# Patient Record
Sex: FEMALE | Race: WHITE | Employment: PART TIME | URBAN - NONMETROPOLITAN AREA
[De-identification: names, ages, dates, MRNs, and addresses within clinical notes are randomized per-mention and may not be internally consistent; named-entity substitution may affect disease eponyms.]

---

## 2017-01-12 ENCOUNTER — OFFICE VISIT (OUTPATIENT)
Dept: PRIMARY CARE CLINIC | Age: 57
End: 2017-01-12

## 2017-01-12 VITALS
SYSTOLIC BLOOD PRESSURE: 108 MMHG | HEART RATE: 66 BPM | HEIGHT: 62 IN | WEIGHT: 141.6 LBS | TEMPERATURE: 96.9 F | BODY MASS INDEX: 26.06 KG/M2 | OXYGEN SATURATION: 95 % | DIASTOLIC BLOOD PRESSURE: 64 MMHG

## 2017-01-12 DIAGNOSIS — H61.22 IMPACTED CERUMEN OF LEFT EAR: Primary | ICD-10-CM

## 2017-01-12 DIAGNOSIS — J06.9 UPPER RESPIRATORY TRACT INFECTION, UNSPECIFIED TYPE: ICD-10-CM

## 2017-01-12 PROCEDURE — G8599 NO ASA/ANTIPLAT THER USE RNG: HCPCS | Performed by: NURSE PRACTITIONER

## 2017-01-12 PROCEDURE — 99213 OFFICE O/P EST LOW 20 MIN: CPT | Performed by: NURSE PRACTITIONER

## 2017-01-12 PROCEDURE — G8419 CALC BMI OUT NRM PARAM NOF/U: HCPCS | Performed by: NURSE PRACTITIONER

## 2017-01-12 PROCEDURE — 3014F SCREEN MAMMO DOC REV: CPT | Performed by: NURSE PRACTITIONER

## 2017-01-12 PROCEDURE — G8427 DOCREV CUR MEDS BY ELIG CLIN: HCPCS | Performed by: NURSE PRACTITIONER

## 2017-01-12 PROCEDURE — 3017F COLORECTAL CA SCREEN DOC REV: CPT | Performed by: NURSE PRACTITIONER

## 2017-01-12 PROCEDURE — 4004F PT TOBACCO SCREEN RCVD TLK: CPT | Performed by: NURSE PRACTITIONER

## 2017-01-12 PROCEDURE — G8484 FLU IMMUNIZE NO ADMIN: HCPCS | Performed by: NURSE PRACTITIONER

## 2017-01-12 RX ORDER — DEXTROMETHORPHAN HYDROBROMIDE AND PROMETHAZINE HYDROCHLORIDE 15; 6.25 MG/5ML; MG/5ML
5 SYRUP ORAL 4 TIMES DAILY PRN
Qty: 200 ML | Refills: 0 | Status: SHIPPED | OUTPATIENT
Start: 2017-01-12 | End: 2017-11-08 | Stop reason: ALTCHOICE

## 2017-01-12 RX ORDER — BENZONATATE 200 MG/1
200 CAPSULE ORAL 3 TIMES DAILY PRN
Qty: 30 CAPSULE | Refills: 0 | Status: SHIPPED | OUTPATIENT
Start: 2017-01-12 | End: 2017-11-08 | Stop reason: ALTCHOICE

## 2017-01-12 ASSESSMENT — ENCOUNTER SYMPTOMS
WHEEZING: 1
HEARTBURN: 0
COUGH: 1
HEMOPTYSIS: 0
RHINORRHEA: 1
SORE THROAT: 0
SHORTNESS OF BREATH: 1

## 2017-01-18 ENCOUNTER — OFFICE VISIT (OUTPATIENT)
Dept: FAMILY MEDICINE CLINIC | Age: 57
End: 2017-01-18

## 2017-01-18 VITALS
TEMPERATURE: 98.5 F | DIASTOLIC BLOOD PRESSURE: 78 MMHG | HEIGHT: 65 IN | WEIGHT: 139 LBS | BODY MASS INDEX: 23.16 KG/M2 | HEART RATE: 68 BPM | OXYGEN SATURATION: 99 % | SYSTOLIC BLOOD PRESSURE: 122 MMHG

## 2017-01-18 DIAGNOSIS — I10 ESSENTIAL HYPERTENSION: Primary | ICD-10-CM

## 2017-01-18 DIAGNOSIS — J40 BRONCHITIS: ICD-10-CM

## 2017-01-18 DIAGNOSIS — Z23 NEED FOR PNEUMOCOCCAL VACCINATION: ICD-10-CM

## 2017-01-18 DIAGNOSIS — G47.00 INSOMNIA, UNSPECIFIED TYPE: ICD-10-CM

## 2017-01-18 DIAGNOSIS — M19.90 ARTHRITIS: ICD-10-CM

## 2017-01-18 DIAGNOSIS — E78.00 HYPERCHOLESTEROLEMIA: ICD-10-CM

## 2017-01-18 PROCEDURE — G8599 NO ASA/ANTIPLAT THER USE RNG: HCPCS | Performed by: PHYSICIAN ASSISTANT

## 2017-01-18 PROCEDURE — 4004F PT TOBACCO SCREEN RCVD TLK: CPT | Performed by: PHYSICIAN ASSISTANT

## 2017-01-18 PROCEDURE — 3017F COLORECTAL CA SCREEN DOC REV: CPT | Performed by: PHYSICIAN ASSISTANT

## 2017-01-18 PROCEDURE — G8484 FLU IMMUNIZE NO ADMIN: HCPCS | Performed by: PHYSICIAN ASSISTANT

## 2017-01-18 PROCEDURE — 99214 OFFICE O/P EST MOD 30 MIN: CPT | Performed by: PHYSICIAN ASSISTANT

## 2017-01-18 PROCEDURE — 3014F SCREEN MAMMO DOC REV: CPT | Performed by: PHYSICIAN ASSISTANT

## 2017-01-18 PROCEDURE — G8420 CALC BMI NORM PARAMETERS: HCPCS | Performed by: PHYSICIAN ASSISTANT

## 2017-01-18 PROCEDURE — G8427 DOCREV CUR MEDS BY ELIG CLIN: HCPCS | Performed by: PHYSICIAN ASSISTANT

## 2017-01-18 RX ORDER — ALBUTEROL SULFATE 90 UG/1
2 AEROSOL, METERED RESPIRATORY (INHALATION) EVERY 6 HOURS PRN
COMMUNITY
End: 2018-10-16 | Stop reason: SDUPTHER

## 2017-01-18 RX ORDER — MELOXICAM 15 MG/1
15 TABLET ORAL DAILY
Qty: 30 TABLET | Refills: 6 | Status: SHIPPED | OUTPATIENT
Start: 2017-01-18 | End: 2017-08-08 | Stop reason: SDUPTHER

## 2017-01-18 RX ORDER — PREDNISONE 20 MG/1
20 TABLET ORAL 2 TIMES DAILY
Qty: 10 TABLET | Refills: 0 | Status: SHIPPED | OUTPATIENT
Start: 2017-01-18 | End: 2017-01-23

## 2017-01-18 RX ORDER — TRAZODONE HYDROCHLORIDE 50 MG/1
50 TABLET ORAL NIGHTLY
Qty: 30 TABLET | Refills: 6 | Status: SHIPPED | OUTPATIENT
Start: 2017-01-18 | End: 2017-08-08 | Stop reason: SDUPTHER

## 2017-01-18 RX ORDER — AZITHROMYCIN 250 MG/1
TABLET, FILM COATED ORAL
Qty: 1 PACKET | Refills: 0 | Status: SHIPPED | OUTPATIENT
Start: 2017-01-18 | End: 2017-01-28

## 2017-01-18 RX ORDER — LISINOPRIL 10 MG/1
10 TABLET ORAL DAILY
Qty: 30 TABLET | Refills: 6 | Status: SHIPPED | OUTPATIENT
Start: 2017-01-18 | End: 2017-08-08 | Stop reason: SDUPTHER

## 2017-01-18 RX ORDER — ATORVASTATIN CALCIUM 20 MG/1
20 TABLET, FILM COATED ORAL DAILY
Qty: 30 TABLET | Refills: 6 | Status: SHIPPED | OUTPATIENT
Start: 2017-01-18 | End: 2017-08-04 | Stop reason: SDUPTHER

## 2017-01-18 RX ORDER — GUAIFENESIN AND CODEINE PHOSPHATE 100; 10 MG/5ML; MG/5ML
5 SOLUTION ORAL 4 TIMES DAILY PRN
Qty: 150 ML | Refills: 1 | Status: SHIPPED | OUTPATIENT
Start: 2017-01-18 | End: 2017-01-25

## 2017-01-18 ASSESSMENT — ENCOUNTER SYMPTOMS
BLURRED VISION: 0
COUGH: 1
SHORTNESS OF BREATH: 1
WHEEZING: 1

## 2017-01-18 ASSESSMENT — PATIENT HEALTH QUESTIONNAIRE - PHQ9
SUM OF ALL RESPONSES TO PHQ9 QUESTIONS 1 & 2: 0
2. FEELING DOWN, DEPRESSED OR HOPELESS: 0
SUM OF ALL RESPONSES TO PHQ QUESTIONS 1-9: 0
1. LITTLE INTEREST OR PLEASURE IN DOING THINGS: 0

## 2017-07-21 ENCOUNTER — TELEPHONE (OUTPATIENT)
Dept: FAMILY MEDICINE CLINIC | Age: 57
End: 2017-07-21

## 2017-07-21 DIAGNOSIS — Z72.0 TOBACCO ABUSE: ICD-10-CM

## 2017-07-21 DIAGNOSIS — I25.119 CORONARY ARTERY DISEASE WITH ANGINA PECTORIS, UNSPECIFIED VESSEL OR LESION TYPE, UNSPECIFIED WHETHER NATIVE OR TRANSPLANTED HEART (HCC): Primary | ICD-10-CM

## 2017-07-21 DIAGNOSIS — Z86.39 HISTORY OF THYROIDITIS: ICD-10-CM

## 2017-07-21 DIAGNOSIS — E78.00 HYPERCHOLESTEROLEMIA: ICD-10-CM

## 2017-08-04 ENCOUNTER — TELEPHONE (OUTPATIENT)
Dept: FAMILY MEDICINE CLINIC | Age: 57
End: 2017-08-04

## 2017-08-04 DIAGNOSIS — E78.00 HYPERCHOLESTEROLEMIA: ICD-10-CM

## 2017-08-04 RX ORDER — ATORVASTATIN CALCIUM 20 MG/1
20 TABLET, FILM COATED ORAL DAILY
Qty: 30 TABLET | Refills: 1 | Status: SHIPPED | OUTPATIENT
Start: 2017-08-04 | End: 2018-02-07 | Stop reason: SDUPTHER

## 2017-08-08 ENCOUNTER — OFFICE VISIT (OUTPATIENT)
Dept: FAMILY MEDICINE CLINIC | Age: 57
End: 2017-08-08
Payer: COMMERCIAL

## 2017-08-08 VITALS
DIASTOLIC BLOOD PRESSURE: 80 MMHG | HEIGHT: 65 IN | TEMPERATURE: 98 F | BODY MASS INDEX: 23.22 KG/M2 | SYSTOLIC BLOOD PRESSURE: 120 MMHG | HEART RATE: 80 BPM | WEIGHT: 139.4 LBS

## 2017-08-08 DIAGNOSIS — I10 ESSENTIAL HYPERTENSION: ICD-10-CM

## 2017-08-08 DIAGNOSIS — M54.5 LOW BACK PAIN, UNSPECIFIED BACK PAIN LATERALITY, UNSPECIFIED CHRONICITY, WITH SCIATICA PRESENCE UNSPECIFIED: ICD-10-CM

## 2017-08-08 DIAGNOSIS — H61.22 IMPACTED CERUMEN, LEFT EAR: ICD-10-CM

## 2017-08-08 DIAGNOSIS — N30.00 ACUTE CYSTITIS WITHOUT HEMATURIA: ICD-10-CM

## 2017-08-08 DIAGNOSIS — E78.00 HYPERCHOLESTEROLEMIA: ICD-10-CM

## 2017-08-08 DIAGNOSIS — Z72.0 TOBACCO ABUSE: ICD-10-CM

## 2017-08-08 DIAGNOSIS — G47.00 INSOMNIA, UNSPECIFIED TYPE: ICD-10-CM

## 2017-08-08 DIAGNOSIS — S99.921A TOE INJURY, RIGHT, INITIAL ENCOUNTER: ICD-10-CM

## 2017-08-08 DIAGNOSIS — R10.30 LOWER ABDOMINAL PAIN: ICD-10-CM

## 2017-08-08 DIAGNOSIS — M19.90 ARTHRITIS: Primary | ICD-10-CM

## 2017-08-08 LAB
-: ABNORMAL
AMORPHOUS: ABNORMAL
BACTERIA: ABNORMAL
BILIRUBIN URINE: NEGATIVE
CASTS UA: ABNORMAL /LPF (ref 0–2)
COLOR: ABNORMAL
COMMENT UA: ABNORMAL
CRYSTALS, UA: ABNORMAL /HPF
EPITHELIAL CELLS UA: ABNORMAL /HPF (ref 0–5)
GLUCOSE URINE: NEGATIVE
KETONES, URINE: NEGATIVE
LEUKOCYTE ESTERASE, URINE: NEGATIVE
MUCUS: ABNORMAL
NITRITE, URINE: POSITIVE
OTHER OBSERVATIONS UA: ABNORMAL
PH UA: 5.5 (ref 5–6)
PROTEIN UA: NEGATIVE
RBC UA: ABNORMAL /HPF (ref 0–4)
RENAL EPITHELIAL, UA: ABNORMAL /HPF
SPECIFIC GRAVITY UA: 1.03 (ref 1.01–1.02)
TRICHOMONAS: ABNORMAL
TURBIDITY: ABNORMAL
URINE HGB: NEGATIVE
UROBILINOGEN, URINE: NORMAL
WBC UA: ABNORMAL /HPF (ref 0–4)
YEAST: ABNORMAL

## 2017-08-08 PROCEDURE — 87086 URINE CULTURE/COLONY COUNT: CPT | Performed by: PHYSICIAN ASSISTANT

## 2017-08-08 PROCEDURE — 69209 REMOVE IMPACTED EAR WAX UNI: CPT | Performed by: PHYSICIAN ASSISTANT

## 2017-08-08 PROCEDURE — 99214 OFFICE O/P EST MOD 30 MIN: CPT | Performed by: PHYSICIAN ASSISTANT

## 2017-08-08 PROCEDURE — 87088 URINE BACTERIA CULTURE: CPT | Performed by: PHYSICIAN ASSISTANT

## 2017-08-08 PROCEDURE — 81001 URINALYSIS AUTO W/SCOPE: CPT | Performed by: PHYSICIAN ASSISTANT

## 2017-08-08 PROCEDURE — 81003 URINALYSIS AUTO W/O SCOPE: CPT | Performed by: PHYSICIAN ASSISTANT

## 2017-08-08 PROCEDURE — 87186 SC STD MICRODIL/AGAR DIL: CPT | Performed by: PHYSICIAN ASSISTANT

## 2017-08-08 RX ORDER — NITROFURANTOIN 25; 75 MG/1; MG/1
100 CAPSULE ORAL 2 TIMES DAILY
Qty: 14 CAPSULE | Refills: 0 | Status: SHIPPED | OUTPATIENT
Start: 2017-08-08 | End: 2017-08-15

## 2017-08-08 RX ORDER — LISINOPRIL 10 MG/1
10 TABLET ORAL DAILY
Qty: 30 TABLET | Refills: 6 | Status: SHIPPED | OUTPATIENT
Start: 2017-08-08 | End: 2018-02-12 | Stop reason: SDUPTHER

## 2017-08-08 RX ORDER — ACETAMINOPHEN AND CODEINE PHOSPHATE 300; 30 MG/1; MG/1
1 TABLET ORAL EVERY 6 HOURS PRN
Qty: 15 TABLET | Refills: 0 | Status: SHIPPED | OUTPATIENT
Start: 2017-08-08 | End: 2018-02-12 | Stop reason: SDUPTHER

## 2017-08-08 RX ORDER — MELOXICAM 15 MG/1
15 TABLET ORAL DAILY
Qty: 30 TABLET | Refills: 6 | Status: SHIPPED | OUTPATIENT
Start: 2017-08-08 | End: 2018-02-12 | Stop reason: SDUPTHER

## 2017-08-08 RX ORDER — CYCLOBENZAPRINE HCL 10 MG
10 TABLET ORAL 3 TIMES DAILY PRN
Qty: 45 TABLET | Refills: 0 | Status: SHIPPED | OUTPATIENT
Start: 2017-08-08 | End: 2018-02-12 | Stop reason: SDUPTHER

## 2017-08-08 RX ORDER — TRAZODONE HYDROCHLORIDE 50 MG/1
50 TABLET ORAL NIGHTLY
Qty: 30 TABLET | Refills: 6 | Status: SHIPPED | OUTPATIENT
Start: 2017-08-08 | End: 2018-02-12 | Stop reason: SDUPTHER

## 2017-08-11 ENCOUNTER — TELEPHONE (OUTPATIENT)
Dept: FAMILY MEDICINE CLINIC | Age: 57
End: 2017-08-11

## 2017-08-11 LAB
CULTURE: ABNORMAL
CULTURE: ABNORMAL
Lab: ABNORMAL
ORGANISM: ABNORMAL
SPECIMEN DESCRIPTION: ABNORMAL
SPECIMEN DESCRIPTION: ABNORMAL
STATUS: ABNORMAL

## 2017-11-08 ENCOUNTER — OFFICE VISIT (OUTPATIENT)
Dept: FAMILY MEDICINE CLINIC | Age: 57
End: 2017-11-08
Payer: COMMERCIAL

## 2017-11-08 ENCOUNTER — HOSPITAL ENCOUNTER (OUTPATIENT)
Age: 57
Setting detail: SPECIMEN
Discharge: HOME | End: 2017-11-08
Payer: COMMERCIAL

## 2017-11-08 VITALS
TEMPERATURE: 98.3 F | SYSTOLIC BLOOD PRESSURE: 138 MMHG | DIASTOLIC BLOOD PRESSURE: 90 MMHG | HEART RATE: 65 BPM | OXYGEN SATURATION: 99 % | BODY MASS INDEX: 24.03 KG/M2 | RESPIRATION RATE: 12 BRPM | WEIGHT: 144.2 LBS | HEIGHT: 65 IN

## 2017-11-08 DIAGNOSIS — R30.0 DYSURIA: ICD-10-CM

## 2017-11-08 DIAGNOSIS — M25.562 LEFT MEDIAL KNEE PAIN: Primary | ICD-10-CM

## 2017-11-08 DIAGNOSIS — N30.00 ACUTE CYSTITIS WITHOUT HEMATURIA: ICD-10-CM

## 2017-11-08 LAB
-: ABNORMAL
AMORPHOUS: ABNORMAL
BACTERIA: ABNORMAL
BILIRUBIN URINE: NEGATIVE
CASTS UA: ABNORMAL /LPF (ref 0–2)
COLOR: NORMAL
COMMENT UA: NORMAL
CRYSTALS, UA: ABNORMAL /HPF
EPITHELIAL CELLS UA: ABNORMAL /HPF (ref 0–5)
GLUCOSE URINE: NEGATIVE
KETONES, URINE: NEGATIVE
LEUKOCYTE ESTERASE, URINE: NEGATIVE
MUCUS: ABNORMAL
NITRITE, URINE: NEGATIVE
OTHER OBSERVATIONS UA: ABNORMAL
PH UA: 5.5 (ref 5–6)
PROTEIN UA: NEGATIVE
RBC UA: ABNORMAL /HPF (ref 0–4)
RENAL EPITHELIAL, UA: ABNORMAL /HPF
SPECIFIC GRAVITY UA: 1.01 (ref 1.01–1.02)
TRICHOMONAS: ABNORMAL
TURBIDITY: NORMAL
URINE HGB: NEGATIVE
UROBILINOGEN, URINE: NORMAL
WBC UA: ABNORMAL /HPF (ref 0–4)
YEAST: ABNORMAL

## 2017-11-08 PROCEDURE — 3017F COLORECTAL CA SCREEN DOC REV: CPT | Performed by: NURSE PRACTITIONER

## 2017-11-08 PROCEDURE — 87086 URINE CULTURE/COLONY COUNT: CPT

## 2017-11-08 PROCEDURE — 99214 OFFICE O/P EST MOD 30 MIN: CPT | Performed by: NURSE PRACTITIONER

## 2017-11-08 PROCEDURE — 87186 SC STD MICRODIL/AGAR DIL: CPT

## 2017-11-08 PROCEDURE — G8427 DOCREV CUR MEDS BY ELIG CLIN: HCPCS | Performed by: NURSE PRACTITIONER

## 2017-11-08 PROCEDURE — G8599 NO ASA/ANTIPLAT THER USE RNG: HCPCS | Performed by: NURSE PRACTITIONER

## 2017-11-08 PROCEDURE — G8484 FLU IMMUNIZE NO ADMIN: HCPCS | Performed by: NURSE PRACTITIONER

## 2017-11-08 PROCEDURE — G8420 CALC BMI NORM PARAMETERS: HCPCS | Performed by: NURSE PRACTITIONER

## 2017-11-08 PROCEDURE — 4004F PT TOBACCO SCREEN RCVD TLK: CPT | Performed by: NURSE PRACTITIONER

## 2017-11-08 PROCEDURE — 81001 URINALYSIS AUTO W/SCOPE: CPT

## 2017-11-08 PROCEDURE — 87077 CULTURE AEROBIC IDENTIFY: CPT

## 2017-11-08 PROCEDURE — 3014F SCREEN MAMMO DOC REV: CPT | Performed by: NURSE PRACTITIONER

## 2017-11-08 RX ORDER — PHENAZOPYRIDINE HYDROCHLORIDE 100 MG/1
100 TABLET, FILM COATED ORAL 3 TIMES DAILY PRN
Qty: 21 TABLET | Refills: 0 | Status: SHIPPED | OUTPATIENT
Start: 2017-11-08 | End: 2017-11-15

## 2017-11-08 RX ORDER — PREDNISONE 50 MG/1
50 TABLET ORAL DAILY
Qty: 5 TABLET | Refills: 0 | Status: SHIPPED | OUTPATIENT
Start: 2017-11-08 | End: 2017-11-13

## 2017-11-08 RX ORDER — CIPROFLOXACIN 500 MG/1
500 TABLET, FILM COATED ORAL 2 TIMES DAILY
Qty: 20 TABLET | Refills: 0 | Status: SHIPPED | OUTPATIENT
Start: 2017-11-08 | End: 2017-11-10 | Stop reason: ALTCHOICE

## 2017-11-08 ASSESSMENT — ENCOUNTER SYMPTOMS
NAUSEA: 0
VOMITING: 0
SINUS PRESSURE: 0
ABDOMINAL PAIN: 0
CHEST TIGHTNESS: 0
EYES NEGATIVE: 1
DIARRHEA: 0
SHORTNESS OF BREATH: 0
TROUBLE SWALLOWING: 0
ALLERGIC/IMMUNOLOGIC NEGATIVE: 1
CONSTIPATION: 0
COUGH: 0

## 2017-11-08 NOTE — PROGRESS NOTES
[facility]    Subjective:      Patient ID: Edilia Dee is a 55 y.o. y.o. female. HPI Patient in for office for multiple concerns. States that she has had increased frequency and flank pain for about a week. Will obtain a urine at this visit. States that she has been having some left knee pain about a week ago. Denies injury. States that it started off with a slight pain and it is getting worse and making it difficult to ambulate. She has been taking IBU and tylenol without relief. States that she has been trying to wear the knee brace and she feels like that makes that pain worse. She at times that it will give out. She states that she still takes the Mobic daily. She is wondering if Tylenol #3 would help her pain. I told her we would assess her knee and decide then. She states that she recently lost her insurance and just got it back. Past Medical History:   Diagnosis Date    COPD (chronic obstructive pulmonary disease) ([facility] [address]     Hemorrhoids     History of herpes zoster 2002    (right lower rib area laterally and anteriorly).  History of thyroiditis 1992    History of ulcerative colitis     (of 20 years disease - though quiescent).  Hypercholesterolemia     Hypertension     Neuritis     Shortened [place] interval     (on EKG from 2004).  Tobacco abuse     Venous insufficiency, peripheral     (bilateral). Past Surgical History:   Procedure Laterality Date    APPENDECTOMY  1982    (Dr. Ryan Pitts) - Acute appendicitis.  CARDIAC CATHETERIZATION  2000    Preserved left ventricular systolic function. Ejection fraction 50%. Noncritical coronary disease. (Dr. Suzan Zelaya).  CHOLECYSTECTOMY  2001    COLONOSCOPY  1988    (Dr. Lakia Whalen) - Normal colon.  COLONOSCOPY  5/12/16    Polyp distal sigmoid, rectum - Dr Breonna Cordova  2010    Incision and drainage of thrombosed hemorrhoid.  HYSTERECTOMY  2001    (no BSO).     OTHER SURGICAL HISTORY      Ear surgery x8.    OTHER SURGICAL HISTORY  1987    Pelvic exam under anesthesia. (Dr. Flora Hermosillo) - Chronic pelvic pain, chronic inflammatory process changes.  TUBAL LIGATION  1982       Family History   Problem Relation Age of Onset    Diabetes Mother     Other Mother      (history of polyps).  Arthritis Mother     Cancer Mother      breast    COPD Father     Other Father      ('s disease)    Mental Illness Daughter      bipolar; schizophrenia       No Known Allergies    Current Outpatient Prescriptions   Medication Sig Dispense Refill    predniSONE (DELTASONE) 50 MG tablet Take 1 tablet by mouth daily for 5 days 5 tablet 0    ciprofloxacin (CIPRO) 500 MG tablet Take 1 tablet by mouth 2 times daily 20 tablet 0    phenazopyridine (PYRIDIUM) 100 MG tablet Take 1 tablet by mouth 3 times daily as needed for Pain 21 tablet 0    meloxicam (MOBIC) 15 MG tablet Take 1 tablet by mouth daily 30 tablet 6    lisinopril (PRINIVIL;ZESTRIL) 10 MG tablet Take 1 tablet by mouth daily 30 tablet 6    atorvastatin (LIPITOR) 20 MG tablet Take 1 tablet by mouth daily 30 tablet 1    albuterol sulfate  (90 BASE) MCG/ACT inhaler Inhale 2 puffs into the lungs every 6 hours as needed for Wheezing      Glucosamine-Chondroit-Vit C-Mn (GLUCOSAMINE 1500 COMPLEX PO) Take by mouth daily      CRANBERRY PO Take by mouth daily      Multiple Vitamins-Minerals (HAIR/SKIN/NAILS PO) Take by mouth daily      ascorbic acid (VITAMIN C) 500 MG tablet Take 500 mg by mouth daily.       traZODone (DESYREL) 50 MG tablet Take 1 tablet by mouth nightly 30 tablet 6    cyclobenzaprine (FLEXERIL) 10 MG tablet Take 1 tablet by mouth 3 times daily as needed for Muscle spasms 45 tablet 0    acetaminophen-codeine (TYLENOL #3) 300-30 MG per tablet Take 1 tablet by mouth every 6 hours as needed for Pain 15 tablet 0     Current Facility-Administered Medications   Medication Dose Route Frequency Provider Last Rate Last Dose    albuterol Musculoskeletal: Normal range of motion. Left knee: She exhibits swelling (mild at point of tenderness). She exhibits normal range of motion, no effusion, no deformity, no laceration and no erythema. Tenderness found. Medial joint line tenderness noted. Legs:  Neurological: She is alert and oriented to person, place, and time. Skin: Skin is warm and dry. Psychiatric: She has a normal mood and affect. Her behavior is normal. Judgment and thought content normal.     Hospital Outpatient Visit on 11/08/2017   Component Date Value Ref Range Status    Color, UA 11/08/2017 NOT REPORTED  YEL Final    Turbidity UA 11/08/2017 NOT REPORTED  CLEAR Final    Glucose, Ur 11/08/2017 NEGATIVE  NEG Final    Bilirubin Urine 11/08/2017 NEGATIVE  NEG Final    Ketones, Urine 11/08/2017 NEGATIVE  NEG Final    Specific Gravity, UA 11/08/2017 1.010  1.010 - 1.025 Final    Urine Hgb 11/08/2017 NEGATIVE  NEG Final    pH, UA 11/08/2017 5.5  5.0 - 6.0 Final    Protein, UA 11/08/2017 NEGATIVE  NEG Final    Urobilinogen, Urine 11/08/2017 Normal  NORM Final    Nitrite, Urine 11/08/2017 NEGATIVE  NEG Final    Leukocyte Esterase, Urine 11/08/2017 NEGATIVE  NEG Final    Comment: Performed at [facility] [address] [id number] [phone]      Urinalysis Comments 11/08/2017 NOT REPORTED   Final    - 11/08/2017        Final    WBC, UA 11/08/2017 0 TO 4  0 - 4 /HPF Final    RBC, UA 11/08/2017 0 TO 4  0 - 4 /HPF Final    Casts UA 11/08/2017 NOT REPORTED  0 - 2 /LPF Final    Crystals UA 11/08/2017 NOT REPORTED  NONE /HPF Final    Epithelial Cells UA 11/08/2017 0 TO 4  0 - 5 /HPF Final    Renal Epithelial, Urine 11/08/2017 NOT REPORTED  0 /HPF Final    Bacteria, UA 11/08/2017 3+* NONE Final    Comment: Performed at [facility] [address] [address] [phone]      Mucus, UA 11/08/2017 NOT REPORTED

## 2017-11-08 NOTE — PATIENT INSTRUCTIONS
Patient Education        Learning About Benefits From Quitting Smoking  How does quitting smoking make you healthier? If you're thinking about quitting smoking, you may have a few reasons to be smoke-free. Your health may be one of them. · When you quit smoking, you lower your risks for cancer, lung disease, heart attack, stroke, blood vessel disease, and blindness from macular degeneration. · When you're smoke-free, you get sick less often, and you heal faster. You are less likely to get colds, flu, bronchitis, and pneumonia. · As a nonsmoker, you may find that your mood is better and you are less stressed. When and how will you feel healthier? Quitting has real health benefits that start from day 1 of being smoke-free. And the longer you stay smoke-free, the healthier you get and the better you feel. The first hours  · After just 20 minutes, your blood pressure and heart rate go down. That means there's less stress on your heart and blood vessels. · Within 12 hours, the level of carbon monoxide in your blood drops back to normal. That makes room for more oxygen. With more oxygen in your body, you may notice that you have more energy than when you smoked. After 2 weeks  · Your lungs start to work better. · Your risk of heart attack starts to drop. After 1 month  · When your lungs are clear, you cough less and breathe deeper, so it's easier to be active. · Your sense of taste and smell return. That means you can enjoy food more than you have since you started smoking. Over the years  · After 1 year, your risk of heart disease is half what it would be if you kept smoking. · After 5 years, your risk of stroke starts to shrink. Within a few years after that, it's about the same as if you'd never smoked. · After 10 years, your risk of dying from lung cancer is cut by about half. And your risk for many other types of cancer is lower too. How would quitting help others in your life?   When you quit smoking, you improve the health of everyone who now breathes in your smoke. · Their heart, lung, and cancer risks drop, much like yours. · They are sick less. For babies and small children, living smoke-free means they're less likely to have ear infections, pneumonia, and bronchitis. · If you're a woman who is or will be pregnant someday, quitting smoking means a healthier . · Children who are close to you are less likely to become adult smokers. Where can you learn more? Go to https://[employer]. org and sign in to your [employer] account. Enter [phone] 72 11 in the Epyon box to learn more about \"Learning About Benefits From Quitting Smoking. \"     If you do not have an account, please click on the \"Sign Up Now\" link. Current as of: 2017  Content Version: 11.3  © 6671-1901 [employer]. Care instructions adapted under license by [facility] ([facility]). If you have questions about a medical condition or this instruction, always ask your healthcare professional. [place] [address] any warranty or liability for your use of this information. Patient Education        Knee: Exercises  Your Care Instructions  Here are some examples of exercises for your knee. Start each exercise slowly. Ease off the exercise if you start to have pain. Your doctor or physical therapist will tell you when you can start these exercises and which ones will work best for you. How to do the exercises  Quad sets    1. Sit with your leg straight and supported on the floor or a firm bed. (If you feel discomfort in the front or back of your knee, place a small towel roll under your knee.)  2. Tighten the muscles on top of your thigh by pressing the back of your knee flat down to the floor. (If you feel discomfort under your kneecap, place a small towel roll under your knee.)  3. Hold for about 6 seconds, then rest for up to 10 seconds.   4. Do 8 to 12 repetitions several times a day.  Straight-leg raises to the front    1. Lie on your back with your good knee bent so that your foot rests flat on the floor. Your injured leg should be straight. Make sure that your low back has a normal curve. You should be able to slip your flat hand in between the floor and the small of your back, with your palm touching the floor and your back touching the back of your hand. 2. Tighten the thigh muscles in the injured leg by pressing the back of your knee flat down to the floor. Hold your knee straight. 3. Keeping the thigh muscles tight, lift your injured leg up so that your heel is about 12 inches off the floor. Hold for about 6 seconds and then lower slowly. 4. Do 8 to 12 repetitions, 3 times a day. Straight-leg raises to the outside    1. Lie on your side, with your injured leg on top. 2. Tighten the front thigh muscles of your injured leg to keep your knee straight. 3. Keep your hip and your leg straight in line with the rest of your body, and keep your knee pointing forward. Do not drop your hip back. 4. Lift your injured leg straight up toward the ceiling, about 12 inches off the floor. Hold for about 6 seconds, then slowly lower your leg. 5. Do 8 to 12 repetitions. Straight-leg raises to the back    1. Lie on your stomach, and lift your leg straight up behind you (toward the ceiling). 2. Lift your toes about 6 inches off the floor, hold for about 6 seconds, then lower slowly. 3. Do 8 to 12 repetitions. Straight-leg raises to the inside    1. Lie on the side of your body with the injured leg. 2. You can either prop your other (good) leg up on a chair, or you can bend your good knee and put that foot in front of your injured knee. Do not drop your hip back. 3. Tighten the muscles on the front of your thigh to straighten your injured knee. 4. Keep your kneecap pointing forward, and lift your whole leg up toward the ceiling about 6 inches.  Hold for about 6 seconds, then lower slowly. 5. Do 8 to 12 repetitions. Heel dig bridging    1. Lie on your back with both knees bent and your ankles bent so that only your heels are digging into the floor. Your knees should be bent about 90 degrees. 2. Then push your heels into the floor, squeeze your buttocks, and lift your hips off the floor until your shoulders, hips, and knees are all in a straight line. 3. Hold for about 6 seconds as you continue to breathe normally, and then slowly lower your hips back down to the floor and rest for up to 10 seconds. 4. Do 8 to 12 repetitions. Hamstring curls    1. Lie on your stomach with your knees straight. If your kneecap is uncomfortable, roll up a washcloth and put it under your leg just above your kneecap. 2. Lift the foot of your injured leg by bending the knee so that you bring the foot up toward your buttock. If this motion hurts, try it without bending your knee quite as far. This may help you avoid any painful motion. 3. Slowly lower your leg back to the floor. 4. Do 8 to 12 repetitions. 5. With permission from your doctor or physical therapist, you may also want to add a cuff weight to your ankle (not more than 5 pounds). With weight, you do not have to lift your leg more than 12 inches to get a hamstring workout. Shallow standing knee bends    Note: Do this exercise only if you have very little pain; if you have no clicking, locking, or giving way if you have an injured knee; and if it does not hurt while you are doing 8 to 12 repetitions. 1. Stand with your hands lightly resting on a counter or chair in front of you. Put your feet shoulder-width apart. 2. Slowly bend your knees so that you squat down like you are going to sit in a chair. Make sure your knees do not go in front of your toes. 3. Lower yourself about 6 inches. Your heels should remain on the floor at all times. 4. Rise slowly to a standing position. Heel raises    1.  Stand with your feet a few inches apart, with your

## 2017-11-10 DIAGNOSIS — N30.01 ACUTE CYSTITIS WITH HEMATURIA: Primary | ICD-10-CM

## 2017-11-10 RX ORDER — SULFAMETHOXAZOLE AND TRIMETHOPRIM 800; 160 MG/1; MG/1
1 TABLET ORAL 2 TIMES DAILY
Qty: 20 TABLET | Refills: 0 | Status: SHIPPED | OUTPATIENT
Start: 2017-11-10 | End: 2017-11-20

## 2018-02-06 ENCOUNTER — HOSPITAL ENCOUNTER (OUTPATIENT)
Dept: LAB | Age: 58
Setting detail: SPECIMEN
Discharge: HOME | End: 2018-02-06
Payer: COMMERCIAL

## 2018-02-06 DIAGNOSIS — I10 ESSENTIAL HYPERTENSION: ICD-10-CM

## 2018-02-06 DIAGNOSIS — Z72.0 TOBACCO ABUSE: ICD-10-CM

## 2018-02-06 DIAGNOSIS — E78.00 HYPERCHOLESTEROLEMIA: ICD-10-CM

## 2018-02-06 LAB
ALBUMIN SERPL-MCNC: 4.1 G/DL (ref 3.5–5.2)
ALBUMIN/GLOBULIN RATIO: 1.6 (ref 1–2.5)
ALP BLD-CCNC: 65 U/L (ref 35–104)
ALT SERPL-CCNC: 15 U/L (ref 5–33)
ANION GAP SERPL CALCULATED.3IONS-SCNC: 13 MMOL/L (ref 9–17)
AST SERPL-CCNC: 17 U/L
BILIRUB SERPL-MCNC: 0.31 MG/DL (ref 0.3–1.2)
BUN BLDV-MCNC: 22 MG/DL (ref 6–20)
BUN/CREAT BLD: 27 (ref 9–20)
CALCIUM SERPL-MCNC: 9.5 MG/DL (ref 8.6–10.4)
CHLORIDE BLD-SCNC: 103 MMOL/L (ref 98–107)
CHOLESTEROL/HDL RATIO: 4.6
CHOLESTEROL: 237 MG/DL
CO2: 25 MMOL/L (ref 20–31)
CREAT SERPL-MCNC: 0.81 MG/DL (ref 0.5–0.9)
GFR AFRICAN AMERICAN: >60 ML/MIN
GFR NON-AFRICAN AMERICAN: >60 ML/MIN
GFR SERPL CREATININE-BSD FRML MDRD: ABNORMAL ML/MIN/{1.73_M2}
GFR SERPL CREATININE-BSD FRML MDRD: ABNORMAL ML/MIN/{1.73_M2}
GLUCOSE BLD-MCNC: 102 MG/DL (ref 70–99)
HDLC SERPL-MCNC: 52 MG/DL
LDL CHOLESTEROL: 163 MG/DL (ref 0–130)
POTASSIUM SERPL-SCNC: 4.4 MMOL/L (ref 3.7–5.3)
SODIUM BLD-SCNC: 141 MMOL/L (ref 135–144)
TOTAL PROTEIN: 6.6 G/DL (ref 6.4–8.3)
TRIGL SERPL-MCNC: 109 MG/DL
VLDLC SERPL CALC-MCNC: ABNORMAL MG/DL (ref 1–30)

## 2018-02-06 PROCEDURE — 36415 COLL VENOUS BLD VENIPUNCTURE: CPT

## 2018-02-06 PROCEDURE — 80053 COMPREHEN METABOLIC PANEL: CPT

## 2018-02-06 PROCEDURE — 80061 LIPID PANEL: CPT

## 2018-02-07 ENCOUNTER — TELEPHONE (OUTPATIENT)
Dept: FAMILY MEDICINE CLINIC | Age: 58
End: 2018-02-07

## 2018-02-07 DIAGNOSIS — E78.00 HYPERCHOLESTEROLEMIA: ICD-10-CM

## 2018-02-07 RX ORDER — ATORVASTATIN CALCIUM 20 MG/1
20 TABLET, FILM COATED ORAL DAILY
Qty: 30 TABLET | Refills: 0 | Status: SHIPPED | OUTPATIENT
Start: 2018-02-07 | End: 2018-02-12 | Stop reason: SDUPTHER

## 2018-02-12 ENCOUNTER — HOSPITAL ENCOUNTER (OUTPATIENT)
Dept: LAB | Age: 58
Setting detail: SPECIMEN
Discharge: HOME | End: 2018-02-12

## 2018-02-12 ENCOUNTER — OFFICE VISIT (OUTPATIENT)
Dept: FAMILY MEDICINE CLINIC | Age: 58
End: 2018-02-12

## 2018-02-12 VITALS
BODY MASS INDEX: 24.19 KG/M2 | SYSTOLIC BLOOD PRESSURE: 120 MMHG | RESPIRATION RATE: 18 BRPM | WEIGHT: 145.2 LBS | TEMPERATURE: 97.4 F | OXYGEN SATURATION: 97 % | HEIGHT: 65 IN | DIASTOLIC BLOOD PRESSURE: 70 MMHG | HEART RATE: 82 BPM

## 2018-02-12 DIAGNOSIS — Z87.898: ICD-10-CM

## 2018-02-12 DIAGNOSIS — H60.501 ACUTE OTITIS EXTERNA OF RIGHT EAR, UNSPECIFIED TYPE: ICD-10-CM

## 2018-02-12 DIAGNOSIS — I10 ESSENTIAL HYPERTENSION: ICD-10-CM

## 2018-02-12 DIAGNOSIS — E78.00 HYPERCHOLESTEROLEMIA: Primary | ICD-10-CM

## 2018-02-12 DIAGNOSIS — M25.561 ACUTE PAIN OF RIGHT KNEE: ICD-10-CM

## 2018-02-12 DIAGNOSIS — M19.90 ARTHRITIS: ICD-10-CM

## 2018-02-12 DIAGNOSIS — G47.00 INSOMNIA, UNSPECIFIED TYPE: ICD-10-CM

## 2018-02-12 LAB
-: ABNORMAL
AMORPHOUS: ABNORMAL
BACTERIA: ABNORMAL
BILIRUBIN URINE: NEGATIVE
CASTS UA: ABNORMAL /LPF (ref 0–2)
COLOR: ABNORMAL
COMMENT UA: ABNORMAL
CRYSTALS, UA: ABNORMAL /HPF
EPITHELIAL CELLS UA: ABNORMAL /HPF (ref 0–5)
GLUCOSE URINE: NEGATIVE
KETONES, URINE: NEGATIVE
LEUKOCYTE ESTERASE, URINE: NEGATIVE
MUCUS: ABNORMAL
NITRITE, URINE: NEGATIVE
OTHER OBSERVATIONS UA: ABNORMAL
PH UA: 5.5 (ref 5–6)
PROTEIN UA: NEGATIVE
RBC UA: ABNORMAL /HPF (ref 0–4)
RENAL EPITHELIAL, UA: ABNORMAL /HPF
SPECIFIC GRAVITY UA: 1.02 (ref 1.01–1.02)
TRICHOMONAS: ABNORMAL
TURBIDITY: ABNORMAL
URINE HGB: NEGATIVE
UROBILINOGEN, URINE: NORMAL
WBC UA: ABNORMAL /HPF (ref 0–4)
YEAST: ABNORMAL

## 2018-02-12 PROCEDURE — 81001 URINALYSIS AUTO W/SCOPE: CPT

## 2018-02-12 PROCEDURE — 99214 OFFICE O/P EST MOD 30 MIN: CPT | Performed by: PHYSICIAN ASSISTANT

## 2018-02-12 RX ORDER — LISINOPRIL 10 MG/1
10 TABLET ORAL DAILY
Qty: 30 TABLET | Refills: 6 | Status: SHIPPED | OUTPATIENT
Start: 2018-02-12 | End: 2018-10-05 | Stop reason: SDUPTHER

## 2018-02-12 RX ORDER — TRAZODONE HYDROCHLORIDE 50 MG/1
50 TABLET ORAL NIGHTLY
Qty: 30 TABLET | Refills: 6 | Status: SHIPPED | OUTPATIENT
Start: 2018-02-12 | End: 2018-10-16 | Stop reason: SDUPTHER

## 2018-02-12 RX ORDER — CYCLOBENZAPRINE HCL 10 MG
10 TABLET ORAL 3 TIMES DAILY PRN
Qty: 45 TABLET | Refills: 0 | Status: SHIPPED | OUTPATIENT
Start: 2018-02-12 | End: 2018-10-16 | Stop reason: SDUPTHER

## 2018-02-12 RX ORDER — ACETAMINOPHEN AND CODEINE PHOSPHATE 300; 30 MG/1; MG/1
1 TABLET ORAL EVERY 6 HOURS PRN
Qty: 15 TABLET | Refills: 0 | Status: SHIPPED | OUTPATIENT
Start: 2018-02-12 | End: 2018-02-19

## 2018-02-12 RX ORDER — ATORVASTATIN CALCIUM 20 MG/1
20 TABLET, FILM COATED ORAL DAILY
Qty: 30 TABLET | Refills: 6 | Status: SHIPPED | OUTPATIENT
Start: 2018-02-12 | End: 2018-10-16 | Stop reason: SDUPTHER

## 2018-02-12 RX ORDER — MELOXICAM 15 MG/1
15 TABLET ORAL DAILY
Qty: 30 TABLET | Refills: 6 | Status: SHIPPED | OUTPATIENT
Start: 2018-02-12 | End: 2018-10-05 | Stop reason: SDUPTHER

## 2018-02-12 ASSESSMENT — PATIENT HEALTH QUESTIONNAIRE - PHQ9
SUM OF ALL RESPONSES TO PHQ QUESTIONS 1-9: 0
1. LITTLE INTEREST OR PLEASURE IN DOING THINGS: 0
2. FEELING DOWN, DEPRESSED OR HOPELESS: 0
SUM OF ALL RESPONSES TO PHQ9 QUESTIONS 1 & 2: 0

## 2018-02-12 NOTE — PROGRESS NOTES
and anteriorly).  History of thyroiditis 1992    History of ulcerative colitis     (of 20 years disease - though quiescent).  Hypercholesterolemia     Hypertension     Neuritis     Shortened [place] interval     (on EKG from 2004).  Tobacco abuse     Venous insufficiency, peripheral     (bilateral). Past Surgical History:   Procedure Laterality Date    APPENDECTOMY  1982    (Dr. Lexy Koroma) - Acute appendicitis.  CARDIAC CATHETERIZATION  2000    Preserved left ventricular systolic function. Ejection fraction 50%. Noncritical coronary disease. (Dr. Yaw Bernabe).  CHOLECYSTECTOMY  2001    COLONOSCOPY  1988    (Dr. Ernesto Weber) - Normal colon.  COLONOSCOPY  5/12/16    Polyp distal sigmoid, rectum - Dr Rayna Leavitt  2010    Incision and drainage of thrombosed hemorrhoid.  HYSTERECTOMY  2001    (no BSO).  OTHER SURGICAL HISTORY      Ear surgery x8.    OTHER SURGICAL HISTORY  1987    Pelvic exam under anesthesia. (Dr. Mario Mcgarry) - Chronic pelvic pain, chronic inflammatory process changes.  TUBAL LIGATION  1982       Social History   Substance Use Topics    Smoking status: Current Every Day Smoker     Packs/day: 0.50     Years: 15.00    Smokeless tobacco: Never Used    Alcohol use 0.0 oz/week      Comment: occasional       Objective:   Physical Exam   Constitutional: She is oriented to person, place, and time. She appears well-developed. HENT:   Head: Normocephalic. Right Ear: There is swelling. Left Ear: External ear normal.   Eyes: Pupils are equal, round, and reactive to light. Neck: Neck supple. Cardiovascular: Normal rate and regular rhythm. Pulmonary/Chest: Effort normal and breath sounds normal.   Abdominal: Soft. Bowel sounds are normal.   Musculoskeletal:        Right knee: She exhibits decreased range of motion. She exhibits no swelling. Tenderness found. Neurological: She is alert and oriented to person, place, and time. Skin: Skin is warm and dry. calculated using average adult body mass. Additional eGFR calculator   available at:        [email or web address]        Performed at [facility] [address] [facility] [address] [place] [phone] [facility] GFR Staging 02/06/2018 NOT REPORTED   Final       Assessment:      1. Hypercholesterolemia    2. Arthritis    3. Insomnia, unspecified type    4. Essential hypertension    5. Acute pain of right knee    6. Acute otitis externa of right ear, unspecified type    7. History of painful urination          Plan:      Laboratory studies reviewed with patient. Medication compliance discussed in detail. Medications refilled for patient today. Tobacco cessation advised. Tylenol #3 PRN severe pain for right knee. Cortisporin otic drops for right ear. Check follow-up urinalysis. Repeat laboratory studies in six months. Follow-up in six months/sooner PRN.

## 2018-08-20 ENCOUNTER — TELEPHONE (OUTPATIENT)
Dept: PRIMARY CARE CLINIC | Age: 58
End: 2018-08-20

## 2018-09-28 ENCOUNTER — TELEPHONE (OUTPATIENT)
Dept: PRIMARY CARE CLINIC | Age: 58
End: 2018-09-28

## 2018-10-05 DIAGNOSIS — I10 ESSENTIAL HYPERTENSION: ICD-10-CM

## 2018-10-05 DIAGNOSIS — M19.90 ARTHRITIS: ICD-10-CM

## 2018-10-05 RX ORDER — MELOXICAM 15 MG/1
15 TABLET ORAL DAILY
Qty: 30 TABLET | Refills: 0 | Status: SHIPPED | OUTPATIENT
Start: 2018-10-05 | End: 2018-10-16 | Stop reason: SDUPTHER

## 2018-10-05 RX ORDER — LISINOPRIL 10 MG/1
10 TABLET ORAL DAILY
Qty: 30 TABLET | Refills: 0 | Status: SHIPPED | OUTPATIENT
Start: 2018-10-05 | End: 2018-10-16 | Stop reason: SDUPTHER

## 2018-10-05 NOTE — TELEPHONE ENCOUNTER
Last Appt:  2/12/18  Next Appt:   Visit date not found  Med verified in [facility] left to schedule appt

## 2018-10-16 ENCOUNTER — OFFICE VISIT (OUTPATIENT)
Dept: FAMILY MEDICINE CLINIC | Age: 58
End: 2018-10-16
Payer: MEDICAID

## 2018-10-16 VITALS
HEIGHT: 65 IN | DIASTOLIC BLOOD PRESSURE: 78 MMHG | HEART RATE: 76 BPM | BODY MASS INDEX: 23.66 KG/M2 | SYSTOLIC BLOOD PRESSURE: 132 MMHG | WEIGHT: 142 LBS | OXYGEN SATURATION: 94 %

## 2018-10-16 DIAGNOSIS — M19.90 ARTHRITIS: ICD-10-CM

## 2018-10-16 DIAGNOSIS — G47.00 INSOMNIA, UNSPECIFIED TYPE: ICD-10-CM

## 2018-10-16 DIAGNOSIS — Z72.0 TOBACCO ABUSE: ICD-10-CM

## 2018-10-16 DIAGNOSIS — E78.00 HYPERCHOLESTEROLEMIA: ICD-10-CM

## 2018-10-16 DIAGNOSIS — I10 ESSENTIAL HYPERTENSION: Primary | ICD-10-CM

## 2018-10-16 PROCEDURE — 99214 OFFICE O/P EST MOD 30 MIN: CPT | Performed by: PHYSICIAN ASSISTANT

## 2018-10-16 RX ORDER — CYCLOBENZAPRINE HCL 10 MG
10 TABLET ORAL 3 TIMES DAILY PRN
Qty: 45 TABLET | Refills: 0 | Status: SHIPPED | OUTPATIENT
Start: 2018-10-16 | End: 2018-12-28 | Stop reason: SDUPTHER

## 2018-10-16 RX ORDER — ATORVASTATIN CALCIUM 20 MG/1
20 TABLET, FILM COATED ORAL DAILY
Qty: 30 TABLET | Refills: 6 | Status: SHIPPED | OUTPATIENT
Start: 2018-10-16 | End: 2019-04-26 | Stop reason: SDUPTHER

## 2018-10-16 RX ORDER — TRAZODONE HYDROCHLORIDE 50 MG/1
50 TABLET ORAL NIGHTLY
Qty: 30 TABLET | Refills: 6 | Status: ON HOLD | OUTPATIENT
Start: 2018-10-16 | End: 2019-08-05 | Stop reason: HOSPADM

## 2018-10-16 RX ORDER — LISINOPRIL 10 MG/1
10 TABLET ORAL DAILY
Qty: 30 TABLET | Refills: 6 | Status: SHIPPED | OUTPATIENT
Start: 2018-10-16 | End: 2019-04-26 | Stop reason: SDUPTHER

## 2018-10-16 RX ORDER — ALBUTEROL SULFATE 90 UG/1
2 AEROSOL, METERED RESPIRATORY (INHALATION) EVERY 6 HOURS PRN
Qty: 1 INHALER | Refills: 11 | Status: SHIPPED | OUTPATIENT
Start: 2018-10-16 | End: 2020-04-03 | Stop reason: SDUPTHER

## 2018-10-16 RX ORDER — MELOXICAM 15 MG/1
15 TABLET ORAL DAILY
Qty: 30 TABLET | Refills: 6 | Status: SHIPPED | OUTPATIENT
Start: 2018-10-16 | End: 2019-04-26 | Stop reason: SDUPTHER

## 2018-10-16 ASSESSMENT — PATIENT HEALTH QUESTIONNAIRE - PHQ9
SUM OF ALL RESPONSES TO PHQ QUESTIONS 1-9: 0
SUM OF ALL RESPONSES TO PHQ QUESTIONS 1-9: 0
1. LITTLE INTEREST OR PLEASURE IN DOING THINGS: 0

## 2018-10-21 ASSESSMENT — ENCOUNTER SYMPTOMS
SHORTNESS OF BREATH: 0
BLURRED VISION: 0

## 2018-10-21 NOTE — PROGRESS NOTES
Past Surgical History:   Procedure Laterality Date    APPENDECTOMY  1982    (Dr. Everette Cortes) - Acute appendicitis.  CARDIAC CATHETERIZATION  2000    Preserved left ventricular systolic function. Ejection fraction 50%. Noncritical coronary disease. (Dr. Saleem Mai).  CHOLECYSTECTOMY  2001    COLONOSCOPY  1988    (Dr. Becca Camp) - Normal colon.  COLONOSCOPY  5/12/16    Polyp distal sigmoid, rectum - Dr Marky Randolph  2010    Incision and drainage of thrombosed hemorrhoid.  HYSTERECTOMY  2001    (no BSO).  OTHER SURGICAL HISTORY      Ear surgery x8.    OTHER SURGICAL HISTORY  1987    Pelvic exam under anesthesia. (Dr. Radha Trimble) - Chronic pelvic pain, chronic inflammatory process changes.  TUBAL LIGATION  1982     Social History   Substance Use Topics    Smoking status: Current Every Day Smoker     Packs/day: 0.50     Years: 15.00    Smokeless tobacco: Never Used    Alcohol use 0.0 oz/week      Comment: occasional       Objective:   Physical Exam   Constitutional: She is oriented to person, place, and time. She appears well-developed. HENT:   Head: Normocephalic. Right Ear: External ear normal.   Left Ear: External ear normal.   Eyes: Pupils are equal, round, and reactive to light. Neck: Neck supple. Cardiovascular: Normal rate and regular rhythm. Pulmonary/Chest: Effort normal and breath sounds normal.   Musculoskeletal:   Crepitus with ROM right knee   Neurological: She is alert and oriented to person, place, and time. Skin: Skin is warm and dry. Psychiatric: She has a normal mood and affect. Assessment:      1. Essential hypertension    2. Arthritis    3. Hypercholesterolemia    4. Insomnia, unspecified type    5. Tobacco abuse            Plan:      Chronic medical conditions stable on present therapy. Continue current medications which were refilled for patient. Tobacco cessation advised.   Discussed laboratory studies and update of mammogram.  Patient is

## 2018-11-06 DIAGNOSIS — Z12.31 SCREENING MAMMOGRAM, ENCOUNTER FOR: Primary | ICD-10-CM

## 2018-11-12 ENCOUNTER — HOSPITAL ENCOUNTER (OUTPATIENT)
Dept: MAMMOGRAPHY | Age: 58
Discharge: HOME | End: 2018-11-14
Payer: MEDICAID

## 2018-11-12 ENCOUNTER — HOSPITAL ENCOUNTER (OUTPATIENT)
Dept: LAB | Age: 58
Discharge: HOME | End: 2018-11-12
Payer: MEDICAID

## 2018-11-12 ENCOUNTER — TELEPHONE (OUTPATIENT)
Dept: FAMILY MEDICINE CLINIC | Age: 58
End: 2018-11-12

## 2018-11-12 DIAGNOSIS — E78.00 HYPERCHOLESTEROLEMIA: ICD-10-CM

## 2018-11-12 DIAGNOSIS — Z12.31 SCREENING MAMMOGRAM, ENCOUNTER FOR: ICD-10-CM

## 2018-11-12 LAB
ALBUMIN SERPL-MCNC: 4.3 G/DL (ref 3.5–5.2)
ALBUMIN/GLOBULIN RATIO: 1.6 (ref 1–2.5)
ALP BLD-CCNC: 80 U/L (ref 35–104)
ALT SERPL-CCNC: 17 U/L (ref 5–33)
ANION GAP SERPL CALCULATED.3IONS-SCNC: 10 MMOL/L (ref 9–17)
AST SERPL-CCNC: 17 U/L
BILIRUB SERPL-MCNC: 0.3 MG/DL (ref 0.3–1.2)
BUN BLDV-MCNC: 21 MG/DL (ref 6–20)
BUN/CREAT BLD: 23 (ref 9–20)
CALCIUM SERPL-MCNC: 9.7 MG/DL (ref 8.6–10.4)
CHLORIDE BLD-SCNC: 104 MMOL/L (ref 98–107)
CHOLESTEROL/HDL RATIO: 3.8
CHOLESTEROL: 190 MG/DL
CO2: 27 MMOL/L (ref 20–31)
CREAT SERPL-MCNC: 0.91 MG/DL (ref 0.5–0.9)
GFR AFRICAN AMERICAN: >60 ML/MIN
GFR NON-AFRICAN AMERICAN: >60 ML/MIN
GFR SERPL CREATININE-BSD FRML MDRD: ABNORMAL ML/MIN/{1.73_M2}
GFR SERPL CREATININE-BSD FRML MDRD: ABNORMAL ML/MIN/{1.73_M2}
GLUCOSE BLD-MCNC: 94 MG/DL (ref 70–99)
HDLC SERPL-MCNC: 50 MG/DL
LDL CHOLESTEROL: 117 MG/DL (ref 0–130)
POTASSIUM SERPL-SCNC: 4.5 MMOL/L (ref 3.7–5.3)
SODIUM BLD-SCNC: 141 MMOL/L (ref 135–144)
TOTAL PROTEIN: 7 G/DL (ref 6.4–8.3)
TRIGL SERPL-MCNC: 117 MG/DL
VLDLC SERPL CALC-MCNC: NORMAL MG/DL (ref 1–30)

## 2018-11-12 PROCEDURE — 80061 LIPID PANEL: CPT

## 2018-11-12 PROCEDURE — 36415 COLL VENOUS BLD VENIPUNCTURE: CPT

## 2018-11-12 PROCEDURE — 77063 BREAST TOMOSYNTHESIS BI: CPT

## 2018-11-12 PROCEDURE — 80053 COMPREHEN METABOLIC PANEL: CPT

## 2018-11-12 NOTE — TELEPHONE ENCOUNTER
----- Message from Juan AAmbrose, [place] sent at 11/12/2018 11:50 AM EST -----  Normal mammogram.  Continue annual mammogram and monthly SBE.

## 2018-12-28 RX ORDER — CYCLOBENZAPRINE HCL 10 MG
TABLET ORAL
Qty: 45 TABLET | Refills: 0 | Status: SHIPPED | OUTPATIENT
Start: 2018-12-28 | End: 2019-03-14 | Stop reason: SDUPTHER

## 2019-01-21 ENCOUNTER — HOSPITAL ENCOUNTER (OUTPATIENT)
Age: 59
Setting detail: SPECIMEN
Discharge: HOME | End: 2019-01-21
Payer: MEDICAID

## 2019-01-21 ENCOUNTER — OFFICE VISIT (OUTPATIENT)
Dept: OBGYN | Age: 59
End: 2019-01-21
Payer: MEDICAID

## 2019-01-21 DIAGNOSIS — Z11.3 SCREEN FOR SEXUALLY TRANSMITTED DISEASES: ICD-10-CM

## 2019-01-21 DIAGNOSIS — Z01.419 WOMEN'S ANNUAL ROUTINE GYNECOLOGICAL EXAMINATION: Primary | ICD-10-CM

## 2019-01-21 DIAGNOSIS — Z12.4 SCREENING FOR CERVICAL CANCER: ICD-10-CM

## 2019-01-21 DIAGNOSIS — N89.8 VAGINAL DISCHARGE: ICD-10-CM

## 2019-01-21 DIAGNOSIS — Z12.31 VISIT FOR SCREENING MAMMOGRAM: ICD-10-CM

## 2019-01-21 LAB
DIRECT EXAM: ABNORMAL
Lab: ABNORMAL
SPECIMEN DESCRIPTION: ABNORMAL
STATUS: ABNORMAL

## 2019-01-21 PROCEDURE — G8484 FLU IMMUNIZE NO ADMIN: HCPCS | Performed by: ADVANCED PRACTICE MIDWIFE

## 2019-01-21 PROCEDURE — 87480 CANDIDA DNA DIR PROBE: CPT

## 2019-01-21 PROCEDURE — 99386 PREV VISIT NEW AGE 40-64: CPT | Performed by: ADVANCED PRACTICE MIDWIFE

## 2019-01-21 PROCEDURE — 87491 CHLMYD TRACH DNA AMP PROBE: CPT

## 2019-01-21 PROCEDURE — G0145 SCR C/V CYTO,THINLAYER,RESCR: HCPCS

## 2019-01-21 PROCEDURE — 87591 N.GONORRHOEAE DNA AMP PROB: CPT

## 2019-01-21 PROCEDURE — 87660 TRICHOMONAS VAGIN DIR PROBE: CPT

## 2019-01-21 PROCEDURE — 87510 GARDNER VAG DNA DIR PROBE: CPT

## 2019-01-21 RX ORDER — FLUCONAZOLE 150 MG/1
TABLET ORAL
Qty: 2 TABLET | Refills: 1 | Status: SHIPPED | OUTPATIENT
Start: 2019-01-21 | End: 2019-03-13 | Stop reason: ALTCHOICE

## 2019-01-21 ASSESSMENT — ENCOUNTER SYMPTOMS
RESPIRATORY NEGATIVE: 1
EYES NEGATIVE: 1
GASTROINTESTINAL NEGATIVE: 1

## 2019-01-22 DIAGNOSIS — N76.0 BV (BACTERIAL VAGINOSIS): Primary | ICD-10-CM

## 2019-01-22 DIAGNOSIS — B96.89 BV (BACTERIAL VAGINOSIS): Primary | ICD-10-CM

## 2019-01-22 LAB
C TRACH DNA GENITAL QL NAA+PROBE: NEGATIVE
COMMENT: NORMAL
N. GONORRHOEAE DNA: NEGATIVE

## 2019-01-22 RX ORDER — METRONIDAZOLE 500 MG/1
500 TABLET ORAL 2 TIMES DAILY WITH MEALS
Qty: 14 TABLET | Refills: 0 | Status: SHIPPED | OUTPATIENT
Start: 2019-01-22 | End: 2019-01-29

## 2019-02-04 ENCOUNTER — OFFICE VISIT (OUTPATIENT)
Dept: OTOLARYNGOLOGY | Age: 59
End: 2019-02-04
Payer: MEDICAID

## 2019-02-04 VITALS
RESPIRATION RATE: 14 BRPM | BODY MASS INDEX: 24.16 KG/M2 | DIASTOLIC BLOOD PRESSURE: 82 MMHG | TEMPERATURE: 97.7 F | SYSTOLIC BLOOD PRESSURE: 132 MMHG | WEIGHT: 145 LBS | HEART RATE: 72 BPM | HEIGHT: 65 IN

## 2019-02-04 DIAGNOSIS — H66.90 CHRONIC OTITIS MEDIA, UNSPECIFIED OTITIS MEDIA TYPE: Primary | ICD-10-CM

## 2019-02-04 LAB — CYTOLOGY REPORT: NORMAL

## 2019-02-04 PROCEDURE — G8599 NO ASA/ANTIPLAT THER USE RNG: HCPCS | Performed by: OTOLARYNGOLOGY

## 2019-02-04 PROCEDURE — 99203 OFFICE O/P NEW LOW 30 MIN: CPT | Performed by: OTOLARYNGOLOGY

## 2019-02-04 PROCEDURE — G8420 CALC BMI NORM PARAMETERS: HCPCS | Performed by: OTOLARYNGOLOGY

## 2019-02-04 PROCEDURE — 3017F COLORECTAL CA SCREEN DOC REV: CPT | Performed by: OTOLARYNGOLOGY

## 2019-02-04 PROCEDURE — G8484 FLU IMMUNIZE NO ADMIN: HCPCS | Performed by: OTOLARYNGOLOGY

## 2019-02-04 PROCEDURE — 4004F PT TOBACCO SCREEN RCVD TLK: CPT | Performed by: OTOLARYNGOLOGY

## 2019-02-04 PROCEDURE — G8427 DOCREV CUR MEDS BY ELIG CLIN: HCPCS | Performed by: OTOLARYNGOLOGY

## 2019-02-12 ENCOUNTER — OFFICE VISIT (OUTPATIENT)
Dept: OTOLARYNGOLOGY | Age: 59
End: 2019-02-12
Payer: MEDICAID

## 2019-02-12 VITALS
SYSTOLIC BLOOD PRESSURE: 128 MMHG | BODY MASS INDEX: 24.16 KG/M2 | TEMPERATURE: 98.9 F | RESPIRATION RATE: 14 BRPM | DIASTOLIC BLOOD PRESSURE: 82 MMHG | HEIGHT: 65 IN | WEIGHT: 145 LBS | HEART RATE: 72 BPM

## 2019-02-12 DIAGNOSIS — H66.90 CHRONIC OTITIS MEDIA, UNSPECIFIED OTITIS MEDIA TYPE: Primary | ICD-10-CM

## 2019-02-12 PROCEDURE — G8420 CALC BMI NORM PARAMETERS: HCPCS | Performed by: OTOLARYNGOLOGY

## 2019-02-12 PROCEDURE — 4004F PT TOBACCO SCREEN RCVD TLK: CPT | Performed by: OTOLARYNGOLOGY

## 2019-02-12 PROCEDURE — 99213 OFFICE O/P EST LOW 20 MIN: CPT | Performed by: OTOLARYNGOLOGY

## 2019-02-12 PROCEDURE — 3017F COLORECTAL CA SCREEN DOC REV: CPT | Performed by: OTOLARYNGOLOGY

## 2019-02-12 PROCEDURE — G8484 FLU IMMUNIZE NO ADMIN: HCPCS | Performed by: OTOLARYNGOLOGY

## 2019-02-12 PROCEDURE — G8427 DOCREV CUR MEDS BY ELIG CLIN: HCPCS | Performed by: OTOLARYNGOLOGY

## 2019-02-12 PROCEDURE — G8599 NO ASA/ANTIPLAT THER USE RNG: HCPCS | Performed by: OTOLARYNGOLOGY

## 2019-02-12 RX ORDER — NEOMYCIN SULFATE, POLYMYXIN B SULFATE AND HYDROCORTISONE 10; 3.5; 1 MG/ML; MG/ML; [USP'U]/ML
SUSPENSION/ DROPS AURICULAR (OTIC)
Qty: 1 BOTTLE | Refills: 1 | Status: SHIPPED | OUTPATIENT
Start: 2019-02-12 | End: 2021-01-26

## 2019-03-13 ENCOUNTER — OFFICE VISIT (OUTPATIENT)
Dept: OTOLARYNGOLOGY | Age: 59
End: 2019-03-13
Payer: MEDICAID

## 2019-03-13 VITALS
DIASTOLIC BLOOD PRESSURE: 72 MMHG | WEIGHT: 145 LBS | SYSTOLIC BLOOD PRESSURE: 128 MMHG | RESPIRATION RATE: 12 BRPM | HEIGHT: 65 IN | HEART RATE: 88 BPM | BODY MASS INDEX: 24.16 KG/M2

## 2019-03-13 DIAGNOSIS — H66.90 CHRONIC OTITIS MEDIA, UNSPECIFIED OTITIS MEDIA TYPE: Primary | ICD-10-CM

## 2019-03-13 PROCEDURE — 3017F COLORECTAL CA SCREEN DOC REV: CPT | Performed by: OTOLARYNGOLOGY

## 2019-03-13 PROCEDURE — G8427 DOCREV CUR MEDS BY ELIG CLIN: HCPCS | Performed by: OTOLARYNGOLOGY

## 2019-03-13 PROCEDURE — G8599 NO ASA/ANTIPLAT THER USE RNG: HCPCS | Performed by: OTOLARYNGOLOGY

## 2019-03-13 PROCEDURE — G8484 FLU IMMUNIZE NO ADMIN: HCPCS | Performed by: OTOLARYNGOLOGY

## 2019-03-13 PROCEDURE — G8420 CALC BMI NORM PARAMETERS: HCPCS | Performed by: OTOLARYNGOLOGY

## 2019-03-13 PROCEDURE — 4004F PT TOBACCO SCREEN RCVD TLK: CPT | Performed by: OTOLARYNGOLOGY

## 2019-03-13 PROCEDURE — 99213 OFFICE O/P EST LOW 20 MIN: CPT | Performed by: OTOLARYNGOLOGY

## 2019-03-14 DIAGNOSIS — M62.838 MUSCLE SPASM: Primary | ICD-10-CM

## 2019-03-14 RX ORDER — CYCLOBENZAPRINE HCL 10 MG
TABLET ORAL
Qty: 45 TABLET | Refills: 0 | Status: SHIPPED | OUTPATIENT
Start: 2019-03-14 | End: 2019-04-26 | Stop reason: SDUPTHER

## 2019-04-26 ENCOUNTER — OFFICE VISIT (OUTPATIENT)
Dept: FAMILY MEDICINE CLINIC | Age: 59
End: 2019-04-26
Payer: MEDICAID

## 2019-04-26 ENCOUNTER — HOSPITAL ENCOUNTER (OUTPATIENT)
Age: 59
Setting detail: SPECIMEN
Discharge: HOME | End: 2019-04-26
Payer: MEDICAID

## 2019-04-26 VITALS
BODY MASS INDEX: 24.41 KG/M2 | DIASTOLIC BLOOD PRESSURE: 88 MMHG | HEART RATE: 85 BPM | SYSTOLIC BLOOD PRESSURE: 136 MMHG | RESPIRATION RATE: 18 BRPM | HEIGHT: 64 IN | WEIGHT: 143 LBS | OXYGEN SATURATION: 98 %

## 2019-04-26 DIAGNOSIS — I10 ESSENTIAL HYPERTENSION: Primary | ICD-10-CM

## 2019-04-26 DIAGNOSIS — E78.00 HYPERCHOLESTEROLEMIA: ICD-10-CM

## 2019-04-26 DIAGNOSIS — R30.0 DYSURIA: ICD-10-CM

## 2019-04-26 DIAGNOSIS — M62.838 MUSCLE SPASM: ICD-10-CM

## 2019-04-26 DIAGNOSIS — M19.90 ARTHRITIS: ICD-10-CM

## 2019-04-26 LAB
-: ABNORMAL
AMORPHOUS: ABNORMAL
BACTERIA: ABNORMAL
BILIRUBIN URINE: NEGATIVE
CASTS UA: ABNORMAL /LPF (ref 0–2)
COLOR: ABNORMAL
COMMENT UA: ABNORMAL
CRYSTALS, UA: ABNORMAL /HPF
EPITHELIAL CELLS UA: ABNORMAL /HPF (ref 0–5)
GLUCOSE URINE: NEGATIVE
KETONES, URINE: NEGATIVE
LEUKOCYTE ESTERASE, URINE: ABNORMAL
MUCUS: ABNORMAL
NITRITE, URINE: POSITIVE
OTHER OBSERVATIONS UA: ABNORMAL
PH UA: 6 (ref 5–6)
PROTEIN UA: NEGATIVE
RBC UA: ABNORMAL /HPF (ref 0–4)
RENAL EPITHELIAL, UA: ABNORMAL /HPF
SPECIFIC GRAVITY UA: 1.02 (ref 1.01–1.02)
TRICHOMONAS: ABNORMAL
TURBIDITY: ABNORMAL
URINE HGB: ABNORMAL
UROBILINOGEN, URINE: NORMAL
WBC UA: ABNORMAL /HPF (ref 0–4)
YEAST: ABNORMAL

## 2019-04-26 PROCEDURE — 87077 CULTURE AEROBIC IDENTIFY: CPT

## 2019-04-26 PROCEDURE — 87086 URINE CULTURE/COLONY COUNT: CPT

## 2019-04-26 PROCEDURE — 87186 SC STD MICRODIL/AGAR DIL: CPT

## 2019-04-26 PROCEDURE — 81001 URINALYSIS AUTO W/SCOPE: CPT

## 2019-04-26 PROCEDURE — G8420 CALC BMI NORM PARAMETERS: HCPCS | Performed by: PHYSICIAN ASSISTANT

## 2019-04-26 PROCEDURE — 4004F PT TOBACCO SCREEN RCVD TLK: CPT | Performed by: PHYSICIAN ASSISTANT

## 2019-04-26 PROCEDURE — 3017F COLORECTAL CA SCREEN DOC REV: CPT | Performed by: PHYSICIAN ASSISTANT

## 2019-04-26 PROCEDURE — G8427 DOCREV CUR MEDS BY ELIG CLIN: HCPCS | Performed by: PHYSICIAN ASSISTANT

## 2019-04-26 PROCEDURE — 99214 OFFICE O/P EST MOD 30 MIN: CPT | Performed by: PHYSICIAN ASSISTANT

## 2019-04-26 PROCEDURE — G8599 NO ASA/ANTIPLAT THER USE RNG: HCPCS | Performed by: PHYSICIAN ASSISTANT

## 2019-04-26 RX ORDER — LISINOPRIL 10 MG/1
10 TABLET ORAL DAILY
Qty: 30 TABLET | Refills: 6 | Status: SHIPPED | OUTPATIENT
Start: 2019-04-26 | End: 2019-10-28 | Stop reason: SDUPTHER

## 2019-04-26 RX ORDER — NITROFURANTOIN 25; 75 MG/1; MG/1
100 CAPSULE ORAL 2 TIMES DAILY
Qty: 14 CAPSULE | Refills: 0 | Status: SHIPPED | OUTPATIENT
Start: 2019-04-26 | End: 2019-04-29 | Stop reason: CLARIF

## 2019-04-26 RX ORDER — CYCLOBENZAPRINE HCL 10 MG
TABLET ORAL
Qty: 45 TABLET | Refills: 0 | Status: SHIPPED | OUTPATIENT
Start: 2019-04-26 | End: 2019-10-28 | Stop reason: SDUPTHER

## 2019-04-26 RX ORDER — ATORVASTATIN CALCIUM 20 MG/1
20 TABLET, FILM COATED ORAL DAILY
Qty: 30 TABLET | Refills: 6 | Status: SHIPPED | OUTPATIENT
Start: 2019-04-26 | End: 2019-10-28 | Stop reason: SDUPTHER

## 2019-04-26 RX ORDER — MELOXICAM 15 MG/1
15 TABLET ORAL DAILY
Qty: 30 TABLET | Refills: 6 | Status: ON HOLD | OUTPATIENT
Start: 2019-04-26 | End: 2019-08-05 | Stop reason: SDUPTHER

## 2019-04-26 ASSESSMENT — PATIENT HEALTH QUESTIONNAIRE - PHQ9
SUM OF ALL RESPONSES TO PHQ9 QUESTIONS 1 & 2: 0
1. LITTLE INTEREST OR PLEASURE IN DOING THINGS: 0
2. FEELING DOWN, DEPRESSED OR HOPELESS: 0
SUM OF ALL RESPONSES TO PHQ QUESTIONS 1-9: 0
SUM OF ALL RESPONSES TO PHQ QUESTIONS 1-9: 0

## 2019-04-26 ASSESSMENT — ENCOUNTER SYMPTOMS
BACK PAIN: 1
BOWEL INCONTINENCE: 0
RESPIRATORY NEGATIVE: 1
GASTROINTESTINAL NEGATIVE: 1

## 2019-04-26 NOTE — PROGRESS NOTES
Subjective:      Patient ID: Ely Disla is a 63 y.o. female. Hypertension   This is a chronic problem. The current episode started more than 1 year ago. The problem is controlled. Past treatments include ACE inhibitors. The current treatment provides significant improvement. There are no compliance problems. Hyperlipidemia   This is a chronic problem. The current episode started more than 1 year ago. She has no history of hypothyroidism. Pertinent negatives include no leg pain. Current antihyperlipidemic treatment includes statins. The current treatment provides significant improvement of lipids. There are no compliance problems. Dysuria    This is a new problem. The current episode started in the past 7 days. The quality of the pain is described as burning. The patient is experiencing no pain. There has been no fever. She has tried increased fluids for the symptoms. Back Pain   This is a chronic problem. The current episode started more than 1 year ago. The pain is present in the lumbar spine. The quality of the pain is described as aching. Associated symptoms include dysuria. Pertinent negatives include no bladder incontinence, bowel incontinence or leg pain. She has tried NSAIDs for the symptoms. The treatment provided significant relief. Review of Systems   HENT: Negative. Respiratory: Negative. Cardiovascular: Negative. Gastrointestinal: Negative. Negative for bowel incontinence. Genitourinary: Positive for dysuria. Negative for bladder incontinence. Musculoskeletal: Positive for back pain. Past Medical History:   Diagnosis Date    COPD (chronic obstructive pulmonary disease) ([address]     Hemorrhoids     History of herpes zoster 2002    (right lower rib area laterally and anteriorly).  History of thyroiditis 1992    History of ulcerative colitis     (of 20 years disease - though quiescent).     Hypercholesterolemia     Hypertension     Neuritis     Shortened [place] interval     (on EKG from 2004).  Tobacco abuse     Venous insufficiency, peripheral     (bilateral). Past Surgical History:   Procedure Laterality Date    APPENDECTOMY  1982    (Dr. Valle ) - Acute appendicitis.  CARDIAC CATHETERIZATION  2000    Preserved left ventricular systolic function. Ejection fraction 50%. Noncritical coronary disease. (Dr. Gavin Roach).  CHOLECYSTECTOMY  2001    COLONOSCOPY  1988    (Dr. Kishor Pelayo) - Normal colon.  COLONOSCOPY  5/12/16    Polyp distal sigmoid, rectum - Dr Frank Krishnamurthy  2010    Incision and drainage of thrombosed hemorrhoid.  HYSTERECTOMY  2001    (no BSO).  OTHER SURGICAL HISTORY      Ear surgery x8.    OTHER SURGICAL HISTORY  1987    Pelvic exam under anesthesia. (Dr. Gerardo Mantilla) - Chronic pelvic pain, chronic inflammatory process changes.  TUBAL LIGATION  1982     Social History     Tobacco Use    Smoking status: Current Every Day Smoker     Packs/day: 0.50     Years: 15.00     Pack years: 7.50    Smokeless tobacco: Never Used   Substance Use Topics    Alcohol use: Yes     Alcohol/week: 0.0 oz     Comment: occasional    Drug use: No       Objective:   Physical Exam   Constitutional: She is oriented to person, place, and time. She appears well-developed. HENT:   Head: Normocephalic. Right Ear: External ear normal.   Left Ear: External ear normal.   Eyes: Pupils are equal, round, and reactive to light. Neck: Neck supple. Cardiovascular: Normal rate and regular rhythm. Pulmonary/Chest: Effort normal and breath sounds normal.   Abdominal: Soft. Neurological: She is alert and oriented to person, place, and time. Skin: Skin is warm and dry. Psychiatric: She has a normal mood and affect. Assessment:      1. Essential hypertension    2. Hypercholesterolemia    3. Dysuria    4. Arthritis    5. Muscle spasm          Plan:      Update fasting laboratory studies. Chronic medical conditions stable on present therapy.

## 2019-04-28 LAB
CULTURE: ABNORMAL
Lab: ABNORMAL
SPECIMEN DESCRIPTION: ABNORMAL

## 2019-04-29 ENCOUNTER — TELEPHONE (OUTPATIENT)
Dept: FAMILY MEDICINE CLINIC | Age: 59
End: 2019-04-29

## 2019-04-29 DIAGNOSIS — R30.0 DYSURIA: ICD-10-CM

## 2019-04-29 RX ORDER — SULFAMETHOXAZOLE AND TRIMETHOPRIM 800; 160 MG/1; MG/1
1 TABLET ORAL 2 TIMES DAILY
Qty: 14 TABLET | Refills: 0 | OUTPATIENT
Start: 2019-04-29 | End: 2019-05-06

## 2019-04-29 NOTE — TELEPHONE ENCOUNTER
----- Message from Paulette Puentes [place] sent at 4/29/2019 11:36 AM EDT -----  Intermediate to Macrobid. Discontinue Macrobid. Bactrim DS bid x 7 days. Push fluids.

## 2019-05-06 ENCOUNTER — TELEPHONE (OUTPATIENT)
Dept: FAMILY MEDICINE CLINIC | Age: 59
End: 2019-05-06

## 2019-05-15 ENCOUNTER — OFFICE VISIT (OUTPATIENT)
Dept: PRIMARY CARE CLINIC | Age: 59
End: 2019-05-15
Payer: MEDICAID

## 2019-05-15 ENCOUNTER — HOSPITAL ENCOUNTER (OUTPATIENT)
Dept: GENERAL RADIOLOGY | Age: 59
Discharge: HOME | End: 2019-05-17
Payer: MEDICAID

## 2019-05-15 VITALS
HEIGHT: 64 IN | WEIGHT: 138.8 LBS | TEMPERATURE: 97 F | OXYGEN SATURATION: 97 % | DIASTOLIC BLOOD PRESSURE: 80 MMHG | SYSTOLIC BLOOD PRESSURE: 124 MMHG | HEART RATE: 72 BPM | BODY MASS INDEX: 23.7 KG/M2

## 2019-05-15 DIAGNOSIS — M77.31 CALCANEAL SPUR OF FOOT, RIGHT: ICD-10-CM

## 2019-05-15 DIAGNOSIS — M79.671 RIGHT FOOT PAIN: ICD-10-CM

## 2019-05-15 DIAGNOSIS — M79.671 RIGHT FOOT PAIN: Primary | ICD-10-CM

## 2019-05-15 PROCEDURE — G8420 CALC BMI NORM PARAMETERS: HCPCS | Performed by: NURSE PRACTITIONER

## 2019-05-15 PROCEDURE — 3017F COLORECTAL CA SCREEN DOC REV: CPT | Performed by: NURSE PRACTITIONER

## 2019-05-15 PROCEDURE — G8427 DOCREV CUR MEDS BY ELIG CLIN: HCPCS | Performed by: NURSE PRACTITIONER

## 2019-05-15 PROCEDURE — 4004F PT TOBACCO SCREEN RCVD TLK: CPT | Performed by: NURSE PRACTITIONER

## 2019-05-15 PROCEDURE — 73630 X-RAY EXAM OF FOOT: CPT

## 2019-05-15 PROCEDURE — 99214 OFFICE O/P EST MOD 30 MIN: CPT | Performed by: NURSE PRACTITIONER

## 2019-05-15 PROCEDURE — G8599 NO ASA/ANTIPLAT THER USE RNG: HCPCS | Performed by: NURSE PRACTITIONER

## 2019-05-15 RX ORDER — PREDNISONE 20 MG/1
20 TABLET ORAL 2 TIMES DAILY
Qty: 10 TABLET | Refills: 0 | Status: SHIPPED | OUTPATIENT
Start: 2019-05-15 | End: 2019-05-20

## 2019-05-15 ASSESSMENT — ENCOUNTER SYMPTOMS: RESPIRATORY NEGATIVE: 1

## 2019-05-15 NOTE — PATIENT INSTRUCTIONS
Patient Education        Foot Pain: Care Instructions  Your Care Instructions  Foot injuries that cause pain and swelling are fairly common. Almost all sports or home repair projects can cause a misstep that ends up as foot pain. Normal wear and tear, especially as you get older, also can cause foot pain. Most minor foot injuries will heal on their own, and home treatment is usually all you need to do. If you have a severe injury, you may need tests and treatment. Follow-up care is a key part of your treatment and safety. Be sure to make and go to all appointments, and call your doctor if you are having problems. It's also a good idea to know your test results and keep a list of the medicines you take. How can you care for yourself at home? · Take pain medicines exactly as directed. ? If the doctor gave you a prescription medicine for pain, take it as prescribed. ? If you are not taking a prescription pain medicine, ask your doctor if you can take an over-the-counter medicine. · Rest and protect your foot. Take a break from any activity that may cause pain. · Put ice or a cold pack on your foot for 10 to 20 minutes at a time. Put a thin cloth between the ice and your skin. · Prop up the sore foot on a pillow when you ice it or anytime you sit or lie down during the next 3 days. Try to keep it above the level of your heart. This will help reduce swelling. · Your doctor may recommend that you wrap your foot with an elastic bandage. Keep your foot wrapped for as long as your doctor advises. · If your doctor recommends crutches, use them as directed. · Wear roomy footwear. · As soon as pain and swelling end, begin gentle exercises of your foot. Your doctor can tell you which exercises will help. When should you call for help? Call 911 anytime you think you may need emergency care.  For example, call if:    · Your foot turns pale, white, blue, or cold.    Call your doctor now or seek immediate medical care if:    · You cannot move or stand on your foot.     · Your foot looks twisted or out of its normal position.     · Your foot is not stable when you step down.     · You have signs of infection, such as:  ? Increased pain, swelling, warmth, or redness. ? Red streaks leading from the sore area. ? Pus draining from a place on your foot. ? A fever.     · Your foot is numb or tingly.    Watch closely for changes in your health, and be sure to contact your doctor if:    · You do not get better as expected.     · You have bruises from an injury that last longer than 2 weeks. Where can you learn more? Go to https://VeriouspeCounterStorm.[employer]ish[employer]. org and sign in to your [employer] account. Enter G031 in the iGen6 box to learn more about \"Foot Pain: Care Instructions. \"     If you do not have an account, please click on the \"Sign Up Now\" link. Current as of: September 20, 2018  Content Version: 12.0  © 7865-6046 Healthwise, Incorporated. Care instructions adapted under license by [facility] ([facility]). If you have questions about a medical condition or this instruction, always ask your healthcare professional. [place] [address] any warranty or liability for your use of this information.

## 2019-05-15 NOTE — PROGRESS NOTES
[facility]             [address], [address]                        Telephone [phone]             Fax [phone]     Vero Lund  1960  NTM:[id number]   Date of visit:  5/15/2019    Subjective:     Vero Lund is a 63 y.o.  female who presents to [facility] today (5/15/2019) for evaluation of:    Chief Complaint   Patient presents with    Foot Pain    Ankle Pain       Foot Pain   This is a new problem. The current episode started in the past 7 days (X 3 days ago). The problem occurs constantly. The problem has been unchanged. Pertinent negatives include no fever or weakness. Associated symptoms comments: Top of right foot \"burning\"; right Achilles ache with walking or standing . The symptoms are aggravated by walking and standing. She has tried acetaminophen and ice (ace wrap, epsom salt, ibuprofen, elevation) for the symptoms. The treatment provided no relief.        She has the following problem list:  Patient Active Problem List   Diagnosis    CAD (coronary artery disease)    Tobacco abuse    Hypertension    Hypercholesterolemia    History of thyroiditis    Insomnia        Current medications are:  Current Outpatient Medications   Medication Sig Dispense Refill    predniSONE (DELTASONE) 20 MG tablet Take 1 tablet by mouth 2 times daily for 5 days 10 tablet 0    atorvastatin (LIPITOR) 20 MG tablet Take 1 tablet by mouth daily 30 tablet 6    meloxicam (MOBIC) 15 MG tablet Take 1 tablet by mouth daily 30 tablet 6    lisinopril (PRINIVIL;ZESTRIL) 10 MG tablet Take 1 tablet by mouth daily 30 tablet 6    cyclobenzaprine (FLEXERIL) 10 MG tablet take 1 tablet by mouth three times a day if needed for muscle spasm 45 tablet 0    neomycin-polymyxin-hydrocortisone (CORTISPORIN) 3.5-54674-6 otic suspension 3 gtts BID to L ear 1 Bottle 1    traZODone (DESYREL) 50 MG tablet Take 1 tablet by mouth nightly 30 tablet 6    albuterol sulfate  (90 Base) MCG/ACT inhaler Inhale 2 puffs into the lungs every 6 hours as needed for Wheezing 1 Inhaler 11    Glucosamine-Chondroit-Vit C-Mn (GLUCOSAMINE 1500 COMPLEX PO) Take by mouth daily      CRANBERRY PO Take by mouth daily      Multiple Vitamins-Minerals (HAIR/SKIN/NAILS PO) Take by mouth daily      ascorbic acid (VITAMIN C) 500 MG tablet Take 500 mg by mouth daily. Current Facility-Administered Medications   Medication Dose Route Frequency Provider Last Rate Last Dose    albuterol (ACCUNEB) nebulizer solution 0.63 mg  0.63 mg Nebulization Once January Anderson MD            She has No Known Allergies. .    She  reports that she has been smoking. She has a 7.50 pack-year smoking history. She has never used smokeless tobacco.      Objective:    Vitals:    05/15/19 1311   BP: 124/80   Pulse: 72   Temp: 97 °F (36.1 °C)   SpO2: 97%     Body mass index is 23.82 kg/m². Review of Systems   Constitutional: Negative. Negative for fever. Respiratory: Negative. Cardiovascular: Negative. Musculoskeletal:        Top right foot pain and swelling   Neurological: Negative for weakness. Physical Exam   Constitutional: She is oriented to person, place, and time. She appears well-developed and well-nourished. HENT:   Head: Normocephalic. Eyes: Pupils are equal, round, and reactive to light. Neck: Normal range of motion. Neck supple. Cardiovascular: Normal rate, regular rhythm and normal heart sounds. Pulmonary/Chest: Effort normal and breath sounds normal.   Musculoskeletal:        Right foot: There is tenderness, bony tenderness and swelling. There is normal capillary refill and no crepitus. Feet:    Neurological: She is alert and oriented to person, place, and time. Skin: Skin is warm and dry. Psychiatric: She has a normal mood and affect.  Her behavior is normal. Thought content normal.   Nursing note

## 2019-05-22 ENCOUNTER — OFFICE VISIT (OUTPATIENT)
Dept: PODIATRY | Age: 59
End: 2019-05-22
Payer: MEDICAID

## 2019-05-22 VITALS
SYSTOLIC BLOOD PRESSURE: 134 MMHG | HEART RATE: 76 BPM | RESPIRATION RATE: 20 BRPM | DIASTOLIC BLOOD PRESSURE: 80 MMHG | BODY MASS INDEX: 24.28 KG/M2 | HEIGHT: 64 IN | WEIGHT: 142.2 LBS

## 2019-05-22 DIAGNOSIS — M79.671 RIGHT FOOT PAIN: ICD-10-CM

## 2019-05-22 DIAGNOSIS — M84.30XA STRESS REACTION OF BONE: Primary | ICD-10-CM

## 2019-05-22 PROCEDURE — G8599 NO ASA/ANTIPLAT THER USE RNG: HCPCS | Performed by: PODIATRIST

## 2019-05-22 PROCEDURE — G8427 DOCREV CUR MEDS BY ELIG CLIN: HCPCS | Performed by: PODIATRIST

## 2019-05-22 PROCEDURE — 4004F PT TOBACCO SCREEN RCVD TLK: CPT | Performed by: PODIATRIST

## 2019-05-22 PROCEDURE — L3260 AMBULATORY SURGICAL BOOT EAC: HCPCS | Performed by: PODIATRIST

## 2019-05-22 PROCEDURE — 3017F COLORECTAL CA SCREEN DOC REV: CPT | Performed by: PODIATRIST

## 2019-05-22 PROCEDURE — 99203 OFFICE O/P NEW LOW 30 MIN: CPT | Performed by: PODIATRIST

## 2019-05-22 PROCEDURE — G8420 CALC BMI NORM PARAMETERS: HCPCS | Performed by: PODIATRIST

## 2019-05-22 NOTE — PROGRESS NOTES
Subjective:  Prasanna Alston is a 63 y.o. female who presents to the office today complaining of R foot pain. Symptoms began 2 week(s) ago. Patient relates pain is Present. Pain is rated 6 out of 10 and is described as moderate. Treatments prior to today's visit include: xrays. Currently denies F/C/N/V. No Known Allergies    Past Medical History:   Diagnosis Date    COPD (chronic obstructive pulmonary disease) ([address]     Hemorrhoids     History of herpes zoster 2002    (right lower rib area laterally and anteriorly).  History of thyroiditis 1992    History of ulcerative colitis     (of 20 years disease - though quiescent).  Hypercholesterolemia     Hypertension     Neuritis     Shortened [place] interval     (on EKG from 2004).  Tobacco abuse     Venous insufficiency, peripheral     (bilateral). Prior to Admission medications    Medication Sig Start Date End Date Taking?  Authorizing Provider   diclofenac (VOLTAREN) 50 MG EC tablet Take 1 tablet by mouth 3 times daily (with meals) 5/22/19  Yes Ml Mitch, DPM   atorvastatin (LIPITOR) 20 MG tablet Take 1 tablet by mouth daily 4/26/19  Yes RENATO Sutton   meloxicam (MOBIC) 15 MG tablet Take 1 tablet by mouth daily 4/26/19  Yes RENATO Sutton   lisinopril (PRINIVIL;ZESTRIL) 10 MG tablet Take 1 tablet by mouth daily 4/26/19  Yes RENATO Sutton   cyclobenzaprine (FLEXERIL) 10 MG tablet take 1 tablet by mouth three times a day if needed for muscle spasm 4/26/19  Yes Dixon Sutton   neomycin-polymyxin-hydrocortisone (CORTISPORIN) 3.5-77833-9 otic suspension 3 gtts BID to L ear 2/12/19  Yes Dianne Rabago MD   traZODone (DESYREL) 50 MG tablet Take 1 tablet by mouth nightly 10/16/18  Yes RENATO Sutton   albuterol sulfate  (90 Base) MCG/ACT inhaler Inhale 2 puffs into the lungs every 6 hours as needed for Wheezing 10/16/18  Yes RENATO Sutton   Glucosamine-Chondroit-Vit C-Mn (GLUCOSAMINE 1500 COMPLEX PO) Take by mouth daily   Yes Historical Provider, MD   CRANBERRY PO Take by mouth daily   Yes Historical Provider, MD   Multiple Vitamins-Minerals (HAIR/SKIN/NAILS PO) Take by mouth daily   Yes Historical Provider, MD   ascorbic acid (VITAMIN C) 500 MG tablet Take 500 mg by mouth daily. Yes Historical Provider, MD       Past Surgical History:   Procedure Laterality Date    APPENDECTOMY  1982    (Dr. Zander Walker) - Acute appendicitis.  CARDIAC CATHETERIZATION  2000    Preserved left ventricular systolic function. Ejection fraction 50%. Noncritical coronary disease. (Dr. Dilia Mac).  CHOLECYSTECTOMY  2001    COLONOSCOPY  1988    (Dr. Fabián Adams) - Normal colon.  COLONOSCOPY  5/12/16    Polyp distal sigmoid, rectum - Dr Zhao Alejandra  2010    Incision and drainage of thrombosed hemorrhoid.  HYSTERECTOMY  2001    (no BSO).  OTHER SURGICAL HISTORY      Ear surgery x8.    OTHER SURGICAL HISTORY  1987    Pelvic exam under anesthesia. (Dr. Dipesh Chavarria) - Chronic pelvic pain, chronic inflammatory process changes.  TUBAL LIGATION  1982       Family History   Problem Relation Age of Onset    Diabetes Mother     Other Mother         (history of polyps).  Arthritis Mother     Cancer Mother         breast    COPD Father     Other Father         ('s disease)    Mental Illness Daughter         bipolar; schizophrenia       Social History     Tobacco Use    Smoking status: Current Every Day Smoker     Packs/day: 0.50     Years: 15.00     Pack years: 7.50    Smokeless tobacco: Never Used   Substance Use Topics    Alcohol use: Yes     Alcohol/week: 0.0 oz     Comment: occasional       Review of Systems: All 12 systems reviewed and pertinent positives noted above. Lower Extremity Physical Examination:     Vitals:   Vitals:    05/22/19 1004   BP: 134/80   Pulse: 76   Resp: 20     General: AAO x 3 in NAD.      Vascular: DP and PT pulses palpable 2/4, bilateral.  CFT <3 seconds, bilateral.

## 2019-05-28 ENCOUNTER — HOSPITAL ENCOUNTER (OUTPATIENT)
Dept: NUCLEAR MEDICINE | Age: 59
Discharge: HOME | End: 2019-05-30
Payer: MEDICAID

## 2019-05-28 DIAGNOSIS — M79.671 RIGHT FOOT PAIN: ICD-10-CM

## 2019-05-28 DIAGNOSIS — M84.30XA STRESS REACTION OF BONE: ICD-10-CM

## 2019-05-29 ENCOUNTER — HOSPITAL ENCOUNTER (OUTPATIENT)
Dept: NUCLEAR MEDICINE | Age: 59
Discharge: HOME | End: 2019-05-31
Payer: MEDICAID

## 2019-05-29 DIAGNOSIS — M84.30XA STRESS REACTION OF BONE: ICD-10-CM

## 2019-05-29 DIAGNOSIS — M79.671 RIGHT FOOT PAIN: ICD-10-CM

## 2019-05-29 PROCEDURE — 78315 BONE IMAGING 3 PHASE: CPT

## 2019-05-29 PROCEDURE — A9503 TC99M MEDRONATE: HCPCS | Performed by: PODIATRIST

## 2019-05-29 PROCEDURE — 3430000000 HC RX DIAGNOSTIC RADIOPHARMACEUTICAL: Performed by: PODIATRIST

## 2019-05-29 RX ORDER — TC 99M MEDRONATE 20 MG/10ML
25 INJECTION, POWDER, LYOPHILIZED, FOR SOLUTION INTRAVENOUS
Status: COMPLETED | OUTPATIENT
Start: 2019-05-29 | End: 2019-05-29

## 2019-05-29 RX ORDER — TC 99M MEDRONATE 20 MG/10ML
25 INJECTION, POWDER, LYOPHILIZED, FOR SOLUTION INTRAVENOUS
Status: DISCONTINUED | OUTPATIENT
Start: 2019-05-29 | End: 2019-06-01 | Stop reason: HOSPADM

## 2019-05-29 RX ADMIN — Medication 25 MILLICURIE: at 11:56

## 2019-05-30 ENCOUNTER — OFFICE VISIT (OUTPATIENT)
Dept: PODIATRY | Age: 59
End: 2019-05-30
Payer: MEDICAID

## 2019-05-30 VITALS
SYSTOLIC BLOOD PRESSURE: 120 MMHG | RESPIRATION RATE: 20 BRPM | HEIGHT: 64 IN | HEART RATE: 64 BPM | BODY MASS INDEX: 23.93 KG/M2 | WEIGHT: 140.2 LBS | DIASTOLIC BLOOD PRESSURE: 78 MMHG

## 2019-05-30 DIAGNOSIS — M84.374A STRESS FRACTURE OF RIGHT FOOT, INITIAL ENCOUNTER: Primary | ICD-10-CM

## 2019-05-30 PROCEDURE — 28470 CLTX METATARSAL FX WO MNP EA: CPT | Performed by: PODIATRIST

## 2019-05-30 PROCEDURE — 3017F COLORECTAL CA SCREEN DOC REV: CPT | Performed by: PODIATRIST

## 2019-05-30 PROCEDURE — G8420 CALC BMI NORM PARAMETERS: HCPCS | Performed by: PODIATRIST

## 2019-05-30 PROCEDURE — 4004F PT TOBACCO SCREEN RCVD TLK: CPT | Performed by: PODIATRIST

## 2019-05-30 PROCEDURE — 99213 OFFICE O/P EST LOW 20 MIN: CPT | Performed by: PODIATRIST

## 2019-05-30 PROCEDURE — G8599 NO ASA/ANTIPLAT THER USE RNG: HCPCS | Performed by: PODIATRIST

## 2019-05-30 PROCEDURE — G8427 DOCREV CUR MEDS BY ELIG CLIN: HCPCS | Performed by: PODIATRIST

## 2019-05-30 NOTE — LETTER
[address] [facility]  [facility]  Phone: [phone]  Fax: [phone], DPM        May 30, 2019     Patient: Vero Lund   YOB: 1960   Date of Visit: 5/30/2019       To Whom It May Concern: It is my medical opinion that Boubacar Kaur should remain out of work until re-evaluated on July 11th, 2019. If you have any questions or concerns, please don't hesitate to call.     Sincerely,        Evelina Poole DPM

## 2019-05-30 NOTE — PROGRESS NOTES
Subjective:    Brianne Koenig is a 63 y.o. female who presents with the R foot pain . Pt had her testing done. .  Patient has been compliant with off loading outside of work. . Patient relates pain is Present. Pain is rated 5 out of 10 and is described as waxing and waning, moderate. Mechanism of injury was none. Other treatment has been none. Past Medical History:   Diagnosis Date    [address] ([address] obstructive [facility] disease) [address] [address] [facility]     Hemorrhoids     History of herpes zoster 2002    (right lower rib area laterally and anteriorly).  History of thyroiditis 1992    History of ulcerative colitis     (of 20 years disease - though quiescent).  Hypercholesterolemia     Hypertension     Neuritis     Shortened [place] interval     (on EKG from 2004).  Tobacco abuse     Venous insufficiency, peripheral     (bilateral). Social History     Socioeconomic History    Marital status:      Spouse name: None    Number of children: None    Years of education: None    Highest education level: None   Occupational History    None   Social Needs    Financial resource strain: None    Food insecurity:     Worry: None     Inability: None    Transportation needs:     Medical: None     Non-medical: None   Tobacco Use    Smoking status: Current Every Day Smoker     Packs/day: 0.50     Years: 15.00     Pack years: 7.50    Smokeless tobacco: Never Used   Substance and Sexual Activity    Alcohol use:  Yes     Alcohol/week: 0.0 oz     Comment: occasional    Drug use: No    Sexual activity: Yes     Partners: Male   Lifestyle    Physical activity:     Days per week: None     Minutes per session: None    Stress: None   Relationships    Social connections:     Talks on phone: None     Gets together: None     Attends Moravian service: None     Active member of club or organization: None     Attends meetings of clubs or organizations: None     Relationship status: None    Intimate partner violence: Fear of current or ex partner: None     Emotionally abused: None     Physically abused: None     Forced sexual activity: None   Other Topics Concern    None   Social History Narrative    None     Current Outpatient Medications   Medication Sig Dispense Refill    diclofenac (VOLTAREN) 50 MG EC tablet Take 1 tablet by mouth 3 times daily (with meals) 60 tablet 3    atorvastatin (LIPITOR) 20 MG tablet Take 1 tablet by mouth daily 30 tablet 6    meloxicam (MOBIC) 15 MG tablet Take 1 tablet by mouth daily 30 tablet 6    lisinopril (PRINIVIL;ZESTRIL) 10 MG tablet Take 1 tablet by mouth daily 30 tablet 6    cyclobenzaprine (FLEXERIL) 10 MG tablet take 1 tablet by mouth three times a day if needed for muscle spasm 45 tablet 0    neomycin-polymyxin-hydrocortisone (CORTISPORIN) 3.5-18922-5 otic suspension 3 gtts BID to L ear 1 Bottle 1    traZODone (DESYREL) 50 MG tablet Take 1 tablet by mouth nightly 30 tablet 6    albuterol sulfate  (90 Base) MCG/ACT inhaler Inhale 2 puffs into the lungs every 6 hours as needed for Wheezing 1 Inhaler 11    Glucosamine-Chondroit-Vit C-Mn (GLUCOSAMINE 1500 COMPLEX PO) Take by mouth daily      CRANBERRY PO Take by mouth daily      Multiple Vitamins-Minerals (HAIR/SKIN/NAILS PO) Take by mouth daily      ascorbic acid (VITAMIN C) 500 MG tablet Take 500 mg by mouth daily. Current Facility-Administered Medications   Medication Dose Route Frequency Provider Last Rate Last Dose    albuterol (ACCUNEB) nebulizer solution 0.63 mg  0.63 mg Nebulization Once Vanesa Johnson MD         Facility-Administered Medications Ordered in Other Visits   Medication Dose Route Frequency Provider Last Rate Last Dose    technetium medronate (Tc-MDP) injection 25 millicurie  25 millicurie Intravenous ONCE PRN Tomasa Carolina DPM         No Known Allergies    Review of Systems: All 12 systems reviewed and pertinent positives noted above.     Lower Extremity Physical Examination:

## 2019-05-31 ENCOUNTER — TELEPHONE (OUTPATIENT)
Dept: PODIATRY | Age: 59
End: 2019-05-31

## 2019-05-31 ENCOUNTER — TELEPHONE (OUTPATIENT)
Dept: PRIMARY CARE CLINIC | Age: 59
End: 2019-05-31

## 2019-06-03 ENCOUNTER — HOSPITAL ENCOUNTER (OUTPATIENT)
Dept: LAB | Age: 59
Discharge: HOME | End: 2019-06-03
Payer: MEDICAID

## 2019-06-03 ENCOUNTER — TELEPHONE (OUTPATIENT)
Dept: FAMILY MEDICINE CLINIC | Age: 59
End: 2019-06-03

## 2019-06-03 DIAGNOSIS — E78.00 HYPERCHOLESTEROLEMIA: ICD-10-CM

## 2019-06-03 DIAGNOSIS — I10 ESSENTIAL HYPERTENSION: ICD-10-CM

## 2019-06-03 DIAGNOSIS — Z01.419 WOMEN'S ANNUAL ROUTINE GYNECOLOGICAL EXAMINATION: ICD-10-CM

## 2019-06-03 LAB
ALBUMIN SERPL-MCNC: 4.6 G/DL (ref 3.5–5.2)
ALBUMIN/GLOBULIN RATIO: 1.6 (ref 1–2.5)
ALP BLD-CCNC: 70 U/L (ref 35–104)
ALT SERPL-CCNC: 23 U/L (ref 5–33)
ANION GAP SERPL CALCULATED.3IONS-SCNC: 10 MMOL/L (ref 9–17)
AST SERPL-CCNC: 19 U/L
BILIRUB SERPL-MCNC: 0.28 MG/DL (ref 0.3–1.2)
BUN BLDV-MCNC: 28 MG/DL (ref 6–20)
BUN/CREAT BLD: 20 (ref 9–20)
CALCIUM SERPL-MCNC: 9.9 MG/DL (ref 8.6–10.4)
CHLORIDE BLD-SCNC: 104 MMOL/L (ref 98–107)
CHOLESTEROL/HDL RATIO: 3.8
CHOLESTEROL: 193 MG/DL
CO2: 23 MMOL/L (ref 20–31)
CREAT SERPL-MCNC: 1.4 MG/DL (ref 0.5–0.9)
GFR AFRICAN AMERICAN: 47 ML/MIN
GFR NON-AFRICAN AMERICAN: 39 ML/MIN
GFR SERPL CREATININE-BSD FRML MDRD: ABNORMAL ML/MIN/{1.73_M2}
GFR SERPL CREATININE-BSD FRML MDRD: ABNORMAL ML/MIN/{1.73_M2}
GLUCOSE FASTING: 101 MG/DL (ref 70–99)
HDLC SERPL-MCNC: 51 MG/DL
LDL CHOLESTEROL: 113 MG/DL (ref 0–130)
POTASSIUM SERPL-SCNC: 5.1 MMOL/L (ref 3.7–5.3)
SODIUM BLD-SCNC: 137 MMOL/L (ref 135–144)
TOTAL PROTEIN: 7.4 G/DL (ref 6.4–8.3)
TRIGL SERPL-MCNC: 143 MG/DL
VITAMIN D 25-HYDROXY: 20.2 NG/ML (ref 30–100)
VLDLC SERPL CALC-MCNC: NORMAL MG/DL (ref 1–30)

## 2019-06-03 PROCEDURE — 80053 COMPREHEN METABOLIC PANEL: CPT

## 2019-06-03 PROCEDURE — 36415 COLL VENOUS BLD VENIPUNCTURE: CPT

## 2019-06-03 PROCEDURE — 80061 LIPID PANEL: CPT

## 2019-06-03 PROCEDURE — 82306 VITAMIN D 25 HYDROXY: CPT

## 2019-06-03 NOTE — TELEPHONE ENCOUNTER
Pt calling stating she saw Dr Sandeep Wright for a stress fx and was given anti-inflammatories but pt is requesting something for the pain, pt uses above loaded pharmacy, please advise at above number.

## 2019-06-05 DIAGNOSIS — E55.9 VITAMIN D DEFICIENCY: Primary | ICD-10-CM

## 2019-06-05 RX ORDER — ERGOCALCIFEROL 1.25 MG/1
50000 CAPSULE ORAL WEEKLY
Qty: 12 CAPSULE | Refills: 3 | Status: SHIPPED | OUTPATIENT
Start: 2019-06-05 | End: 2019-09-26

## 2019-06-05 NOTE — RESULT ENCOUNTER NOTE
Abnormal test results, please notify patient. Prescription sent into pharmacy of record. Jaylene vit D level in 3-4 months.   DA/CNM

## 2019-06-11 ENCOUNTER — OFFICE VISIT (OUTPATIENT)
Dept: OTOLARYNGOLOGY | Age: 59
End: 2019-06-11
Payer: MEDICAID

## 2019-06-11 VITALS
HEIGHT: 64 IN | WEIGHT: 140 LBS | HEART RATE: 74 BPM | DIASTOLIC BLOOD PRESSURE: 78 MMHG | BODY MASS INDEX: 23.9 KG/M2 | SYSTOLIC BLOOD PRESSURE: 128 MMHG | RESPIRATION RATE: 14 BRPM

## 2019-06-11 DIAGNOSIS — H66.90 CHRONIC OTITIS MEDIA, UNSPECIFIED OTITIS MEDIA TYPE: Primary | ICD-10-CM

## 2019-06-11 PROCEDURE — G8420 CALC BMI NORM PARAMETERS: HCPCS | Performed by: OTOLARYNGOLOGY

## 2019-06-11 PROCEDURE — G8599 NO ASA/ANTIPLAT THER USE RNG: HCPCS | Performed by: OTOLARYNGOLOGY

## 2019-06-11 PROCEDURE — G8427 DOCREV CUR MEDS BY ELIG CLIN: HCPCS | Performed by: OTOLARYNGOLOGY

## 2019-06-11 PROCEDURE — 3017F COLORECTAL CA SCREEN DOC REV: CPT | Performed by: OTOLARYNGOLOGY

## 2019-06-11 PROCEDURE — 99213 OFFICE O/P EST LOW 20 MIN: CPT | Performed by: OTOLARYNGOLOGY

## 2019-06-11 PROCEDURE — 4004F PT TOBACCO SCREEN RCVD TLK: CPT | Performed by: OTOLARYNGOLOGY

## 2019-06-11 NOTE — PROGRESS NOTES
Lauryn Failing  6/11/19    Chief Complaint   Patient presents with    Ear Problem     3 month follow up       HPI:  fu for retraction pocket AS, hx mult AU surgeries, has TORP? PORP AD, on CTS gtts AS, doing well    Past Med Hx:     Past Medical History:   Diagnosis Date    [facility] (chronic obstructive pulmonary disease) [address]     Hemorrhoids     History of herpes zoster 2002    (right lower rib area laterally and anteriorly).  History of thyroiditis 1992    History of ulcerative colitis     (of 20 years disease - though quiescent).  Hypercholesterolemia     Hypertension     Neuritis     Shortened NM interval     (on EKG from 2004).  Tobacco abuse     Venous insufficiency, peripheral     (bilateral). Past Surgical History:   Procedure Laterality Date    APPENDECTOMY  1982    (Dr. Tamir Eli) - Acute appendicitis.  CARDIAC CATHETERIZATION  2000    Preserved left ventricular systolic function. Ejection fraction 50%. Noncritical coronary disease. (Dr. Phuong Cooper).  CHOLECYSTECTOMY  2001    COLONOSCOPY  1988    (Dr. Killian Grier) - Normal colon.  COLONOSCOPY  5/12/16    Polyp distal sigmoid, rectum - Dr Victorino Horowitz  2010    Incision and drainage of thrombosed hemorrhoid.  HYSTERECTOMY  2001    (no BSO).  OTHER SURGICAL HISTORY      Ear surgery x8.    OTHER SURGICAL HISTORY  1987    Pelvic exam under anesthesia. (Dr. Juan Andre) - Chronic pelvic pain, chronic inflammatory process changes.  TUBAL LIGATION  1982       Family History:     Family History   Problem Relation Age of Onset    Diabetes Mother     Other Mother         (history of polyps).    [place] Arthritis Mother     Cancer Mother         breast    COPD Father     Other Father         ('s disease)    Mental Illness Daughter         bipolar; schizophrenia       Social Hx:     Social History     Socioeconomic History    Marital status:      Spouse name: Not on file    Number of children: Not on lisinopril (PRINIVIL;ZESTRIL) 10 MG tablet, Take 1 tablet by mouth daily, Disp: 30 tablet, Rfl: 6    cyclobenzaprine (FLEXERIL) 10 MG tablet, take 1 tablet by mouth three times a day if needed for muscle spasm, Disp: 45 tablet, Rfl: 0    neomycin-polymyxin-hydrocortisone (CORTISPORIN) 3.5-89367-4 otic suspension, 3 gtts BID to L ear, Disp: 1 Bottle, Rfl: 1    traZODone (DESYREL) 50 MG tablet, Take 1 tablet by mouth nightly, Disp: 30 tablet, Rfl: 6    albuterol sulfate  (90 Base) MCG/ACT inhaler, Inhale 2 puffs into the lungs every 6 hours as needed for Wheezing, Disp: 1 Inhaler, Rfl: 11    Glucosamine-Chondroit-Vit C-Mn (GLUCOSAMINE 1500 COMPLEX PO), Take by mouth daily, Disp: , Rfl:     CRANBERRY PO, Take by mouth daily, Disp: , Rfl:     Multiple Vitamins-Minerals (HAIR/SKIN/NAILS PO), Take by mouth daily, Disp: , Rfl:     ascorbic acid (VITAMIN C) 500 MG tablet, Take 500 mg by mouth daily. , Disp: , Rfl:      ROS:   CV: copd   Endocrine:    Resp:     GI:       Neuro:   PE:     General appearance:  Normal                 Ability to Communicate:  Normal       Head & Face:  Normal   Salivary Glands:  Normal              Facial Strength:  Normal   Ears:    Pinna:  Normal            EAC:  Normal      TMs:  AD TORP, AS hard crust removed under scope, ciprodex applied    [place]:  [place]     Finger Rub     Nose:    External: Normal    Septum:  Normal    Turbinates: Normal             Nasal Cavity: Normal         Naso Pharyngoscopy:     Nasal Endoscopy:      Oral Cavity & Oral Pharynx:    Tongue:  Normal    Teeth & Gums:             Palate:  Mari     Tonsils:      FOM:  Normal     Other:      Neck:    Thyroid:Normal       Lymph nodes: Normal           Trachea:  Normal      Masses:  Normal    Other:        Eyes:    EOMs:      Nystagmus:      Neurological:    CN V:      CN VII:       Gait & Station:      Romberg:      Tandem Gait:      [place]:       Oriented x 3: Normal     Affect: Normal    Data reviewed:      ASSESSMENT:   Diagnosis Orders   1. Chronic otitis media, unspecified otitis media type         PLAN:cont CTS gtts see 3 mos  Return in about 3 months (around 9/11/2019). No orders of the defined types were placed in this encounter.         Estefany Valdez MD

## 2019-06-20 ENCOUNTER — OFFICE VISIT (OUTPATIENT)
Dept: PODIATRY | Age: 59
End: 2019-06-20

## 2019-06-20 ENCOUNTER — HOSPITAL ENCOUNTER (OUTPATIENT)
Dept: GENERAL RADIOLOGY | Age: 59
Discharge: HOME | End: 2019-06-22
Payer: MEDICAID

## 2019-06-20 VITALS
DIASTOLIC BLOOD PRESSURE: 78 MMHG | BODY MASS INDEX: 23.9 KG/M2 | HEIGHT: 64 IN | WEIGHT: 140 LBS | HEART RATE: 68 BPM | SYSTOLIC BLOOD PRESSURE: 124 MMHG

## 2019-06-20 DIAGNOSIS — M84.374D STRESS FRACTURE OF RIGHT FOOT WITH ROUTINE HEALING, SUBSEQUENT ENCOUNTER: Primary | ICD-10-CM

## 2019-06-20 DIAGNOSIS — M79.671 RIGHT FOOT PAIN: ICD-10-CM

## 2019-06-20 DIAGNOSIS — M84.374A STRESS FRACTURE OF RIGHT FOOT, INITIAL ENCOUNTER: ICD-10-CM

## 2019-06-20 PROCEDURE — 73630 X-RAY EXAM OF FOOT: CPT

## 2019-06-20 PROCEDURE — 99024 POSTOP FOLLOW-UP VISIT: CPT | Performed by: PODIATRIST

## 2019-06-20 NOTE — PROGRESS NOTES
Subjective:    Geeta Walters is a 63 y.o. female who presents with the R foot stress fracture. Patient has been compliant with off loading shoe. Patient relates pain is Present but improved. Pain is rated 2 out of 10 and is described as waxing and waning, mild. No n/v/f/c/d. Past Medical History:   Diagnosis Date    COPD (chronic obstructive pulmonary disease) ([address]     Hemorrhoids     History of herpes zoster 2002    (right lower rib area laterally and anteriorly).  History of thyroiditis 1992    History of ulcerative colitis     (of 20 years disease - though quiescent).  Hypercholesterolemia     Hypertension     Neuritis     Shortened AL interval     (on EKG from 2004).  Tobacco abuse     Venous insufficiency, peripheral     (bilateral). Social History     Socioeconomic History    Marital status:      Spouse name: None    Number of children: None    Years of education: None    Highest education level: None   Occupational History    None   Social Needs    Financial resource strain: None    Food insecurity:     Worry: None     Inability: None    Transportation needs:     Medical: None     Non-medical: None   Tobacco Use    Smoking status: Current Every Day Smoker     Packs/day: 0.50     Years: 15.00     Pack years: 7.50    Smokeless tobacco: Never Used   Substance and Sexual Activity    Alcohol use:  Yes     Alcohol/week: 0.0 oz     Comment: occasional    Drug use: No    Sexual activity: Yes     Partners: Male   Lifestyle    Physical activity:     Days per week: None     Minutes per session: None    Stress: None   Relationships    Social connections:     Talks on phone: None     Gets together: None     Attends Quaker service: None     Active member of club or organization: None     Attends meetings of clubs or organizations: None     Relationship status: None    Intimate partner violence:     Fear of current or ex partner: None     Emotionally abused: None Physically abused: None     Forced sexual activity: None   Other Topics Concern    None   Social History Narrative    None     Current Outpatient Medications   Medication Sig Dispense Refill    vitamin D (ERGOCALCIFEROL) 82593 units CAPS capsule Take 1 capsule by mouth once a week 12 capsule 3    diclofenac (VOLTAREN) 50 MG EC tablet Take 1 tablet by mouth 3 times daily (with meals) 60 tablet 0    diclofenac (VOLTAREN) 50 MG EC tablet Take 1 tablet by mouth 3 times daily (with meals) 60 tablet 3    atorvastatin (LIPITOR) 20 MG tablet Take 1 tablet by mouth daily 30 tablet 6    lisinopril (PRINIVIL;ZESTRIL) 10 MG tablet Take 1 tablet by mouth daily 30 tablet 6    cyclobenzaprine (FLEXERIL) 10 MG tablet take 1 tablet by mouth three times a day if needed for muscle spasm 45 tablet 0    neomycin-polymyxin-hydrocortisone (CORTISPORIN) 3.5-03065-3 otic suspension 3 gtts BID to L ear 1 Bottle 1    traZODone (DESYREL) 50 MG tablet Take 1 tablet by mouth nightly 30 tablet 6    albuterol sulfate  (90 Base) MCG/ACT inhaler Inhale 2 puffs into the lungs every 6 hours as needed for Wheezing 1 Inhaler 11    Glucosamine-Chondroit-Vit C-Mn (GLUCOSAMINE 1500 COMPLEX PO) Take by mouth daily      CRANBERRY PO Take by mouth daily      Multiple Vitamins-Minerals (HAIR/SKIN/NAILS PO) Take by mouth daily      ascorbic acid (VITAMIN C) 500 MG tablet Take 500 mg by mouth daily.  meloxicam (MOBIC) 15 MG tablet Take 1 tablet by mouth daily 30 tablet 6     Current Facility-Administered Medications   Medication Dose Route Frequency Provider Last Rate Last Dose    albuterol (ACCUNEB) nebulizer solution 0.63 mg  0.63 mg Nebulization Once Feliciano Villafuerte MD         No Known Allergies    Review of Systems: All 12 systems reviewed and pertinent positives noted above. Lower Extremity Physical Examination:     Vitals:   Vitals:    06/20/19 1038   BP: 124/78   Pulse: 68     General: AAO x 3 in NAD.    Vascular:

## 2019-07-11 ENCOUNTER — OFFICE VISIT (OUTPATIENT)
Dept: PODIATRY | Age: 59
End: 2019-07-11

## 2019-07-11 ENCOUNTER — HOSPITAL ENCOUNTER (OUTPATIENT)
Dept: GENERAL RADIOLOGY | Age: 59
Discharge: HOME | End: 2019-07-13
Payer: MEDICAID

## 2019-07-11 VITALS
WEIGHT: 141 LBS | DIASTOLIC BLOOD PRESSURE: 72 MMHG | BODY MASS INDEX: 24.07 KG/M2 | HEIGHT: 64 IN | HEART RATE: 76 BPM | SYSTOLIC BLOOD PRESSURE: 118 MMHG

## 2019-07-11 DIAGNOSIS — M79.671 RIGHT FOOT PAIN: ICD-10-CM

## 2019-07-11 DIAGNOSIS — M84.374D STRESS FRACTURE OF RIGHT FOOT WITH ROUTINE HEALING, SUBSEQUENT ENCOUNTER: Primary | ICD-10-CM

## 2019-07-11 PROCEDURE — 99024 POSTOP FOLLOW-UP VISIT: CPT | Performed by: PODIATRIST

## 2019-07-11 PROCEDURE — 73630 X-RAY EXAM OF FOOT: CPT

## 2019-07-11 NOTE — PROGRESS NOTES
DP and PT pulses palpable 2/4, bilateral.  CFT <3 seconds, bilateral.  Hair growth present to the level of the digits, bilateral.  Edema Present, bilateral.  Ecchymosis is Absent . Varicosities absent, bilateral. Erythema absent, bilateral. Distal Rubor absent bilateral.  Temperature within normal limits bilateral. Hyperpigmentation absent bilateral. No atrophic skin. Neurological: Sensation intact to light touch to level of digits, bilateral.  Protective sensation intact 10/10 sites via 5.07/10g Candor-Serafin Monofilament, bilateral.  negative Tinel's, bilateral.  negative Valleix sign, bilateral.  Vibratory intact bilateral.  Reflexes Decreased bilateral.  Paresthesias negative. Dysthesias negative. Sharp/dull intact bilateral.  Musculoskeletal: Muscle strength 5/5, Bilateral.  Pain absent upon palpation of dorsal 3 met (improved a lot), Right. within normal limits medial longitudinal arch, Bilateral.  Ankle ROM within normal limits,Bilateral.  1st MPJ ROM within normal limits, Bilateral.  Dorsally contracted digits absent digits none Bilateral. Other foot deformities none. Integument: Warm, dry, supple, bilateral.  Open lesion absent, bilateral.  Interdigital maceration absent to web spaces bilateral.  Nails within normal limits. Fissures absent, bilateral. Hyperkeratotic tissue is absent. Radiographic interpretation: bone callous R 3rd met    Assessment:   Diagnosis Orders   1. Stress fracture of right foot with routine healing, subsequent encounter     2. Right foot pain         Plan:  Patient condition was discussed and all questions answered. Patient will continue fracture care due to the R foot fracture. Patient will ice and elevate as directed. Patient will be FWB with use of regular shoe for appropriate offloading. Pt still off work at this time for 2 more weeks.    Education on normal bone healing in 4-6 weeks and risks of bone healing complications including malunion, delayed union, and

## 2019-07-19 ENCOUNTER — TELEPHONE (OUTPATIENT)
Dept: OTOLARYNGOLOGY | Age: 59
End: 2019-07-19

## 2019-08-04 ENCOUNTER — HOSPITAL ENCOUNTER (INPATIENT)
Age: 59
LOS: 1 days | Discharge: HOME | DRG: 463 | End: 2019-08-05
Attending: EMERGENCY MEDICINE | Admitting: FAMILY MEDICINE
Payer: MEDICAID

## 2019-08-04 ENCOUNTER — APPOINTMENT (OUTPATIENT)
Dept: CT IMAGING | Age: 59
DRG: 463 | End: 2019-08-04
Payer: MEDICAID

## 2019-08-04 DIAGNOSIS — N10 ACUTE PYELONEPHRITIS: ICD-10-CM

## 2019-08-04 DIAGNOSIS — N20.0 KIDNEY STONE: Primary | ICD-10-CM

## 2019-08-04 DIAGNOSIS — M19.90 ARTHRITIS: ICD-10-CM

## 2019-08-04 DIAGNOSIS — N13.2 HYDRONEPHROSIS WITH OBSTRUCTING CALCULUS: ICD-10-CM

## 2019-08-04 LAB
-: ABNORMAL
ABSOLUTE EOS #: 0 K/UL (ref 0–0.4)
ABSOLUTE IMMATURE GRANULOCYTE: ABNORMAL K/UL (ref 0–0.3)
ABSOLUTE LYMPH #: 0.34 K/UL (ref 1–4.8)
ABSOLUTE MONO #: 0.67 K/UL (ref 0.1–1.2)
AMORPHOUS: ABNORMAL
AMYLASE: 37 U/L (ref 28–100)
ANION GAP SERPL CALCULATED.3IONS-SCNC: 14 MMOL/L (ref 9–17)
BACTERIA: ABNORMAL
BASOPHILS # BLD: 0 % (ref 0–1)
BASOPHILS ABSOLUTE: 0 K/UL (ref 0–0.2)
BILIRUBIN URINE: NEGATIVE
BUN BLDV-MCNC: 22 MG/DL (ref 6–20)
BUN/CREAT BLD: 17 (ref 9–20)
CALCIUM SERPL-MCNC: 9.4 MG/DL (ref 8.6–10.4)
CASTS UA: ABNORMAL /LPF (ref 0–2)
CHLORIDE BLD-SCNC: 99 MMOL/L (ref 98–107)
CO2: 26 MMOL/L (ref 20–31)
COLOR: ABNORMAL
COMMENT UA: ABNORMAL
CREAT SERPL-MCNC: 1.3 MG/DL (ref 0.5–0.9)
CRYSTALS, UA: ABNORMAL /HPF
DIFFERENTIAL TYPE: ABNORMAL
EKG ATRIAL RATE: 99 BPM
EKG P AXIS: 63 DEGREES
EKG P-R INTERVAL: 128 MS
EKG Q-T INTERVAL: 348 MS
EKG QRS DURATION: 84 MS
EKG QTC CALCULATION (BAZETT): 446 MS
EKG R AXIS: 73 DEGREES
EKG T AXIS: 49 DEGREES
EKG VENTRICULAR RATE: 99 BPM
EOSINOPHILS RELATIVE PERCENT: 0 % (ref 1–7)
EPITHELIAL CELLS UA: ABNORMAL /HPF (ref 0–5)
GFR AFRICAN AMERICAN: 51 ML/MIN
GFR NON-AFRICAN AMERICAN: 42 ML/MIN
GFR SERPL CREATININE-BSD FRML MDRD: ABNORMAL ML/MIN/{1.73_M2}
GFR SERPL CREATININE-BSD FRML MDRD: ABNORMAL ML/MIN/{1.73_M2}
GLUCOSE BLD-MCNC: 143 MG/DL (ref 70–99)
GLUCOSE URINE: NEGATIVE
HCT VFR BLD CALC: 39 % (ref 36–46)
HEMOGLOBIN: 13.1 G/DL (ref 12–16)
IMMATURE GRANULOCYTES: ABNORMAL %
KETONES, URINE: NEGATIVE
LACTIC ACID: 1.5 MMOL/L (ref 0.5–2.2)
LEUKOCYTE ESTERASE, URINE: ABNORMAL
LIPASE: 25 U/L (ref 13–60)
LYMPHOCYTES # BLD: 2 % (ref 16–46)
MCH RBC QN AUTO: 31.2 PG (ref 26–34)
MCHC RBC AUTO-ENTMCNC: 33.6 G/DL (ref 31–37)
MCV RBC AUTO: 92.9 FL (ref 80–100)
MONOCYTES # BLD: 4 % (ref 4–11)
MORPHOLOGY: ABNORMAL
MORPHOLOGY: ABNORMAL
MUCUS: ABNORMAL
NITRITE, URINE: POSITIVE
NRBC AUTOMATED: ABNORMAL PER 100 WBC
OTHER OBSERVATIONS UA: ABNORMAL
PDW BLD-RTO: 14.3 % (ref 11–14.5)
PH UA: 7 (ref 5–6)
PLATELET # BLD: 184 K/UL (ref 140–450)
PLATELET ESTIMATE: ABNORMAL
PMV BLD AUTO: 8.1 FL (ref 6–12)
POTASSIUM SERPL-SCNC: 4.1 MMOL/L (ref 3.7–5.3)
PROTEIN UA: ABNORMAL
RBC # BLD: 4.2 M/UL (ref 4–5.2)
RBC # BLD: ABNORMAL 10*6/UL
RBC UA: ABNORMAL /HPF (ref 0–4)
RENAL EPITHELIAL, UA: ABNORMAL /HPF
SEG NEUTROPHILS: 94 % (ref 43–77)
SEGMENTED NEUTROPHILS ABSOLUTE COUNT: 15.79 K/UL (ref 1.8–7.7)
SODIUM BLD-SCNC: 139 MMOL/L (ref 135–144)
SPECIFIC GRAVITY UA: 1.01 (ref 1.01–1.02)
TRICHOMONAS: ABNORMAL
TROPONIN INTERP: NORMAL
TROPONIN T: NORMAL NG/ML
TROPONIN, HIGH SENSITIVITY: <6 NG/L (ref 0–14)
TURBIDITY: ABNORMAL
URINE HGB: ABNORMAL
UROBILINOGEN, URINE: NORMAL
WBC # BLD: 16.8 K/UL (ref 3.5–11)
WBC # BLD: ABNORMAL 10*3/UL
WBC UA: >50 /HPF (ref 0–4)
YEAST: ABNORMAL

## 2019-08-04 PROCEDURE — 83605 ASSAY OF LACTIC ACID: CPT

## 2019-08-04 PROCEDURE — 82150 ASSAY OF AMYLASE: CPT

## 2019-08-04 PROCEDURE — 6370000000 HC RX 637 (ALT 250 FOR IP): Performed by: NURSE PRACTITIONER

## 2019-08-04 PROCEDURE — 87086 URINE CULTURE/COLONY COUNT: CPT

## 2019-08-04 PROCEDURE — 96375 TX/PRO/DX INJ NEW DRUG ADDON: CPT

## 2019-08-04 PROCEDURE — 87186 SC STD MICRODIL/AGAR DIL: CPT

## 2019-08-04 PROCEDURE — 6360000002 HC RX W HCPCS: Performed by: EMERGENCY MEDICINE

## 2019-08-04 PROCEDURE — 96372 THER/PROPH/DIAG INJ SC/IM: CPT

## 2019-08-04 PROCEDURE — 99285 EMERGENCY DEPT VISIT HI MDM: CPT

## 2019-08-04 PROCEDURE — 96361 HYDRATE IV INFUSION ADD-ON: CPT

## 2019-08-04 PROCEDURE — 1200000000 HC SEMI PRIVATE

## 2019-08-04 PROCEDURE — G0378 HOSPITAL OBSERVATION PER HR: HCPCS

## 2019-08-04 PROCEDURE — 2580000003 HC RX 258: Performed by: NURSE PRACTITIONER

## 2019-08-04 PROCEDURE — 96365 THER/PROPH/DIAG IV INF INIT: CPT

## 2019-08-04 PROCEDURE — 93005 ELECTROCARDIOGRAM TRACING: CPT | Performed by: EMERGENCY MEDICINE

## 2019-08-04 PROCEDURE — 6360000002 HC RX W HCPCS: Performed by: NURSE PRACTITIONER

## 2019-08-04 PROCEDURE — 87077 CULTURE AEROBIC IDENTIFY: CPT

## 2019-08-04 PROCEDURE — 74176 CT ABD & PELVIS W/O CONTRAST: CPT

## 2019-08-04 PROCEDURE — 2580000003 HC RX 258: Performed by: EMERGENCY MEDICINE

## 2019-08-04 PROCEDURE — 81001 URINALYSIS AUTO W/SCOPE: CPT

## 2019-08-04 PROCEDURE — 85025 COMPLETE CBC W/AUTO DIFF WBC: CPT

## 2019-08-04 PROCEDURE — 94760 N-INVAS EAR/PLS OXIMETRY 1: CPT

## 2019-08-04 PROCEDURE — 84484 ASSAY OF TROPONIN QUANT: CPT

## 2019-08-04 PROCEDURE — 83690 ASSAY OF LIPASE: CPT

## 2019-08-04 PROCEDURE — 99222 1ST HOSP IP/OBS MODERATE 55: CPT | Performed by: FAMILY MEDICINE

## 2019-08-04 PROCEDURE — 80048 BASIC METABOLIC PNL TOTAL CA: CPT

## 2019-08-04 RX ORDER — TAMSULOSIN HYDROCHLORIDE 0.4 MG/1
0.4 CAPSULE ORAL DAILY
Status: DISCONTINUED | OUTPATIENT
Start: 2019-08-04 | End: 2019-08-05 | Stop reason: HOSPADM

## 2019-08-04 RX ORDER — ACETAMINOPHEN 325 MG/1
650 TABLET ORAL EVERY 4 HOURS PRN
Status: DISCONTINUED | OUTPATIENT
Start: 2019-08-04 | End: 2019-08-05 | Stop reason: HOSPADM

## 2019-08-04 RX ORDER — FAMOTIDINE 20 MG/1
20 TABLET, FILM COATED ORAL 2 TIMES DAILY
Status: DISCONTINUED | OUTPATIENT
Start: 2019-08-04 | End: 2019-08-05 | Stop reason: HOSPADM

## 2019-08-04 RX ORDER — 0.9 % SODIUM CHLORIDE 0.9 %
1000 INTRAVENOUS SOLUTION INTRAVENOUS ONCE
Status: COMPLETED | OUTPATIENT
Start: 2019-08-04 | End: 2019-08-04

## 2019-08-04 RX ORDER — MORPHINE SULFATE 4 MG/ML
4 INJECTION, SOLUTION INTRAMUSCULAR; INTRAVENOUS
Status: DISCONTINUED | OUTPATIENT
Start: 2019-08-04 | End: 2019-08-05 | Stop reason: HOSPADM

## 2019-08-04 RX ORDER — ONDANSETRON 2 MG/ML
4 INJECTION INTRAMUSCULAR; INTRAVENOUS EVERY 6 HOURS PRN
Status: DISCONTINUED | OUTPATIENT
Start: 2019-08-04 | End: 2019-08-05 | Stop reason: HOSPADM

## 2019-08-04 RX ORDER — SODIUM CHLORIDE 0.9 % (FLUSH) 0.9 %
10 SYRINGE (ML) INJECTION PRN
Status: DISCONTINUED | OUTPATIENT
Start: 2019-08-04 | End: 2019-08-05 | Stop reason: HOSPADM

## 2019-08-04 RX ORDER — MORPHINE SULFATE 2 MG/ML
2 INJECTION, SOLUTION INTRAMUSCULAR; INTRAVENOUS
Status: DISCONTINUED | OUTPATIENT
Start: 2019-08-04 | End: 2019-08-05 | Stop reason: HOSPADM

## 2019-08-04 RX ORDER — LEVALBUTEROL INHALATION SOLUTION 0.63 MG/3ML
0.63 SOLUTION RESPIRATORY (INHALATION) ONCE
Status: DISCONTINUED | OUTPATIENT
Start: 2019-08-04 | End: 2019-08-05 | Stop reason: HOSPADM

## 2019-08-04 RX ORDER — ASCORBIC ACID 500 MG
500 TABLET ORAL DAILY
Status: DISCONTINUED | OUTPATIENT
Start: 2019-08-04 | End: 2019-08-05 | Stop reason: HOSPADM

## 2019-08-04 RX ORDER — ERGOCALCIFEROL 1.25 MG/1
50000 CAPSULE ORAL WEEKLY
Status: DISCONTINUED | OUTPATIENT
Start: 2019-08-10 | End: 2019-08-05 | Stop reason: HOSPADM

## 2019-08-04 RX ORDER — ATORVASTATIN CALCIUM 10 MG/1
20 TABLET, FILM COATED ORAL DAILY
Status: DISCONTINUED | OUTPATIENT
Start: 2019-08-04 | End: 2019-08-05 | Stop reason: HOSPADM

## 2019-08-04 RX ORDER — NICOTINE 21 MG/24HR
1 PATCH, TRANSDERMAL 24 HOURS TRANSDERMAL DAILY PRN
Status: DISCONTINUED | OUTPATIENT
Start: 2019-08-04 | End: 2019-08-05 | Stop reason: HOSPADM

## 2019-08-04 RX ORDER — HYDROCODONE BITARTRATE AND ACETAMINOPHEN 5; 325 MG/1; MG/1
2 TABLET ORAL EVERY 4 HOURS PRN
Status: DISCONTINUED | OUTPATIENT
Start: 2019-08-04 | End: 2019-08-05 | Stop reason: HOSPADM

## 2019-08-04 RX ORDER — HYDROCODONE BITARTRATE AND ACETAMINOPHEN 5; 325 MG/1; MG/1
1 TABLET ORAL EVERY 4 HOURS PRN
Status: DISCONTINUED | OUTPATIENT
Start: 2019-08-04 | End: 2019-08-05 | Stop reason: HOSPADM

## 2019-08-04 RX ORDER — MORPHINE SULFATE 2 MG/ML
2 INJECTION, SOLUTION INTRAMUSCULAR; INTRAVENOUS ONCE
Status: COMPLETED | OUTPATIENT
Start: 2019-08-04 | End: 2019-08-04

## 2019-08-04 RX ORDER — KETOROLAC TROMETHAMINE 30 MG/ML
15 INJECTION, SOLUTION INTRAMUSCULAR; INTRAVENOUS ONCE
Status: COMPLETED | OUTPATIENT
Start: 2019-08-04 | End: 2019-08-04

## 2019-08-04 RX ORDER — ONDANSETRON 2 MG/ML
4 INJECTION INTRAMUSCULAR; INTRAVENOUS ONCE
Status: COMPLETED | OUTPATIENT
Start: 2019-08-04 | End: 2019-08-04

## 2019-08-04 RX ORDER — HYDROCORTISONE ACETATE 0.5 %
1 CREAM (GRAM) TOPICAL DAILY
Status: DISCONTINUED | OUTPATIENT
Start: 2019-08-04 | End: 2019-08-05 | Stop reason: HOSPADM

## 2019-08-04 RX ORDER — ALBUTEROL SULFATE 90 UG/1
2 AEROSOL, METERED RESPIRATORY (INHALATION) EVERY 6 HOURS PRN
Status: DISCONTINUED | OUTPATIENT
Start: 2019-08-04 | End: 2019-08-05 | Stop reason: HOSPADM

## 2019-08-04 RX ORDER — SODIUM CHLORIDE 9 MG/ML
INJECTION, SOLUTION INTRAVENOUS CONTINUOUS
Status: DISCONTINUED | OUTPATIENT
Start: 2019-08-04 | End: 2019-08-05 | Stop reason: HOSPADM

## 2019-08-04 RX ORDER — SODIUM CHLORIDE 0.9 % (FLUSH) 0.9 %
10 SYRINGE (ML) INJECTION EVERY 12 HOURS SCHEDULED
Status: DISCONTINUED | OUTPATIENT
Start: 2019-08-04 | End: 2019-08-05 | Stop reason: HOSPADM

## 2019-08-04 RX ADMIN — SODIUM CHLORIDE: 9 INJECTION, SOLUTION INTRAVENOUS at 15:29

## 2019-08-04 RX ADMIN — TAMSULOSIN HYDROCHLORIDE 0.4 MG: 0.4 CAPSULE ORAL at 11:44

## 2019-08-04 RX ADMIN — HYDROCODONE BITARTRATE AND ACETAMINOPHEN 2 TABLET: 5; 325 TABLET ORAL at 12:24

## 2019-08-04 RX ADMIN — SODIUM CHLORIDE: 9 INJECTION, SOLUTION INTRAVENOUS at 10:00

## 2019-08-04 RX ADMIN — MORPHINE SULFATE 2 MG: 2 INJECTION, SOLUTION INTRAMUSCULAR; INTRAVENOUS at 05:09

## 2019-08-04 RX ADMIN — FAMOTIDINE 20 MG: 20 TABLET, FILM COATED ORAL at 20:33

## 2019-08-04 RX ADMIN — ATORVASTATIN CALCIUM 20 MG: 10 TABLET, FILM COATED ORAL at 11:43

## 2019-08-04 RX ADMIN — CEFTRIAXONE 1 G: 1 INJECTION, POWDER, FOR SOLUTION INTRAMUSCULAR; INTRAVENOUS at 06:41

## 2019-08-04 RX ADMIN — KETOROLAC TROMETHAMINE 15 MG: 30 INJECTION, SOLUTION INTRAMUSCULAR at 06:41

## 2019-08-04 RX ADMIN — SODIUM CHLORIDE 1000 ML: 9 INJECTION, SOLUTION INTRAVENOUS at 05:09

## 2019-08-04 RX ADMIN — ONDANSETRON 4 MG: 2 INJECTION INTRAMUSCULAR; INTRAVENOUS at 05:09

## 2019-08-04 RX ADMIN — HYDROCODONE BITARTRATE AND ACETAMINOPHEN 2 TABLET: 5; 325 TABLET ORAL at 22:53

## 2019-08-04 RX ADMIN — OXYCODONE HYDROCHLORIDE AND ACETAMINOPHEN 500 MG: 500 TABLET ORAL at 11:43

## 2019-08-04 RX ADMIN — HYDROCODONE BITARTRATE AND ACETAMINOPHEN 2 TABLET: 5; 325 TABLET ORAL at 18:12

## 2019-08-04 RX ADMIN — ENOXAPARIN SODIUM 40 MG: 40 INJECTION SUBCUTANEOUS at 12:52

## 2019-08-04 RX ADMIN — FAMOTIDINE 20 MG: 20 TABLET, FILM COATED ORAL at 11:43

## 2019-08-04 RX ADMIN — SODIUM CHLORIDE: 9 INJECTION, SOLUTION INTRAVENOUS at 22:45

## 2019-08-04 ASSESSMENT — PAIN DESCRIPTION - DIRECTION
RADIATING_TOWARDS: L FLANK
RADIATING_TOWARDS: L FLANK

## 2019-08-04 ASSESSMENT — PAIN DESCRIPTION - ONSET
ONSET: ON-GOING

## 2019-08-04 ASSESSMENT — PAIN SCALES - GENERAL
PAINLEVEL_OUTOF10: 3
PAINLEVEL_OUTOF10: 3
PAINLEVEL_OUTOF10: 6
PAINLEVEL_OUTOF10: 3
PAINLEVEL_OUTOF10: 7
PAINLEVEL_OUTOF10: 4
PAINLEVEL_OUTOF10: 7
PAINLEVEL_OUTOF10: 7
PAINLEVEL_OUTOF10: 3
PAINLEVEL_OUTOF10: 3
PAINLEVEL_OUTOF10: 4
PAINLEVEL_OUTOF10: 5

## 2019-08-04 ASSESSMENT — PAIN DESCRIPTION - PROGRESSION
CLINICAL_PROGRESSION: GRADUALLY IMPROVING
CLINICAL_PROGRESSION: GRADUALLY IMPROVING

## 2019-08-04 ASSESSMENT — PAIN DESCRIPTION - LOCATION
LOCATION: ABDOMEN
LOCATION: ABDOMEN;FLANK
LOCATION: BACK;FLANK
LOCATION: ABDOMEN;FLANK

## 2019-08-04 ASSESSMENT — PAIN DESCRIPTION - PAIN TYPE
TYPE: ACUTE PAIN

## 2019-08-04 ASSESSMENT — PAIN DESCRIPTION - FREQUENCY
FREQUENCY: CONTINUOUS

## 2019-08-04 ASSESSMENT — PAIN DESCRIPTION - ORIENTATION
ORIENTATION: RIGHT
ORIENTATION: LEFT;LOWER
ORIENTATION: LEFT
ORIENTATION: LEFT;LOWER
ORIENTATION: LEFT;LOWER
ORIENTATION: LEFT

## 2019-08-04 ASSESSMENT — PAIN DESCRIPTION - DESCRIPTORS
DESCRIPTORS: SHARP
DESCRIPTORS: RADIATING;SHARP
DESCRIPTORS: SHARP
DESCRIPTORS: SHARP

## 2019-08-04 ASSESSMENT — ENCOUNTER SYMPTOMS
NAUSEA: 1
ABDOMINAL PAIN: 1

## 2019-08-05 ENCOUNTER — APPOINTMENT (OUTPATIENT)
Dept: GENERAL RADIOLOGY | Age: 59
DRG: 463 | End: 2019-08-05
Payer: MEDICAID

## 2019-08-05 ENCOUNTER — ANESTHESIA (OUTPATIENT)
Dept: OPERATING ROOM | Age: 59
DRG: 463 | End: 2019-08-05
Payer: MEDICAID

## 2019-08-05 ENCOUNTER — ANESTHESIA EVENT (OUTPATIENT)
Dept: OPERATING ROOM | Age: 59
DRG: 463 | End: 2019-08-05
Payer: MEDICAID

## 2019-08-05 VITALS
OXYGEN SATURATION: 93 % | RESPIRATION RATE: 11 BRPM | SYSTOLIC BLOOD PRESSURE: 86 MMHG | DIASTOLIC BLOOD PRESSURE: 56 MMHG

## 2019-08-05 VITALS
SYSTOLIC BLOOD PRESSURE: 124 MMHG | RESPIRATION RATE: 16 BRPM | HEIGHT: 64 IN | WEIGHT: 152.8 LBS | HEART RATE: 56 BPM | DIASTOLIC BLOOD PRESSURE: 66 MMHG | BODY MASS INDEX: 26.09 KG/M2 | TEMPERATURE: 96.4 F | OXYGEN SATURATION: 94 %

## 2019-08-05 LAB
ANION GAP SERPL CALCULATED.3IONS-SCNC: 6 MMOL/L (ref 9–17)
BUN BLDV-MCNC: 26 MG/DL (ref 6–20)
BUN/CREAT BLD: 26 (ref 9–20)
CALCIUM SERPL-MCNC: 8.2 MG/DL (ref 8.6–10.4)
CHLORIDE BLD-SCNC: 112 MMOL/L (ref 98–107)
CO2: 26 MMOL/L (ref 20–31)
CREAT SERPL-MCNC: 0.99 MG/DL (ref 0.5–0.9)
CULTURE: ABNORMAL
GFR AFRICAN AMERICAN: >60 ML/MIN
GFR NON-AFRICAN AMERICAN: 58 ML/MIN
GFR SERPL CREATININE-BSD FRML MDRD: ABNORMAL ML/MIN/{1.73_M2}
GFR SERPL CREATININE-BSD FRML MDRD: ABNORMAL ML/MIN/{1.73_M2}
GLUCOSE BLD-MCNC: 116 MG/DL (ref 70–99)
HCT VFR BLD CALC: 33.5 % (ref 36–46)
HEMOGLOBIN: 11.2 G/DL (ref 12–16)
Lab: ABNORMAL
MCH RBC QN AUTO: 31.6 PG (ref 26–34)
MCHC RBC AUTO-ENTMCNC: 33.3 G/DL (ref 31–37)
MCV RBC AUTO: 94.8 FL (ref 80–100)
NRBC AUTOMATED: ABNORMAL PER 100 WBC
PDW BLD-RTO: 15.1 % (ref 11–14.5)
PLATELET # BLD: 151 K/UL (ref 140–450)
PMV BLD AUTO: 8.7 FL (ref 6–12)
POTASSIUM SERPL-SCNC: 3.9 MMOL/L (ref 3.7–5.3)
RBC # BLD: 3.53 M/UL (ref 4–5.2)
SODIUM BLD-SCNC: 144 MMOL/L (ref 135–144)
SPECIMEN DESCRIPTION: ABNORMAL
WBC # BLD: 10.3 K/UL (ref 3.5–11)

## 2019-08-05 PROCEDURE — 6370000000 HC RX 637 (ALT 250 FOR IP): Performed by: NURSE PRACTITIONER

## 2019-08-05 PROCEDURE — 74018 RADEX ABDOMEN 1 VIEW: CPT

## 2019-08-05 PROCEDURE — 2580000003 HC RX 258: Performed by: NURSE PRACTITIONER

## 2019-08-05 PROCEDURE — 99238 HOSP IP/OBS DSCHRG MGMT 30/<: CPT | Performed by: INTERNAL MEDICINE

## 2019-08-05 PROCEDURE — C1769 GUIDE WIRE: HCPCS | Performed by: UROLOGY

## 2019-08-05 PROCEDURE — 7100000011 HC PHASE II RECOVERY - ADDTL 15 MIN: Performed by: UROLOGY

## 2019-08-05 PROCEDURE — 3700000001 HC ADD 15 MINUTES (ANESTHESIA): Performed by: UROLOGY

## 2019-08-05 PROCEDURE — 3600000012 HC SURGERY LEVEL 2 ADDTL 15MIN: Performed by: UROLOGY

## 2019-08-05 PROCEDURE — G0378 HOSPITAL OBSERVATION PER HR: HCPCS

## 2019-08-05 PROCEDURE — 7100000010 HC PHASE II RECOVERY - FIRST 15 MIN: Performed by: UROLOGY

## 2019-08-05 PROCEDURE — 3600000002 HC SURGERY LEVEL 2 BASE: Performed by: UROLOGY

## 2019-08-05 PROCEDURE — 6360000002 HC RX W HCPCS: Performed by: NURSE ANESTHETIST, CERTIFIED REGISTERED

## 2019-08-05 PROCEDURE — 80048 BASIC METABOLIC PNL TOTAL CA: CPT

## 2019-08-05 PROCEDURE — 2580000003 HC RX 258: Performed by: NURSE ANESTHETIST, CERTIFIED REGISTERED

## 2019-08-05 PROCEDURE — 85027 COMPLETE CBC AUTOMATED: CPT

## 2019-08-05 PROCEDURE — 6360000002 HC RX W HCPCS: Performed by: NURSE PRACTITIONER

## 2019-08-05 PROCEDURE — 3209999900 FLUORO FOR SURGICAL PROCEDURES

## 2019-08-05 PROCEDURE — 0T778DZ DILATION OF LEFT URETER WITH INTRALUMINAL DEVICE, VIA NATURAL OR ARTIFICIAL OPENING ENDOSCOPIC: ICD-10-PCS | Performed by: FAMILY MEDICINE

## 2019-08-05 PROCEDURE — 2500000003 HC RX 250 WO HCPCS: Performed by: NURSE ANESTHETIST, CERTIFIED REGISTERED

## 2019-08-05 PROCEDURE — 2709999900 HC NON-CHARGEABLE SUPPLY: Performed by: UROLOGY

## 2019-08-05 PROCEDURE — 6370000000 HC RX 637 (ALT 250 FOR IP): Performed by: UROLOGY

## 2019-08-05 PROCEDURE — 3700000000 HC ANESTHESIA ATTENDED CARE: Performed by: UROLOGY

## 2019-08-05 PROCEDURE — C2617 STENT, NON-COR, TEM W/O DEL: HCPCS | Performed by: UROLOGY

## 2019-08-05 PROCEDURE — 36415 COLL VENOUS BLD VENIPUNCTURE: CPT

## 2019-08-05 DEVICE — STENT URET 6FR L26CM PERCFLX HYDR+ TAPR TIP GRAD: Type: IMPLANTABLE DEVICE | Site: URETER | Status: NON-FUNCTIONAL

## 2019-08-05 RX ORDER — NICOTINE 21 MG/24HR
1 PATCH, TRANSDERMAL 24 HOURS TRANSDERMAL DAILY PRN
Qty: 30 PATCH | Refills: 0 | COMMUNITY
Start: 2019-08-05 | End: 2020-06-15

## 2019-08-05 RX ORDER — HYDROCODONE BITARTRATE AND ACETAMINOPHEN 5; 325 MG/1; MG/1
1 TABLET ORAL EVERY 4 HOURS PRN
Qty: 10 TABLET | Refills: 0 | Status: SHIPPED | OUTPATIENT
Start: 2019-08-05 | End: 2019-08-09 | Stop reason: SDUPTHER

## 2019-08-05 RX ORDER — TAMSULOSIN HYDROCHLORIDE 0.4 MG/1
0.4 CAPSULE ORAL DAILY
Qty: 30 CAPSULE | Refills: 0 | Status: SHIPPED | OUTPATIENT
Start: 2019-08-06 | End: 2019-10-21 | Stop reason: ALTCHOICE

## 2019-08-05 RX ORDER — NICOTINE 21 MG/24HR
1 PATCH, TRANSDERMAL 24 HOURS TRANSDERMAL DAILY
Status: DISCONTINUED | OUTPATIENT
Start: 2019-08-05 | End: 2019-08-05 | Stop reason: HOSPADM

## 2019-08-05 RX ORDER — FENTANYL CITRATE 50 UG/ML
INJECTION, SOLUTION INTRAMUSCULAR; INTRAVENOUS PRN
Status: DISCONTINUED | OUTPATIENT
Start: 2019-08-05 | End: 2019-08-05 | Stop reason: SDUPTHER

## 2019-08-05 RX ORDER — LIDOCAINE HYDROCHLORIDE 20 MG/ML
INJECTION, SOLUTION EPIDURAL; INFILTRATION; INTRACAUDAL; PERINEURAL PRN
Status: DISCONTINUED | OUTPATIENT
Start: 2019-08-05 | End: 2019-08-05 | Stop reason: SDUPTHER

## 2019-08-05 RX ORDER — SODIUM CHLORIDE 9 MG/ML
INJECTION, SOLUTION INTRAVENOUS CONTINUOUS PRN
Status: DISCONTINUED | OUTPATIENT
Start: 2019-08-05 | End: 2019-08-05 | Stop reason: SDUPTHER

## 2019-08-05 RX ORDER — CEFDINIR 300 MG/1
300 CAPSULE ORAL 2 TIMES DAILY
Qty: 14 CAPSULE | Refills: 0 | Status: SHIPPED | OUTPATIENT
Start: 2019-08-05 | End: 2019-08-12

## 2019-08-05 RX ORDER — MELOXICAM 15 MG/1
15 TABLET ORAL DAILY PRN
Qty: 30 TABLET | Refills: 0
Start: 2019-08-05 | End: 2019-10-28 | Stop reason: SDUPTHER

## 2019-08-05 RX ORDER — MIDAZOLAM HYDROCHLORIDE 1 MG/ML
INJECTION INTRAMUSCULAR; INTRAVENOUS PRN
Status: DISCONTINUED | OUTPATIENT
Start: 2019-08-05 | End: 2019-08-05 | Stop reason: SDUPTHER

## 2019-08-05 RX ORDER — PROPOFOL 10 MG/ML
INJECTION, EMULSION INTRAVENOUS PRN
Status: DISCONTINUED | OUTPATIENT
Start: 2019-08-05 | End: 2019-08-05 | Stop reason: SDUPTHER

## 2019-08-05 RX ADMIN — CEFTRIAXONE 1 G: 1 INJECTION, POWDER, FOR SOLUTION INTRAMUSCULAR; INTRAVENOUS at 06:42

## 2019-08-05 RX ADMIN — PROPOFOL 50 MG: 10 INJECTION, EMULSION INTRAVENOUS at 11:50

## 2019-08-05 RX ADMIN — SODIUM CHLORIDE: 9 INJECTION, SOLUTION INTRAVENOUS at 05:32

## 2019-08-05 RX ADMIN — MIDAZOLAM HYDROCHLORIDE 2 MG: 1 INJECTION, SOLUTION INTRAMUSCULAR; INTRAVENOUS at 11:42

## 2019-08-05 RX ADMIN — HYDROCODONE BITARTRATE AND ACETAMINOPHEN 2 TABLET: 5; 325 TABLET ORAL at 09:33

## 2019-08-05 RX ADMIN — LIDOCAINE HYDROCHLORIDE 100 MG: 20 INJECTION, SOLUTION EPIDURAL; INFILTRATION; INTRACAUDAL; PERINEURAL at 11:45

## 2019-08-05 RX ADMIN — HYDROCODONE BITARTRATE AND ACETAMINOPHEN 2 TABLET: 5; 325 TABLET ORAL at 05:32

## 2019-08-05 RX ADMIN — PROPOFOL 50 MG: 10 INJECTION, EMULSION INTRAVENOUS at 11:45

## 2019-08-05 RX ADMIN — PROPOFOL 50 MG: 10 INJECTION, EMULSION INTRAVENOUS at 11:58

## 2019-08-05 RX ADMIN — FENTANYL CITRATE 25 MCG: 50 INJECTION, SOLUTION INTRAMUSCULAR; INTRAVENOUS at 11:51

## 2019-08-05 RX ADMIN — HYDROCODONE BITARTRATE AND ACETAMINOPHEN 2 TABLET: 5; 325 TABLET ORAL at 13:01

## 2019-08-05 RX ADMIN — FENTANYL CITRATE 25 MCG: 50 INJECTION, SOLUTION INTRAMUSCULAR; INTRAVENOUS at 11:45

## 2019-08-05 RX ADMIN — SODIUM CHLORIDE: 9 INJECTION, SOLUTION INTRAVENOUS at 11:40

## 2019-08-05 ASSESSMENT — LIFESTYLE VARIABLES: SMOKING_STATUS: 1

## 2019-08-05 ASSESSMENT — PAIN SCALES - GENERAL
PAINLEVEL_OUTOF10: 6
PAINLEVEL_OUTOF10: 7
PAINLEVEL_OUTOF10: 5
PAINLEVEL_OUTOF10: 8
PAINLEVEL_OUTOF10: 0
PAINLEVEL_OUTOF10: 5
PAINLEVEL_OUTOF10: 3

## 2019-08-05 ASSESSMENT — PAIN DESCRIPTION - ORIENTATION
ORIENTATION: LEFT
ORIENTATION: LEFT

## 2019-08-05 ASSESSMENT — PAIN DESCRIPTION - LOCATION
LOCATION: FLANK
LOCATION: FLANK

## 2019-08-05 NOTE — ANESTHESIA PRE PROCEDURE
Department of Anesthesiology  Preprocedure Note       Name:  Loree Dee   Age:  63 y.o.  :  1960                                          MRN:  [id number]         Date:  2019      Surgeon: Jennifer Saucedo):  Ki Magallon MD    Procedure: CYSTO Left Stent Insertion (Left )    Medications prior to admission:   Prior to Admission medications    Medication Sig Start Date End Date Taking? Authorizing Provider   vitamin D (ERGOCALCIFEROL) 82924 units CAPS capsule Take 1 capsule by mouth once a week 19  Yes AARTI Barrera CNM   atorvastatin (LIPITOR) 20 MG tablet Take 1 tablet by mouth daily 19  Yes [facility]   meloxicam (MOBIC) 15 MG tablet Take 1 tablet by mouth daily 19  Yes RENATO Mccauley   lisinopril (PRINIVIL;ZESTRIL) 10 MG tablet Take 1 tablet by mouth daily 19  Yes RENATO Mccauley   cyclobenzaprine (FLEXERIL) 10 MG tablet take 1 tablet by mouth three times a day if needed for muscle spasm 19  Yes Lupe Jimenez Alanisreenma   neomycin-polymyxin-hydrocortisone (CORTISPORIN) 3.5-98408-8 otic suspension 3 gtts BID to L ear 19  Yes Omari Gilmore MD   albuterol sulfate  (90 Base) MCG/ACT inhaler Inhale 2 puffs into the lungs every 6 hours as needed for Wheezing 10/16/18  Yes RENATO Mccauley   Glucosamine-Chondroit-Vit C-Mn (GLUCOSAMINE 1500 COMPLEX PO) Take by mouth daily   Yes Historical Provider, MD   CRANBERRY PO Take by mouth daily   Yes Historical Provider, MD   Multiple Vitamins-Minerals (HAIR/SKIN/NAILS PO) Take by mouth daily   Yes Historical Provider, MD   ascorbic acid (VITAMIN C) 500 MG tablet Take 500 mg by mouth daily.    Yes Historical Provider, MD   diclofenac (VOLTAREN) 50 MG EC tablet Take 1 tablet by mouth 3 times daily (with meals) 19   Mejia Mobley DPM   diclofenac (VOLTAREN) 50 MG EC tablet Take 1 tablet by mouth 3 times daily (with meals) 19   Mejia Mobley DPM   traZODone (DESYREL) 50 MG tablet Take 1 tablet by mouth nightly 10/16/18   RENATO Bates       Current medications:    Current Facility-Administered Medications   Medication Dose Route Frequency Provider Last Rate Last Dose    nicotine (NICODERM CQ) 21 MG/24HR 1 patch  1 patch Transdermal Daily Lewis Jay        levalbuterol (XOPENEX) nebulization 0.63 mg  0.63 mg Nebulization Once Guerline Pellant, APRN - CNP        albuterol sulfate  (90 Base) MCG/ACT inhaler 2 puff  2 puff Inhalation Q6H PRN Guerline Pellant, APRN - CNP        vitamin C (ASCORBIC ACID) tablet 500 mg  500 mg Oral Daily Guerline Pellant, APRN - CNP   500 mg at 08/04/19 1143    atorvastatin (LIPITOR) tablet 20 mg  20 mg Oral Daily Guerline Pellant, APRN - CNP   20 mg at 08/04/19 1143    GLUCOSAMINE 1500 COMPLEX CAPS 1 tablet  1 tablet Oral Daily Guerline Pellant, APRN - CNP        [START ON 8/10/2019] vitamin D (ERGOCALCIFEROL) capsule 50,000 Units  50,000 Units Oral Weekly Guerline Pellant, APRN - CNP        0.9 % sodium chloride infusion   Intravenous Continuous Guerline Pellant, APRN -  mL/hr at 08/05/19 0532      sodium chloride flush 0.9 % injection 10 mL  10 mL Intravenous 2 times per day Guerline Pellant, APRN - CNP        sodium chloride flush 0.9 % injection 10 mL  10 mL Intravenous PRN Guerline Pellant, APRN - CNP        acetaminophen (TYLENOL) tablet 650 mg  650 mg Oral Q4H PRN Guerline Pellant, APRN - CNP        HYDROcodone-acetaminophen (NORCO) 5-325 MG per tablet 1 tablet  1 tablet Oral Q4H PRN Guerline Pellant, APRN - CNP        Or    HYDROcodone-acetaminophen (NORCO) 5-325 MG per tablet 2 tablet  2 tablet Oral Q4H PRN Guerline Pellant, APRN - CNP   2 tablet at 08/05/19 0933    morphine (PF) injection 2 mg  2 mg Intravenous Q2H PRN Guerline Pellant, APRN - CNP        Or    morphine injection 4 mg  4 mg Intravenous Q2H PRN Guerline Pellant, APRN - CNP        magnesium hydroxide (MILK OF MAGNESIA) 400

## 2019-08-06 ENCOUNTER — TELEPHONE (OUTPATIENT)
Dept: FAMILY MEDICINE CLINIC | Age: 59
End: 2019-08-06

## 2019-08-06 NOTE — TELEPHONE ENCOUNTER
----- Message from Dixon Xiao sent at 8/6/2019  9:04 AM EDT -----  Was patient discharged on any antibiotics?

## 2019-08-09 ENCOUNTER — TELEPHONE (OUTPATIENT)
Dept: UROLOGY | Age: 59
End: 2019-08-09

## 2019-08-09 DIAGNOSIS — N13.2 HYDRONEPHROSIS WITH OBSTRUCTING CALCULUS: ICD-10-CM

## 2019-08-09 RX ORDER — HYDROCODONE BITARTRATE AND ACETAMINOPHEN 5; 325 MG/1; MG/1
1 TABLET ORAL EVERY 4 HOURS PRN
Qty: 10 TABLET | Refills: 0 | Status: SHIPPED | OUTPATIENT
Start: 2019-08-09 | End: 2019-08-12

## 2019-08-12 ENCOUNTER — OFFICE VISIT (OUTPATIENT)
Dept: UROLOGY | Age: 59
End: 2019-08-12
Payer: MEDICAID

## 2019-08-12 ENCOUNTER — HOSPITAL ENCOUNTER (OUTPATIENT)
Age: 59
Setting detail: SPECIMEN
Discharge: HOME | End: 2019-08-12
Payer: MEDICAID

## 2019-08-12 VITALS
HEART RATE: 66 BPM | WEIGHT: 141.8 LBS | SYSTOLIC BLOOD PRESSURE: 130 MMHG | BODY MASS INDEX: 24.21 KG/M2 | DIASTOLIC BLOOD PRESSURE: 88 MMHG | HEIGHT: 64 IN

## 2019-08-12 DIAGNOSIS — N20.1 LEFT URETERAL STONE: Primary | ICD-10-CM

## 2019-08-12 DIAGNOSIS — N20.1 LEFT URETERAL STONE: ICD-10-CM

## 2019-08-12 DIAGNOSIS — N30.01 ACUTE CYSTITIS WITH HEMATURIA: ICD-10-CM

## 2019-08-12 PROCEDURE — 4004F PT TOBACCO SCREEN RCVD TLK: CPT | Performed by: UROLOGY

## 2019-08-12 PROCEDURE — 1111F DSCHRG MED/CURRENT MED MERGE: CPT | Performed by: UROLOGY

## 2019-08-12 PROCEDURE — G8420 CALC BMI NORM PARAMETERS: HCPCS | Performed by: UROLOGY

## 2019-08-12 PROCEDURE — G8427 DOCREV CUR MEDS BY ELIG CLIN: HCPCS | Performed by: UROLOGY

## 2019-08-12 PROCEDURE — G8599 NO ASA/ANTIPLAT THER USE RNG: HCPCS | Performed by: UROLOGY

## 2019-08-12 PROCEDURE — 99215 OFFICE O/P EST HI 40 MIN: CPT | Performed by: UROLOGY

## 2019-08-12 PROCEDURE — 3017F COLORECTAL CA SCREEN DOC REV: CPT | Performed by: UROLOGY

## 2019-08-12 PROCEDURE — 87086 URINE CULTURE/COLONY COUNT: CPT

## 2019-08-12 NOTE — PROGRESS NOTES
REVIEW OF SYSTEMS:  Constitutional: negative  Eyes: negative  Respiratory: negative  Cardiovascular: negative  Gastrointestinal: negative  Genitourinary: see HPI  Musculoskeletal: negative  Skin: negative   Neurological: negative  Hematological/Lymphatic: negative  Psychological: negative        Physical Exam:    This a 63 y.o. female  Vitals:    08/12/19 1154   BP: 130/88   Pulse: 66     Body mass index is 24.33 kg/m². Constitutional: Patient in no acute distress;         Assessment and Plan        1. Left ureteral stone    2. Acute cystitis with hematuria               Plan:        Urine culture today  Just finished antibiotics  Cystoscopy, left stent removal, Left ESWL next Monday in [place]      Prescriptions Ordered:  No orders of the defined types were placed in this encounter. Orders Placed:  Orders Placed This Encounter   Procedures    Urine Culture     Standing Status:   Future     Standing Expiration Date:   8/12/2020     Order Specific Question:   Specify (ex-cath, midstream, cysto, etc)?      Answer:   midstream            Derek Najjar, MD

## 2019-08-13 LAB
CULTURE: NO GROWTH
Lab: NORMAL
SPECIMEN DESCRIPTION: NORMAL

## 2019-08-19 ENCOUNTER — ANESTHESIA (OUTPATIENT)
Dept: OPERATING ROOM | Age: 59
End: 2019-08-19
Payer: MEDICAID

## 2019-08-19 ENCOUNTER — ANESTHESIA EVENT (OUTPATIENT)
Dept: OPERATING ROOM | Age: 59
End: 2019-08-19
Payer: MEDICAID

## 2019-08-19 ENCOUNTER — HOSPITAL ENCOUNTER (OUTPATIENT)
Age: 59
Setting detail: OUTPATIENT SURGERY
Discharge: HOME | End: 2019-08-19
Attending: UROLOGY | Admitting: UROLOGY
Payer: MEDICAID

## 2019-08-19 ENCOUNTER — APPOINTMENT (OUTPATIENT)
Dept: GENERAL RADIOLOGY | Age: 59
End: 2019-08-19
Attending: UROLOGY
Payer: MEDICAID

## 2019-08-19 VITALS
OXYGEN SATURATION: 99 % | SYSTOLIC BLOOD PRESSURE: 98 MMHG | RESPIRATION RATE: 8 BRPM | DIASTOLIC BLOOD PRESSURE: 56 MMHG | TEMPERATURE: 92 F

## 2019-08-19 VITALS
BODY MASS INDEX: 23.46 KG/M2 | TEMPERATURE: 97 F | HEART RATE: 69 BPM | RESPIRATION RATE: 16 BRPM | WEIGHT: 137.4 LBS | OXYGEN SATURATION: 98 % | SYSTOLIC BLOOD PRESSURE: 115 MMHG | DIASTOLIC BLOOD PRESSURE: 66 MMHG | HEIGHT: 64 IN

## 2019-08-19 DIAGNOSIS — G89.18 POSTOPERATIVE PAIN: Primary | ICD-10-CM

## 2019-08-19 PROCEDURE — 3600000013 HC SURGERY LEVEL 3 ADDTL 15MIN: Performed by: UROLOGY

## 2019-08-19 PROCEDURE — 7100000000 HC PACU RECOVERY - FIRST 15 MIN: Performed by: UROLOGY

## 2019-08-19 PROCEDURE — 7100000011 HC PHASE II RECOVERY - ADDTL 15 MIN: Performed by: UROLOGY

## 2019-08-19 PROCEDURE — 3600000003 HC SURGERY LEVEL 3 BASE: Performed by: UROLOGY

## 2019-08-19 PROCEDURE — 6360000002 HC RX W HCPCS: Performed by: UROLOGY

## 2019-08-19 PROCEDURE — 6360000002 HC RX W HCPCS: Performed by: NURSE ANESTHETIST, CERTIFIED REGISTERED

## 2019-08-19 PROCEDURE — 2709999900 HC NON-CHARGEABLE SUPPLY: Performed by: UROLOGY

## 2019-08-19 PROCEDURE — 74018 RADEX ABDOMEN 1 VIEW: CPT

## 2019-08-19 PROCEDURE — 2580000003 HC RX 258: Performed by: UROLOGY

## 2019-08-19 PROCEDURE — 3700000001 HC ADD 15 MINUTES (ANESTHESIA): Performed by: UROLOGY

## 2019-08-19 PROCEDURE — 7100000010 HC PHASE II RECOVERY - FIRST 15 MIN: Performed by: UROLOGY

## 2019-08-19 PROCEDURE — 2500000003 HC RX 250 WO HCPCS: Performed by: NURSE ANESTHETIST, CERTIFIED REGISTERED

## 2019-08-19 PROCEDURE — 3700000000 HC ANESTHESIA ATTENDED CARE: Performed by: UROLOGY

## 2019-08-19 RX ORDER — DEXAMETHASONE SODIUM PHOSPHATE 10 MG/ML
INJECTION INTRAMUSCULAR; INTRAVENOUS PRN
Status: DISCONTINUED | OUTPATIENT
Start: 2019-08-19 | End: 2019-08-19 | Stop reason: SDUPTHER

## 2019-08-19 RX ORDER — ONDANSETRON 2 MG/ML
INJECTION INTRAMUSCULAR; INTRAVENOUS PRN
Status: DISCONTINUED | OUTPATIENT
Start: 2019-08-19 | End: 2019-08-19 | Stop reason: SDUPTHER

## 2019-08-19 RX ORDER — EPHEDRINE SULFATE 50 MG/ML
INJECTION INTRAVENOUS PRN
Status: DISCONTINUED | OUTPATIENT
Start: 2019-08-19 | End: 2019-08-19 | Stop reason: SDUPTHER

## 2019-08-19 RX ORDER — MIDAZOLAM HYDROCHLORIDE 1 MG/ML
INJECTION INTRAMUSCULAR; INTRAVENOUS PRN
Status: DISCONTINUED | OUTPATIENT
Start: 2019-08-19 | End: 2019-08-19 | Stop reason: SDUPTHER

## 2019-08-19 RX ORDER — HYDROCODONE BITARTRATE AND ACETAMINOPHEN 5; 325 MG/1; MG/1
1 TABLET ORAL EVERY 4 HOURS PRN
Qty: 18 TABLET | Refills: 0 | Status: SHIPPED | OUTPATIENT
Start: 2019-08-19 | End: 2019-08-22

## 2019-08-19 RX ORDER — FUROSEMIDE 10 MG/ML
INJECTION INTRAMUSCULAR; INTRAVENOUS PRN
Status: DISCONTINUED | OUTPATIENT
Start: 2019-08-19 | End: 2019-08-19 | Stop reason: SDUPTHER

## 2019-08-19 RX ORDER — SODIUM CHLORIDE 0.9 % (FLUSH) 0.9 %
10 SYRINGE (ML) INJECTION PRN
Status: DISCONTINUED | OUTPATIENT
Start: 2019-08-19 | End: 2019-08-19 | Stop reason: HOSPADM

## 2019-08-19 RX ORDER — PROPOFOL 10 MG/ML
INJECTION, EMULSION INTRAVENOUS PRN
Status: DISCONTINUED | OUTPATIENT
Start: 2019-08-19 | End: 2019-08-19 | Stop reason: SDUPTHER

## 2019-08-19 RX ORDER — SODIUM CHLORIDE, SODIUM LACTATE, POTASSIUM CHLORIDE, CALCIUM CHLORIDE 600; 310; 30; 20 MG/100ML; MG/100ML; MG/100ML; MG/100ML
INJECTION, SOLUTION INTRAVENOUS CONTINUOUS
Status: DISCONTINUED | OUTPATIENT
Start: 2019-08-19 | End: 2019-08-19 | Stop reason: HOSPADM

## 2019-08-19 RX ORDER — FENTANYL CITRATE 50 UG/ML
INJECTION, SOLUTION INTRAMUSCULAR; INTRAVENOUS PRN
Status: DISCONTINUED | OUTPATIENT
Start: 2019-08-19 | End: 2019-08-19 | Stop reason: SDUPTHER

## 2019-08-19 RX ORDER — PHENYLEPHRINE HYDROCHLORIDE 10 MG/ML
INJECTION INTRAVENOUS PRN
Status: DISCONTINUED | OUTPATIENT
Start: 2019-08-19 | End: 2019-08-19 | Stop reason: SDUPTHER

## 2019-08-19 RX ORDER — LIDOCAINE HYDROCHLORIDE 20 MG/ML
INJECTION, SOLUTION EPIDURAL; INFILTRATION; INTRACAUDAL; PERINEURAL PRN
Status: DISCONTINUED | OUTPATIENT
Start: 2019-08-19 | End: 2019-08-19 | Stop reason: SDUPTHER

## 2019-08-19 RX ORDER — SODIUM CHLORIDE 0.9 % (FLUSH) 0.9 %
10 SYRINGE (ML) INJECTION EVERY 12 HOURS SCHEDULED
Status: DISCONTINUED | OUTPATIENT
Start: 2019-08-19 | End: 2019-08-19 | Stop reason: HOSPADM

## 2019-08-19 RX ADMIN — PHENYLEPHRINE HYDROCHLORIDE 500 MCG: 10 INJECTION INTRAVENOUS at 14:23

## 2019-08-19 RX ADMIN — PHENYLEPHRINE HYDROCHLORIDE 500 MCG: 10 INJECTION INTRAVENOUS at 14:44

## 2019-08-19 RX ADMIN — FENTANYL CITRATE 100 MCG: 50 INJECTION, SOLUTION INTRAMUSCULAR; INTRAVENOUS at 14:01

## 2019-08-19 RX ADMIN — SODIUM CHLORIDE, POTASSIUM CHLORIDE, SODIUM LACTATE AND CALCIUM CHLORIDE: 600; 310; 30; 20 INJECTION, SOLUTION INTRAVENOUS at 13:28

## 2019-08-19 RX ADMIN — PROPOFOL 100 MG: 10 INJECTION, EMULSION INTRAVENOUS at 14:11

## 2019-08-19 RX ADMIN — EPHEDRINE SULFATE 30 MG: 50 INJECTION INTRAVENOUS at 14:18

## 2019-08-19 RX ADMIN — EPHEDRINE SULFATE 20 MG: 50 INJECTION INTRAVENOUS at 14:19

## 2019-08-19 RX ADMIN — LIDOCAINE HYDROCHLORIDE 60 MG: 20 INJECTION, SOLUTION EPIDURAL; INFILTRATION; INTRACAUDAL; PERINEURAL at 14:11

## 2019-08-19 RX ADMIN — FUROSEMIDE 10 MG: 10 INJECTION, SOLUTION INTRAMUSCULAR; INTRAVENOUS at 14:28

## 2019-08-19 RX ADMIN — ONDANSETRON 4 MG: 2 INJECTION INTRAMUSCULAR; INTRAVENOUS at 14:25

## 2019-08-19 RX ADMIN — Medication 2 G: at 14:10

## 2019-08-19 RX ADMIN — SODIUM CHLORIDE, POTASSIUM CHLORIDE, SODIUM LACTATE AND CALCIUM CHLORIDE: 600; 310; 30; 20 INJECTION, SOLUTION INTRAVENOUS at 14:29

## 2019-08-19 RX ADMIN — MIDAZOLAM HYDROCHLORIDE 2 MG: 1 INJECTION, SOLUTION INTRAMUSCULAR; INTRAVENOUS at 14:01

## 2019-08-19 RX ADMIN — DEXAMETHASONE SODIUM PHOSPHATE 10 MG: 10 INJECTION INTRAMUSCULAR; INTRAVENOUS at 14:25

## 2019-08-19 RX ADMIN — PHENYLEPHRINE HYDROCHLORIDE 500 MCG: 10 INJECTION INTRAVENOUS at 15:00

## 2019-08-19 ASSESSMENT — PULMONARY FUNCTION TESTS
PIF_VALUE: 11
PIF_VALUE: 31
PIF_VALUE: 11
PIF_VALUE: 12
PIF_VALUE: 11
PIF_VALUE: 29
PIF_VALUE: 11
PIF_VALUE: 29
PIF_VALUE: 11
PIF_VALUE: 11
PIF_VALUE: 9
PIF_VALUE: 11
PIF_VALUE: 8
PIF_VALUE: 10
PIF_VALUE: 11
PIF_VALUE: 11
PIF_VALUE: 10
PIF_VALUE: 1
PIF_VALUE: 12
PIF_VALUE: 12
PIF_VALUE: 11
PIF_VALUE: 11
PIF_VALUE: 10
PIF_VALUE: 11
PIF_VALUE: 11
PIF_VALUE: 1
PIF_VALUE: 11
PIF_VALUE: 1
PIF_VALUE: 11
PIF_VALUE: 10
PIF_VALUE: 11
PIF_VALUE: 15
PIF_VALUE: 11
PIF_VALUE: 9
PIF_VALUE: 12
PIF_VALUE: 11
PIF_VALUE: 15
PIF_VALUE: 10
PIF_VALUE: 11
PIF_VALUE: 11

## 2019-08-19 ASSESSMENT — PAIN DESCRIPTION - LOCATION: LOCATION: FLANK;BACK

## 2019-08-19 ASSESSMENT — PAIN SCALES - GENERAL
PAINLEVEL_OUTOF10: 8

## 2019-08-19 ASSESSMENT — PAIN - FUNCTIONAL ASSESSMENT: PAIN_FUNCTIONAL_ASSESSMENT: 0-10

## 2019-08-19 ASSESSMENT — PAIN DESCRIPTION - FREQUENCY: FREQUENCY: CONTINUOUS

## 2019-08-19 ASSESSMENT — PAIN DESCRIPTION - PAIN TYPE: TYPE: ACUTE PAIN

## 2019-08-19 ASSESSMENT — PAIN DESCRIPTION - DESCRIPTORS: DESCRIPTORS: SHARP

## 2019-08-19 ASSESSMENT — LIFESTYLE VARIABLES: SMOKING_STATUS: 1

## 2019-08-19 NOTE — ANESTHESIA PRE PROCEDURE
input(s): POCGLU, POCNA, POCK, POCCL, POCBUN, POCHEMO, POCHCT in the last 72 hours. Coags: No results found for: PROTIME, INR, APTT    HCG (If Applicable): No results found for: PREGTESTUR, PREGSERUM, HCG, HCGQUANT     ABGs: No results found for: PHART, PO2ART, SFM8FOK, MJB0SAA, BEART, P0UASECV     Type & Screen (If Applicable):  No results found for: LABABO, [address]    Anesthesia Evaluation  Patient summary reviewed and Nursing notes reviewed no history of anesthetic complications:   Airway: Mallampati: II  TM distance: >3 FB   Neck ROM: full  Mouth opening: > = 3 FB Dental:    (+) lower dentures and upper dentures      Pulmonary:normal exam  breath sounds clear to auscultation  (+) COPD:  current smoker                           Cardiovascular:  Exercise tolerance: good (>4 METS),   (+) hypertension: mild, CAD: non-obstructive, hyperlipidemia        Rhythm: regular  Rate: normal           Beta Blocker:  Not on Beta Blocker         Neuro/Psych:   Negative Neuro/Psych ROS              GI/Hepatic/Renal: Neg GI/Hepatic/Renal ROS            Endo/Other: Negative Endo/Other ROS             Pt had no PAT visit       Abdominal:       Abdomen: soft. Vascular: negative vascular ROS. Anesthesia Plan      general     ASA 3       Induction: intravenous. MIPS: Postoperative opioids intended and Prophylactic antiemetics administered. Anesthetic plan and risks discussed with patient.       Plan discussed with surgical team.                  AARTI Torres - CRNA   8/19/2019

## 2019-08-19 NOTE — H&P
(VITAMIN C) 500 MG tablet Take 500 mg by mouth daily. Yes Historical Provider, MD   nicotine (NICODERM CQ) 21 MG/24HR Place 1 patch onto the skin daily as needed (as needed if a smoker) 8/5/19   Macrina Bernard       Allergies:  Patient has no known allergies. Social History:    Social History     Socioeconomic History    Marital status:      Spouse name: Not on file    Number of children: Not on file    Years of education: Not on file    Highest education level: Not on file   Occupational History    Not on file   Social Needs    Financial resource strain: Not on file    Food insecurity:     Worry: Not on file     Inability: Not on file    Transportation needs:     Medical: Not on file     Non-medical: Not on file   Tobacco Use    Smoking status: Current Every Day Smoker     Packs/day: 0.50     Years: 15.00     Pack years: 7.50    Smokeless tobacco: Never Used    Tobacco comment: Ary Hernandez RRT 8/4/19   Substance and Sexual Activity    Alcohol use: Yes     Alcohol/week: 0.0 standard drinks     Comment: occasional    Drug use: No    Sexual activity: Yes     Partners: Male   Lifestyle    Physical activity:     Days per week: Not on file     Minutes per session: Not on file    Stress: Not on file   Relationships    Social connections:     Talks on phone: Not on file     Gets together: Not on file     Attends [employer] service: Not on file     Active member of club or organization: Not on file     Attends meetings of clubs or organizations: Not on file     Relationship status: Not on file    Intimate partner violence:     Fear of current or ex partner: Not on file     Emotionally abused: Not on file     Physically abused: Not on file     Forced sexual activity: Not on file   Other Topics Concern    Not on file   Social History Narrative    Not on file       Family History:    Family History   Problem Relation Age of Onset    Diabetes Mother     Other Mother         (history of polyps).    

## 2019-08-22 DIAGNOSIS — N20.1 LEFT URETERAL STONE: Primary | ICD-10-CM

## 2019-09-10 ENCOUNTER — OFFICE VISIT (OUTPATIENT)
Dept: OTOLARYNGOLOGY | Age: 59
End: 2019-09-10
Payer: MEDICAID

## 2019-09-10 VITALS
BODY MASS INDEX: 23.45 KG/M2 | HEIGHT: 64 IN | DIASTOLIC BLOOD PRESSURE: 84 MMHG | HEART RATE: 80 BPM | TEMPERATURE: 97.5 F | SYSTOLIC BLOOD PRESSURE: 120 MMHG | WEIGHT: 137.35 LBS

## 2019-09-10 DIAGNOSIS — H66.90 CHRONIC OTITIS MEDIA, UNSPECIFIED OTITIS MEDIA TYPE: Primary | ICD-10-CM

## 2019-09-10 PROCEDURE — 4004F PT TOBACCO SCREEN RCVD TLK: CPT | Performed by: OTOLARYNGOLOGY

## 2019-09-10 PROCEDURE — 3017F COLORECTAL CA SCREEN DOC REV: CPT | Performed by: OTOLARYNGOLOGY

## 2019-09-10 PROCEDURE — G8420 CALC BMI NORM PARAMETERS: HCPCS | Performed by: OTOLARYNGOLOGY

## 2019-09-10 PROCEDURE — G8599 NO ASA/ANTIPLAT THER USE RNG: HCPCS | Performed by: OTOLARYNGOLOGY

## 2019-09-10 PROCEDURE — 99213 OFFICE O/P EST LOW 20 MIN: CPT | Performed by: OTOLARYNGOLOGY

## 2019-09-10 PROCEDURE — G8427 DOCREV CUR MEDS BY ELIG CLIN: HCPCS | Performed by: OTOLARYNGOLOGY

## 2019-09-10 NOTE — PROGRESS NOTES
Terrance GIRON Rosaura  9/10/19  /84 (Site: Left Upper Arm, Position: Sitting, Cuff Size: Medium Adult)   Pulse 80   Temp 97.5 °F (36.4 °C) (Tympanic)   Ht 5' 4.02\" (1.626 m)   Wt 137 lb 5.6 oz (62.3 kg)   BMI 23.56 kg/m²   Allergies as of 09/10/2019    (No Known Allergies)     Chief Complaint   Patient presents with    Follow-up     3 month f/u chronic otitis media, \"left ear still fells jmimy plugged\"       HPI:  fu for retraction pocket AS, has had mult AU surg, AD PORP/TORP, on CTS gtts, has HAs    Past Med Hx:     Past Medical History:   Diagnosis Date    COPD (chronic obstructive pulmonary disease) ([facility] [address]     Hemorrhoids     History of herpes zoster 2002    (right lower rib area laterally and anteriorly).  History of thyroiditis 1992    History of ulcerative colitis     (of 20 years disease - though quiescent).  Hypercholesterolemia     Hypertension     Neuritis     Shortened VT interval     (on EKG from 2004).  Tobacco abuse     Venous insufficiency, peripheral     (bilateral). Past Surgical History:   Procedure Laterality Date    APPENDECTOMY  1982    (Dr. Thor Berumen) - Acute appendicitis.  CARDIAC CATHETERIZATION  2000    Preserved left ventricular systolic function. Ejection fraction 50%. Noncritical coronary disease. (Dr. Yesenia Garcia).  CHOLECYSTECTOMY  2001    COLONOSCOPY  1988    (Dr. Pedro Luis Deutsch) - Normal colon.  COLONOSCOPY  5/12/16    Polyp distal sigmoid, rectum - Dr Rdz Hong Konger Left 8/5/2019    CYSTO Left Stent Insertion performed by Morro Rod MD at [facility]  2010    Incision and drainage of thrombosed hemorrhoid.  HYSTERECTOMY  2001    (no BSO).  LITHOTRIPSY Left 8/19/2019    CYSTO W/ STENT REMOVAL ET Left LITHOTRIPSY performed by Morro Rod MD at [facility] surgery x8.    OTHER SURGICAL HISTORY  1987    Pelvic exam under anesthesia.  (Dr. Kerry Mojica) - Chronic pelvic pain, chronic inflammatory

## 2019-09-13 ENCOUNTER — TELEPHONE (OUTPATIENT)
Dept: OBGYN | Age: 59
End: 2019-09-13

## 2019-09-13 DIAGNOSIS — E55.9 VITAMIN D DEFICIENCY: Primary | ICD-10-CM

## 2019-09-17 ENCOUNTER — TELEPHONE (OUTPATIENT)
Dept: OTOLARYNGOLOGY | Age: 59
End: 2019-09-17

## 2019-09-23 ENCOUNTER — TELEPHONE (OUTPATIENT)
Dept: OBGYN | Age: 59
End: 2019-09-23

## 2019-09-24 ENCOUNTER — HOSPITAL ENCOUNTER (OUTPATIENT)
Dept: LAB | Age: 59
Discharge: HOME | End: 2019-09-24
Payer: MEDICAID

## 2019-09-24 DIAGNOSIS — E55.9 VITAMIN D DEFICIENCY: ICD-10-CM

## 2019-09-24 LAB — VITAMIN D 25-HYDROXY: 25.9 NG/ML (ref 30–100)

## 2019-09-24 PROCEDURE — 82306 VITAMIN D 25 HYDROXY: CPT

## 2019-09-24 PROCEDURE — 36415 COLL VENOUS BLD VENIPUNCTURE: CPT

## 2019-09-26 ENCOUNTER — TELEPHONE (OUTPATIENT)
Dept: OBGYN | Age: 59
End: 2019-09-26

## 2019-09-26 DIAGNOSIS — E55.9 VITAMIN D DEFICIENCY: Primary | ICD-10-CM

## 2019-09-26 RX ORDER — ERGOCALCIFEROL 1.25 MG/1
50000 CAPSULE ORAL WEEKLY
Qty: 16 CAPSULE | Refills: 0 | Status: SHIPPED | OUTPATIENT
Start: 2019-09-26 | End: 2020-01-27 | Stop reason: SDUPTHER

## 2019-10-21 ENCOUNTER — OFFICE VISIT (OUTPATIENT)
Dept: OBGYN | Age: 59
End: 2019-10-21
Payer: MEDICAID

## 2019-10-21 ENCOUNTER — HOSPITAL ENCOUNTER (OUTPATIENT)
Age: 59
Setting detail: SPECIMEN
Discharge: HOME | End: 2019-10-21
Payer: MEDICAID

## 2019-10-21 VITALS
DIASTOLIC BLOOD PRESSURE: 80 MMHG | HEIGHT: 64 IN | BODY MASS INDEX: 24.92 KG/M2 | HEART RATE: 82 BPM | WEIGHT: 146 LBS | SYSTOLIC BLOOD PRESSURE: 132 MMHG

## 2019-10-21 DIAGNOSIS — N95.1 MENOPAUSAL STATE: ICD-10-CM

## 2019-10-21 DIAGNOSIS — R30.0 DYSURIA: ICD-10-CM

## 2019-10-21 DIAGNOSIS — N94.10 DYSPAREUNIA, FEMALE: ICD-10-CM

## 2019-10-21 DIAGNOSIS — R30.0 DYSURIA: Primary | ICD-10-CM

## 2019-10-21 DIAGNOSIS — R68.82 DECREASED LIBIDO: ICD-10-CM

## 2019-10-21 PROCEDURE — 99213 OFFICE O/P EST LOW 20 MIN: CPT | Performed by: ADVANCED PRACTICE MIDWIFE

## 2019-10-21 PROCEDURE — 3017F COLORECTAL CA SCREEN DOC REV: CPT | Performed by: ADVANCED PRACTICE MIDWIFE

## 2019-10-21 PROCEDURE — G8419 CALC BMI OUT NRM PARAM NOF/U: HCPCS | Performed by: ADVANCED PRACTICE MIDWIFE

## 2019-10-21 PROCEDURE — 87086 URINE CULTURE/COLONY COUNT: CPT

## 2019-10-21 PROCEDURE — 87077 CULTURE AEROBIC IDENTIFY: CPT

## 2019-10-21 PROCEDURE — G8484 FLU IMMUNIZE NO ADMIN: HCPCS | Performed by: ADVANCED PRACTICE MIDWIFE

## 2019-10-21 PROCEDURE — 81001 URINALYSIS AUTO W/SCOPE: CPT

## 2019-10-21 PROCEDURE — 87186 SC STD MICRODIL/AGAR DIL: CPT

## 2019-10-21 PROCEDURE — G8427 DOCREV CUR MEDS BY ELIG CLIN: HCPCS | Performed by: ADVANCED PRACTICE MIDWIFE

## 2019-10-21 PROCEDURE — G8599 NO ASA/ANTIPLAT THER USE RNG: HCPCS | Performed by: ADVANCED PRACTICE MIDWIFE

## 2019-10-21 PROCEDURE — 4004F PT TOBACCO SCREEN RCVD TLK: CPT | Performed by: ADVANCED PRACTICE MIDWIFE

## 2019-10-21 ASSESSMENT — ENCOUNTER SYMPTOMS
RESPIRATORY NEGATIVE: 1
EYES NEGATIVE: 1
GASTROINTESTINAL NEGATIVE: 1

## 2019-10-22 ENCOUNTER — TELEPHONE (OUTPATIENT)
Dept: OBGYN | Age: 59
End: 2019-10-22

## 2019-10-23 DIAGNOSIS — N39.0 UTI (URINARY TRACT INFECTION) DUE TO ENTEROCOCCUS: Primary | ICD-10-CM

## 2019-10-23 DIAGNOSIS — B95.2 UTI (URINARY TRACT INFECTION) DUE TO ENTEROCOCCUS: Primary | ICD-10-CM

## 2019-10-23 LAB
CULTURE: ABNORMAL
Lab: ABNORMAL
SPECIMEN DESCRIPTION: ABNORMAL

## 2019-10-23 RX ORDER — NITROFURANTOIN 25; 75 MG/1; MG/1
100 CAPSULE ORAL 2 TIMES DAILY
Qty: 14 CAPSULE | Refills: 0 | Status: SHIPPED | OUTPATIENT
Start: 2019-10-23 | End: 2019-10-30

## 2019-10-28 ENCOUNTER — OFFICE VISIT (OUTPATIENT)
Dept: FAMILY MEDICINE CLINIC | Age: 59
End: 2019-10-28
Payer: MEDICAID

## 2019-10-28 VITALS
DIASTOLIC BLOOD PRESSURE: 78 MMHG | HEIGHT: 64 IN | BODY MASS INDEX: 24.92 KG/M2 | SYSTOLIC BLOOD PRESSURE: 122 MMHG | WEIGHT: 146 LBS | HEART RATE: 64 BPM

## 2019-10-28 DIAGNOSIS — E78.00 HYPERCHOLESTEROLEMIA: ICD-10-CM

## 2019-10-28 DIAGNOSIS — M19.90 ARTHRITIS: ICD-10-CM

## 2019-10-28 DIAGNOSIS — I10 ESSENTIAL HYPERTENSION: Primary | ICD-10-CM

## 2019-10-28 DIAGNOSIS — M62.838 MUSCLE SPASM: ICD-10-CM

## 2019-10-28 PROCEDURE — G8484 FLU IMMUNIZE NO ADMIN: HCPCS | Performed by: PHYSICIAN ASSISTANT

## 2019-10-28 PROCEDURE — 3017F COLORECTAL CA SCREEN DOC REV: CPT | Performed by: PHYSICIAN ASSISTANT

## 2019-10-28 PROCEDURE — 99214 OFFICE O/P EST MOD 30 MIN: CPT | Performed by: PHYSICIAN ASSISTANT

## 2019-10-28 PROCEDURE — G8419 CALC BMI OUT NRM PARAM NOF/U: HCPCS | Performed by: PHYSICIAN ASSISTANT

## 2019-10-28 PROCEDURE — G8427 DOCREV CUR MEDS BY ELIG CLIN: HCPCS | Performed by: PHYSICIAN ASSISTANT

## 2019-10-28 PROCEDURE — G8599 NO ASA/ANTIPLAT THER USE RNG: HCPCS | Performed by: PHYSICIAN ASSISTANT

## 2019-10-28 PROCEDURE — 4004F PT TOBACCO SCREEN RCVD TLK: CPT | Performed by: PHYSICIAN ASSISTANT

## 2019-10-28 RX ORDER — ATORVASTATIN CALCIUM 20 MG/1
20 TABLET, FILM COATED ORAL DAILY
Qty: 30 TABLET | Refills: 6 | Status: SHIPPED | OUTPATIENT
Start: 2019-10-28 | End: 2020-12-14

## 2019-10-28 RX ORDER — MELOXICAM 15 MG/1
15 TABLET ORAL DAILY PRN
Qty: 30 TABLET | Refills: 6 | Status: SHIPPED | OUTPATIENT
Start: 2019-10-28 | End: 2020-08-11

## 2019-10-28 RX ORDER — LISINOPRIL 10 MG/1
10 TABLET ORAL DAILY
Qty: 30 TABLET | Refills: 6 | Status: SHIPPED | OUTPATIENT
Start: 2019-10-28 | End: 2020-08-21

## 2019-10-28 RX ORDER — CYCLOBENZAPRINE HCL 10 MG
TABLET ORAL
Qty: 45 TABLET | Refills: 1 | Status: SHIPPED | OUTPATIENT
Start: 2019-10-28 | End: 2020-02-05

## 2019-10-28 ASSESSMENT — ENCOUNTER SYMPTOMS
BACK PAIN: 1
BOWEL INCONTINENCE: 0
RESPIRATORY NEGATIVE: 1

## 2019-12-09 ENCOUNTER — HOSPITAL ENCOUNTER (OUTPATIENT)
Dept: GENERAL RADIOLOGY | Age: 59
Discharge: HOME | End: 2019-12-11
Payer: MEDICAID

## 2019-12-09 ENCOUNTER — OFFICE VISIT (OUTPATIENT)
Dept: UROLOGY | Age: 59
End: 2019-12-09
Payer: MEDICAID

## 2019-12-09 VITALS
BODY MASS INDEX: 24.92 KG/M2 | HEART RATE: 72 BPM | DIASTOLIC BLOOD PRESSURE: 80 MMHG | WEIGHT: 145.94 LBS | RESPIRATION RATE: 12 BRPM | SYSTOLIC BLOOD PRESSURE: 118 MMHG | HEIGHT: 64 IN | OXYGEN SATURATION: 99 %

## 2019-12-09 DIAGNOSIS — N39.0 RECURRENT UTI: ICD-10-CM

## 2019-12-09 DIAGNOSIS — N20.0 KIDNEY STONES: Primary | ICD-10-CM

## 2019-12-09 DIAGNOSIS — Z87.440 HISTORY OF UTI: ICD-10-CM

## 2019-12-09 DIAGNOSIS — N20.1 LEFT URETERAL STONE: ICD-10-CM

## 2019-12-09 PROCEDURE — G8427 DOCREV CUR MEDS BY ELIG CLIN: HCPCS | Performed by: UROLOGY

## 2019-12-09 PROCEDURE — 3017F COLORECTAL CA SCREEN DOC REV: CPT | Performed by: UROLOGY

## 2019-12-09 PROCEDURE — 74018 RADEX ABDOMEN 1 VIEW: CPT

## 2019-12-09 PROCEDURE — G8419 CALC BMI OUT NRM PARAM NOF/U: HCPCS | Performed by: UROLOGY

## 2019-12-09 PROCEDURE — 4004F PT TOBACCO SCREEN RCVD TLK: CPT | Performed by: UROLOGY

## 2019-12-09 PROCEDURE — G8599 NO ASA/ANTIPLAT THER USE RNG: HCPCS | Performed by: UROLOGY

## 2019-12-09 PROCEDURE — G8484 FLU IMMUNIZE NO ADMIN: HCPCS | Performed by: UROLOGY

## 2019-12-09 PROCEDURE — 99214 OFFICE O/P EST MOD 30 MIN: CPT | Performed by: UROLOGY

## 2019-12-09 SDOH — HEALTH STABILITY: MENTAL HEALTH: HOW MANY STANDARD DRINKS CONTAINING ALCOHOL DO YOU HAVE ON A TYPICAL DAY?: 1 OR 2

## 2019-12-09 SDOH — HEALTH STABILITY: MENTAL HEALTH: HOW OFTEN DO YOU HAVE A DRINK CONTAINING ALCOHOL?: MONTHLY OR LESS

## 2019-12-10 ENCOUNTER — OFFICE VISIT (OUTPATIENT)
Dept: OTOLARYNGOLOGY | Age: 59
End: 2019-12-10
Payer: MEDICAID

## 2019-12-10 VITALS
HEIGHT: 64 IN | HEART RATE: 88 BPM | DIASTOLIC BLOOD PRESSURE: 80 MMHG | WEIGHT: 145.94 LBS | OXYGEN SATURATION: 98 % | TEMPERATURE: 97.3 F | SYSTOLIC BLOOD PRESSURE: 120 MMHG | BODY MASS INDEX: 24.92 KG/M2

## 2019-12-10 DIAGNOSIS — H66.90 CHRONIC OTITIS MEDIA, UNSPECIFIED OTITIS MEDIA TYPE: Primary | ICD-10-CM

## 2019-12-10 PROCEDURE — G8419 CALC BMI OUT NRM PARAM NOF/U: HCPCS | Performed by: OTOLARYNGOLOGY

## 2019-12-10 PROCEDURE — 3017F COLORECTAL CA SCREEN DOC REV: CPT | Performed by: OTOLARYNGOLOGY

## 2019-12-10 PROCEDURE — G8427 DOCREV CUR MEDS BY ELIG CLIN: HCPCS | Performed by: OTOLARYNGOLOGY

## 2019-12-10 PROCEDURE — 4004F PT TOBACCO SCREEN RCVD TLK: CPT | Performed by: OTOLARYNGOLOGY

## 2019-12-10 PROCEDURE — G8484 FLU IMMUNIZE NO ADMIN: HCPCS | Performed by: OTOLARYNGOLOGY

## 2019-12-10 PROCEDURE — G8599 NO ASA/ANTIPLAT THER USE RNG: HCPCS | Performed by: OTOLARYNGOLOGY

## 2019-12-10 PROCEDURE — 99213 OFFICE O/P EST LOW 20 MIN: CPT | Performed by: OTOLARYNGOLOGY

## 2020-01-13 ENCOUNTER — TELEPHONE (OUTPATIENT)
Dept: MAMMOGRAPHY | Age: 60
End: 2020-01-13

## 2020-01-16 ENCOUNTER — HOSPITAL ENCOUNTER (OUTPATIENT)
Dept: MAMMOGRAPHY | Age: 60
Discharge: HOME | End: 2020-01-18
Payer: MEDICAID

## 2020-01-16 PROCEDURE — 77063 BREAST TOMOSYNTHESIS BI: CPT

## 2020-01-23 ENCOUNTER — OFFICE VISIT (OUTPATIENT)
Dept: OBGYN | Age: 60
End: 2020-01-23
Payer: MEDICAID

## 2020-01-23 ENCOUNTER — TELEPHONE (OUTPATIENT)
Dept: SURGERY | Age: 60
End: 2020-01-23

## 2020-01-23 VITALS
HEART RATE: 78 BPM | SYSTOLIC BLOOD PRESSURE: 132 MMHG | DIASTOLIC BLOOD PRESSURE: 80 MMHG | BODY MASS INDEX: 23.9 KG/M2 | WEIGHT: 140 LBS | HEIGHT: 64 IN

## 2020-01-23 PROCEDURE — G8484 FLU IMMUNIZE NO ADMIN: HCPCS | Performed by: ADVANCED PRACTICE MIDWIFE

## 2020-01-23 PROCEDURE — 99386 PREV VISIT NEW AGE 40-64: CPT | Performed by: ADVANCED PRACTICE MIDWIFE

## 2020-01-23 PROCEDURE — 99396 PREV VISIT EST AGE 40-64: CPT | Performed by: ADVANCED PRACTICE MIDWIFE

## 2020-01-23 ASSESSMENT — PATIENT HEALTH QUESTIONNAIRE - PHQ9
2. FEELING DOWN, DEPRESSED OR HOPELESS: 0
SUM OF ALL RESPONSES TO PHQ QUESTIONS 1-9: 0
1. LITTLE INTEREST OR PLEASURE IN DOING THINGS: 0
SUM OF ALL RESPONSES TO PHQ9 QUESTIONS 1 & 2: 0
SUM OF ALL RESPONSES TO PHQ QUESTIONS 1-9: 0

## 2020-01-23 ASSESSMENT — ENCOUNTER SYMPTOMS
RESPIRATORY NEGATIVE: 1
GASTROINTESTINAL NEGATIVE: 1
EYES NEGATIVE: 1

## 2020-01-23 NOTE — PROGRESS NOTES
Subjective:      Patient ID: Melissa Cha  is a 63 y.o. y.o. female. Jim Daily presents today for an annual examination. She reports no concerns at this time. She states that she stopped her premarin, it did not seem to be helping and her co-pay was $15.  She continues to use the testosterone cream and it is helping. She desires continued use of that. She is sexually active and reports no pain. She has reduced her tobacco use to 1/2 PPD. Review of Systems   Constitutional: Negative. HENT: Negative. Eyes: Negative. Respiratory: Negative. Cardiovascular: Negative. Gastrointestinal: Negative. Genitourinary: Negative. Musculoskeletal: Negative. Skin: Negative. Neurological: Negative. Psychiatric/Behavioral: Negative. Breast ROS: negative    Objective:   /80 (Site: Left Upper Arm, Position: Sitting, Cuff Size: Medium Adult)   Pulse 78   Ht 5' 4\" (1.626 m)   Wt 140 lb (63.5 kg)   BMI 24.03 kg/m²   Physical Exam  Constitutional:       Appearance: She is well-developed. HENT:      Head: Normocephalic and atraumatic. Eyes:      Conjunctiva/sclera: Conjunctivae normal.   Neck:      Musculoskeletal: Normal range of motion and neck supple. Cardiovascular:      Rate and Rhythm: Normal rate and regular rhythm. Heart sounds: Normal heart sounds. Pulmonary:      Effort: Pulmonary effort is normal.      Breath sounds: Normal breath sounds. Chest:      Breasts: Breasts are symmetrical.         Right: No inverted nipple, mass, nipple discharge, skin change or tenderness. Left: No inverted nipple, mass, nipple discharge, skin change or tenderness. Abdominal:      Palpations: Abdomen is soft. Genitourinary:     Vagina: Normal.      Comments: External genitalia WNL, no  changes noted, no lesions. Vaginal canal pink with rugae present and scanty discharge noted, nonodorous. Uterus, cervix and right adnexa surgically absent. Left Adnexa not palpable. Musculoskeletal: Normal range of motion. Skin:     General: Skin is warm and dry. Neurological:      Mental Status: She is alert and oriented to person, place, and time. Deep Tendon Reflexes: Reflexes are normal and symmetric. Sexually active: Yes  Any bleeding or pain withintercourse: No, improved. Last Yearly:  1/2019  Last Pap: 1/2019 Negative DELBERT  Last Mammogram: 1/2020  BiRads 1  Last Dexascan No previous, declines  Do you do self breast exams: No    Assessment:      Diagnosis Orders   1. Women's annual routine gynecological examination     2. Menopausal state     3. Screening for malignant neoplasm of breast  CORA DIGITAL SCREEN W OR WO CAD BILATERAL   4. Screen for colon cancer  Randal Torre MD, General Surgery, [place]           Plan:      Orders Placed This Encounter   Procedures    CORA DIGITAL SCREEN W OR WO CAD BILATERAL     Standing Status:   Future     Standing Expiration Date:   3/23/2021     Order Specific Question:   Reason for exam:     Answer:   screening   Randal Torre MD, General Surgery, [place]     Referral Priority:   Routine     Referral Type:   Eval and Treat     Referral Reason:   Specialty Services Required     Referred to Provider:   Mendel Gibbons MD     Requested Specialty:   General Surgery     Number of Visits Requested:   1     Education: discussed diet with calcium intake 3590-1696 mg./day with weight bearing exercise 30-50 minutes 3-5x/week. SBE monthly, screening mammogram yearly. Will refer to general surgery, she is due for repeat colonoscopy 5/2020. Encourage further tobacco reduction / cessation. RTO 12 months and prn.

## 2020-01-25 ENCOUNTER — APPOINTMENT (OUTPATIENT)
Dept: LAB | Age: 60
End: 2020-01-25
Payer: MEDICAID

## 2020-01-25 ENCOUNTER — HOSPITAL ENCOUNTER (OUTPATIENT)
Dept: LAB | Age: 60
Discharge: HOME | End: 2020-01-25
Payer: MEDICAID

## 2020-01-25 LAB
ALBUMIN SERPL-MCNC: 4.8 G/DL (ref 3.5–5.2)
ALBUMIN/GLOBULIN RATIO: 1.8 (ref 1–2.5)
ALP BLD-CCNC: 77 U/L (ref 35–104)
ALT SERPL-CCNC: 20 U/L (ref 5–33)
ANION GAP SERPL CALCULATED.3IONS-SCNC: 14 MMOL/L (ref 9–17)
AST SERPL-CCNC: 18 U/L
BILIRUB SERPL-MCNC: 0.23 MG/DL (ref 0.3–1.2)
BUN BLDV-MCNC: 19 MG/DL (ref 6–20)
BUN/CREAT BLD: 21 (ref 9–20)
CALCIUM SERPL-MCNC: 10.3 MG/DL (ref 8.6–10.4)
CHLORIDE BLD-SCNC: 102 MMOL/L (ref 98–107)
CHOLESTEROL/HDL RATIO: 3.3
CHOLESTEROL: 194 MG/DL
CO2: 24 MMOL/L (ref 20–31)
CREAT SERPL-MCNC: 0.91 MG/DL (ref 0.5–0.9)
GFR AFRICAN AMERICAN: >60 ML/MIN
GFR NON-AFRICAN AMERICAN: >60 ML/MIN
GFR SERPL CREATININE-BSD FRML MDRD: ABNORMAL ML/MIN/{1.73_M2}
GFR SERPL CREATININE-BSD FRML MDRD: ABNORMAL ML/MIN/{1.73_M2}
GLUCOSE BLD-MCNC: 111 MG/DL (ref 70–99)
HDLC SERPL-MCNC: 58 MG/DL
LDL CHOLESTEROL: 112 MG/DL (ref 0–130)
POTASSIUM SERPL-SCNC: 4.7 MMOL/L (ref 3.7–5.3)
SODIUM BLD-SCNC: 140 MMOL/L (ref 135–144)
TOTAL PROTEIN: 7.5 G/DL (ref 6.4–8.3)
TRIGL SERPL-MCNC: 122 MG/DL
VITAMIN D 25-HYDROXY: 27.8 NG/ML (ref 30–100)
VLDLC SERPL CALC-MCNC: NORMAL MG/DL (ref 1–30)

## 2020-01-25 PROCEDURE — 80061 LIPID PANEL: CPT

## 2020-01-25 PROCEDURE — 80053 COMPREHEN METABOLIC PANEL: CPT

## 2020-01-25 PROCEDURE — 36415 COLL VENOUS BLD VENIPUNCTURE: CPT

## 2020-01-25 PROCEDURE — 82306 VITAMIN D 25 HYDROXY: CPT

## 2020-01-27 RX ORDER — ERGOCALCIFEROL 1.25 MG/1
50000 CAPSULE ORAL WEEKLY
Qty: 16 CAPSULE | Refills: 3 | Status: SHIPPED | OUTPATIENT
Start: 2020-01-27 | End: 2021-03-08 | Stop reason: SDUPTHER

## 2020-02-05 RX ORDER — CYCLOBENZAPRINE HCL 10 MG
TABLET ORAL
Qty: 45 TABLET | Refills: 1 | Status: SHIPPED | OUTPATIENT
Start: 2020-02-05 | End: 2020-04-20

## 2020-03-02 ENCOUNTER — TELEPHONE (OUTPATIENT)
Dept: SURGERY | Age: 60
End: 2020-03-02

## 2020-04-03 ENCOUNTER — OFFICE VISIT (OUTPATIENT)
Dept: PRIMARY CARE CLINIC | Age: 60
End: 2020-04-03
Payer: MEDICAID

## 2020-04-03 VITALS
WEIGHT: 135.2 LBS | OXYGEN SATURATION: 94 % | SYSTOLIC BLOOD PRESSURE: 96 MMHG | BODY MASS INDEX: 23.08 KG/M2 | HEART RATE: 122 BPM | DIASTOLIC BLOOD PRESSURE: 62 MMHG | TEMPERATURE: 99.1 F | HEIGHT: 64 IN

## 2020-04-03 PROCEDURE — 3017F COLORECTAL CA SCREEN DOC REV: CPT | Performed by: FAMILY MEDICINE

## 2020-04-03 PROCEDURE — 4004F PT TOBACCO SCREEN RCVD TLK: CPT | Performed by: FAMILY MEDICINE

## 2020-04-03 PROCEDURE — G8420 CALC BMI NORM PARAMETERS: HCPCS | Performed by: FAMILY MEDICINE

## 2020-04-03 PROCEDURE — 99212 OFFICE O/P EST SF 10 MIN: CPT

## 2020-04-03 PROCEDURE — 99214 OFFICE O/P EST MOD 30 MIN: CPT | Performed by: FAMILY MEDICINE

## 2020-04-03 PROCEDURE — G8427 DOCREV CUR MEDS BY ELIG CLIN: HCPCS | Performed by: FAMILY MEDICINE

## 2020-04-03 RX ORDER — ALBUTEROL SULFATE 90 UG/1
2 AEROSOL, METERED RESPIRATORY (INHALATION) EVERY 6 HOURS PRN
Qty: 1 INHALER | Refills: 1 | Status: ON HOLD | OUTPATIENT
Start: 2020-04-03 | End: 2022-05-16

## 2020-04-03 RX ORDER — BUDESONIDE AND FORMOTEROL FUMARATE DIHYDRATE 160; 4.5 UG/1; UG/1
2 AEROSOL RESPIRATORY (INHALATION) 2 TIMES DAILY
Qty: 1 INHALER | Refills: 1 | Status: SHIPPED | OUTPATIENT
Start: 2020-04-03 | End: 2021-01-26

## 2020-04-03 ASSESSMENT — ENCOUNTER SYMPTOMS
TROUBLE SWALLOWING: 0
CONSTIPATION: 0
NAUSEA: 0
RHINORRHEA: 0
WHEEZING: 1
SINUS PRESSURE: 0
DIARRHEA: 0
SORE THROAT: 0
CHOKING: 0
CHEST TIGHTNESS: 0
SHORTNESS OF BREATH: 1
COUGH: 1

## 2020-04-03 NOTE — PROGRESS NOTES
[facility] [address] [place] [facility]  [facility] [place]  [place] [address]  Dept: [phone]  Dept Fax: [phone]  Loc: [phone]    Kaitlin Beltran is a 63 y.o. female who presents today for her medical conditions/complaints as noted below. Kaitlin Beltran is c/o of   Chief Complaint   Patient presents with    Cough     fatigue, bodyaches, slight SOB, headaches, chest congestion, x 1 week, intermittant chills denies any fever-       HPI:     Here today for a cough and flulike symptoms. Cough   This is a new problem. The current episode started in the past 7 days. The problem has been rapidly worsening. The problem occurs every few minutes. The cough is productive of sputum. Associated symptoms include chills, a fever, headaches, myalgias, shortness of breath and wheezing. Pertinent negatives include no chest pain, ear congestion, ear pain, nasal congestion, postnasal drip, rash, rhinorrhea or sore throat. The symptoms are aggravated by exercise. Treatments tried: tylenol. The treatment provided no relief. Her past medical history is significant for COPD. There is no history of environmental allergies. Past Medical History:   Diagnosis Date    COPD (chronic obstructive pulmonary disease) ([address]     Hemorrhoids     History of herpes zoster 2002    (right lower rib area laterally and anteriorly).  History of thyroiditis 1992    History of ulcerative colitis     (of 20 years disease - though quiescent).  Hypercholesterolemia     Hypertension     Neuritis     Shortened NC interval     (on EKG from 2004).  Tobacco abuse     Venous insufficiency, peripheral     (bilateral).           Social History     Tobacco Use    Smoking status: Current Every Day Smoker     Packs/day: 0.50     Years: 15.00     Pack years: 7.50     Start date: 1/1/1981    Smokeless tobacco: Never Used    Tobacco comment: Victor Hugo Saleh RRT 8/4/19   Substance Use Topics    Alcohol use: Yes     Alcohol/week: 0.0 standard drinks     Frequency: Monthly or less     Drinks per session: 1 or 2     Binge frequency: Never     Comment: occasional     Current Outpatient Medications   Medication Sig Dispense Refill    albuterol sulfate  (90 Base) MCG/ACT inhaler Inhale 2 puffs into the lungs every 6 hours as needed for Wheezing 1 Inhaler 1    budesonide-formoterol (SYMBICORT) 160-4.5 MCG/ACT AERO Inhale 2 puffs into the lungs 2 times daily 1 Inhaler 1    cyclobenzaprine (FLEXERIL) 10 MG tablet take 1 tablet by mouth three times a day if needed for muscle spasm 45 tablet 1    vitamin D (ERGOCALCIFEROL) 1.25 MG (36586 UT) CAPS capsule Take 1 capsule by mouth once a week 16 capsule 3    meloxicam (MOBIC) 15 MG tablet Take 1 tablet by mouth daily as needed for Pain 30 tablet 6    atorvastatin (LIPITOR) 20 MG tablet Take 1 tablet by mouth daily 30 tablet 6    lisinopril (PRINIVIL;ZESTRIL) 10 MG tablet Take 1 tablet by mouth daily 30 tablet 6    conjugated estrogens (PREMARIN) 0.625 MG/GM vaginal cream Place 0.5 g vaginally Twice a Week At bedtime. 1 Tube 3    neomycin-polymyxin-hydrocortisone (CORTISPORIN) 3.5-24098-1 otic suspension 3 gtts BID to L ear 1 Bottle 1    Glucosamine-Chondroit-Vit C-Mn (GLUCOSAMINE 1500 COMPLEX PO) Take by mouth daily      CRANBERRY PO Take by mouth daily      Multiple Vitamins-Minerals (HAIR/SKIN/NAILS PO) Take by mouth daily      ascorbic acid (VITAMIN C) 500 MG tablet Take 500 mg by mouth daily.  nicotine (NICODERM CQ) 21 MG/24HR Place 1 patch onto the skin daily as needed (as needed if a smoker) (Patient not taking: Reported on 12/10/2019) 30 patch 0     No current facility-administered medications for this visit. No Known Allergies    Subjective:     Review of Systems   Constitutional: Positive for chills, fatigue and fever. Negative for activity change and appetite change. HENT: Positive for congestion and sneezing.  Negative for

## 2020-04-20 RX ORDER — CYCLOBENZAPRINE HCL 10 MG
TABLET ORAL
Qty: 45 TABLET | Refills: 0 | Status: SHIPPED | OUTPATIENT
Start: 2020-04-20 | End: 2020-07-29 | Stop reason: SDUPTHER

## 2020-06-09 RX ORDER — CYCLOBENZAPRINE HCL 10 MG
TABLET ORAL
Qty: 45 TABLET | Refills: 0 | OUTPATIENT
Start: 2020-06-09

## 2020-06-15 ENCOUNTER — HOSPITAL ENCOUNTER (OUTPATIENT)
Dept: GENERAL RADIOLOGY | Age: 60
Discharge: HOME | End: 2020-06-17
Payer: MEDICAID

## 2020-06-15 ENCOUNTER — OFFICE VISIT (OUTPATIENT)
Dept: UROLOGY | Age: 60
End: 2020-06-15
Payer: MEDICAID

## 2020-06-15 VITALS
HEIGHT: 64 IN | HEART RATE: 74 BPM | BODY MASS INDEX: 24.41 KG/M2 | DIASTOLIC BLOOD PRESSURE: 78 MMHG | RESPIRATION RATE: 12 BRPM | OXYGEN SATURATION: 97 % | TEMPERATURE: 97.1 F | SYSTOLIC BLOOD PRESSURE: 122 MMHG | WEIGHT: 143 LBS

## 2020-06-15 PROCEDURE — 3017F COLORECTAL CA SCREEN DOC REV: CPT | Performed by: UROLOGY

## 2020-06-15 PROCEDURE — G8427 DOCREV CUR MEDS BY ELIG CLIN: HCPCS | Performed by: UROLOGY

## 2020-06-15 PROCEDURE — 99214 OFFICE O/P EST MOD 30 MIN: CPT

## 2020-06-15 PROCEDURE — 99214 OFFICE O/P EST MOD 30 MIN: CPT | Performed by: UROLOGY

## 2020-06-15 PROCEDURE — 4004F PT TOBACCO SCREEN RCVD TLK: CPT | Performed by: UROLOGY

## 2020-06-15 PROCEDURE — G8420 CALC BMI NORM PARAMETERS: HCPCS | Performed by: UROLOGY

## 2020-06-15 PROCEDURE — 74018 RADEX ABDOMEN 1 VIEW: CPT

## 2020-06-15 NOTE — PROGRESS NOTES
[facility]  [address]  [place] [address]erm[address]  Dept: [phone]  Dept Fax: [phone]  Loc: [phone]    [address], [address] [facility] Urology Office Note - Follow Up Visit    Patient:  Alejandro Blanco  YOB: 1960  Date: 6/15/2020    The patient is a 63 y.o. female who presents today for evaluation of the following problems: left ureteral stone  Chief Complaint   Patient presents with    Nephrolithiasis     6 mo w/ KUB         HISTORY OF PRESENT ILLNESS:     Kidney stones  Onset was days ago  Overall, the problem(s) are better  Severity is described as mild-moderate. Associated Symptoms: No dysuria, no gross hematuria. Current Pain Severity: 0    Hx of ESWL Here with KUB. No new stones  Had UTI and obstructing stone in past      Recurrent UTI  Onset was days ago  Overall, the problem(s) are unchanged  Severity is described as mild-moderate. Associated Symptoms: No dysuria, no gross hematuria. Current Pain Severity: 0    No recent UTI  Usually treats as they come. Not bothered. Requested/reviewed records from [place], [place] office and/or outside physician/EMR    (Patient's old records have been requested, reviewed and pertinent findings summarized in today's note.)    Procedures Today: N/A    Last several PSA's:  No results found for: PSA    Last total testosterone:  No results found for: TESTOSTERONE    Urinalysis today:  No results found for this visit on 06/15/20.     Last BUN and creatinine:  Lab Results   Component Value Date    BUN 19 01/25/2020     Lab Results   Component Value Date    CREATININE 0.91 (H) 01/25/2020       Additional Lab/Culture results: none    Imaging Reviewed during this Office Visit:   Tim Childers MD independently reviewed the images and verified the radiology reports from:    imaging independently reviewed by Tim Childers MD and radiology report verified demonstrating     KUB- no stones visualized. Ct Abdomen Pelvis Wo Contrast    Result Date: 8/4/2019  EXAMINATION: CT OF THE ABDOMEN AND PELVIS WITHOUT CONTRAST 8/4/2019 5:26 am TECHNIQUE: CT of the abdomen and pelvis was performed without the administration of intravenous contrast. Multiplanar reformatted images are provided for review. Dose modulation, iterative reconstruction, and/or weight based adjustment of the mA/kV was utilized to reduce the radiation dose to as low as reasonably achievable. COMPARISON: 12/03/2009 HISTORY: ORDERING SYSTEM PROVIDED HISTORY: left flank pain TECHNOLOGIST PROVIDED HISTORY: WITHOUT Contrast Reason for Exam: Left flank pain since 1030 with nausea and vomiting, hx cholecystectomy, appendectomy, hysterectomy Acuity: Acute Type of Exam: Initial FINDINGS: Lower Chest: Mild atelectatic changes in the dependent lung bases. Organs: Cholecystectomy. Unenhanced parenchymal attenuation pattern of the liver, spleen, adrenal glands, pancreas is unremarkable. Right kidney is normal.  Moderate hydronephrosis on the left as result of an obstructing 7 x 5 mm calculus in the proximal left ureter. Additional left kidney lower pole calculi, 6 and 2 mm. GI/Bowel: No bowel dilatation to suggest obstruction. Appendectomy. Pelvis: The urinary bladder appears unremarkable. The pelvic organs demonstrate no acute abnormality. Peritoneum/Retroperitoneum: The abdominal aorta is normal in caliber. Moderate atherosclerotic change. No fluid collection. No free air. No gross lymphadenopathy. Bones/Soft Tissues: No acute findings. Moderate left hydronephrosis as a result of an obstructing 7 x 5 mm calculus proximal left ureter. Left nephrolithiasis. Xr Abdomen (kub) (single Ap View)    Result Date: 8/5/2019  EXAMINATION: ONE SUPINE XRAY VIEW(S) OF THE ABDOMEN 8/5/2019 11:52 am COMPARISON: Abdomen earlier the same day at 624 hours.  HISTORY: ORDERING SYSTEM PROVIDED HISTORY: CYSTO WITH STENT PLACEMENT TECHNOLOGIST PROVIDED HISTORY: CYSTO WITH STENT PLACEMENT Reason for Exam: C-arm left stent placement Acuity: Acute Type of Exam: Initial 2 C-arm images FINDINGS: Images document a double pigtail left ureteral stent in appropriate position. The bowel gas pattern is nonobstructive. A density overlies the left mid ureteral stent possibly a calculus. Left ureteral stent placement. Density to the left of the spine at L4 redemonstrated likely a ureteral calculus. Xr Abdomen (kub) (single Ap View)    Result Date: 8/5/2019  EXAMINATION: ONE SUPINE XRAY VIEW(S) OF THE ABDOMEN 8/5/2019 6:35 am COMPARISON: CT abdomen and pelvis August 4, 2019 HISTORY: ORDERING SYSTEM PROVIDED HISTORY: hydronephrosis with obstructing stone TECHNOLOGIST PROVIDED HISTORY: hydronephrosis with obstructing stone Reason for Exam: Left sided obstructing stone Acuity: Acute Type of Exam: Subsequent/Follow-up FINDINGS: Right upper quadrant surgical clips. No abnormally dilated loops of small bowel. Moderate stool volume. 6 mm calculus at the level of L4 on the left is unchanged. Left ureteral calculus is unchanged. PAST MEDICAL, FAMILY AND SOCIAL HISTORY:  Past Medical History:   Diagnosis Date    COPD (chronic obstructive pulmonary disease) ([facility] [address]     Hemorrhoids     History of herpes zoster 2002    (right lower rib area laterally and anteriorly).  History of thyroiditis 1992    History of ulcerative colitis     (of 20 years disease - though quiescent).  Hypercholesterolemia     Hypertension     Neuritis     Shortened [place] interval     (on EKG from 2004).  Tobacco abuse     Venous insufficiency, peripheral     (bilateral). Past Surgical History:   Procedure Laterality Date    APPENDECTOMY  1982    (Dr. Chancy Schwab) - Acute appendicitis.  CARDIAC CATHETERIZATION  2000    Preserved left ventricular systolic function. Ejection fraction 50%. Noncritical coronary disease. (Dr. Aline Munguia).    [address] [address] [address] ear 1 Bottle 1    Glucosamine-Chondroit-Vit C-Mn (GLUCOSAMINE 1500 COMPLEX PO) Take by mouth daily      CRANBERRY PO Take by mouth daily      Multiple Vitamins-Minerals (HAIR/SKIN/NAILS PO) Take by mouth daily         Patient has no known allergies. Social History     Tobacco Use   Smoking Status Current Every Day Smoker    Packs/day: 1.00    Years: 15.00    Pack years: 15.00    Start date: 1/1/1981   Smokeless Tobacco Never Used   Tobacco Comment    S Jayne Rodriguez RRT 8/4/19      (If patient a smoker, smoking cessation counseling offered)   Social History     Substance and Sexual Activity   Alcohol Use Yes    Alcohol/week: 0.0 standard drinks    Frequency: Monthly or less    Drinks per session: 1 or 2    Binge frequency: Never    Comment: occasional       REVIEW OF SYSTEMS:  Constitutional: negative  Eyes: negative  Respiratory: negative  Cardiovascular: negative  Gastrointestinal: negative  Genitourinary: see HPI  Musculoskeletal: negative  Skin: negative   Neurological: negative  Hematological/Lymphatic: negative  Psychological: negative        Physical Exam:    This a 63 y.o. female  Vitals:    06/15/20 0858   BP: 122/78   Pulse: 74   Resp: 12   Temp: 97.1 °F (36.2 °C)   SpO2: 97%     Body mass index is 24.53 kg/m². Constitutional: Patient in no acute distress;         Assessment and Plan        1. Kidney stones    2. History of UTI    3. Recurrent UTI               Plan:       Doing well today. Does occasionally have UTI, no other  symptoms. Will treat these as they come. Follow up in one year with KUB prior      Prescriptions Ordered:  No orders of the defined types were placed in this encounter. Orders Placed:  No orders of the defined types were placed in this encounter.            Helena Zamarripa MD

## 2020-07-29 ENCOUNTER — OFFICE VISIT (OUTPATIENT)
Dept: FAMILY MEDICINE CLINIC | Age: 60
End: 2020-07-29
Payer: MEDICAID

## 2020-07-29 VITALS
DIASTOLIC BLOOD PRESSURE: 80 MMHG | BODY MASS INDEX: 23.73 KG/M2 | SYSTOLIC BLOOD PRESSURE: 120 MMHG | OXYGEN SATURATION: 99 % | WEIGHT: 139 LBS | HEIGHT: 64 IN | HEART RATE: 60 BPM

## 2020-07-29 PROCEDURE — G8427 DOCREV CUR MEDS BY ELIG CLIN: HCPCS | Performed by: PHYSICIAN ASSISTANT

## 2020-07-29 PROCEDURE — 99214 OFFICE O/P EST MOD 30 MIN: CPT | Performed by: PHYSICIAN ASSISTANT

## 2020-07-29 PROCEDURE — 90715 TDAP VACCINE 7 YRS/> IM: CPT | Performed by: PHYSICIAN ASSISTANT

## 2020-07-29 PROCEDURE — 4004F PT TOBACCO SCREEN RCVD TLK: CPT | Performed by: PHYSICIAN ASSISTANT

## 2020-07-29 PROCEDURE — G8420 CALC BMI NORM PARAMETERS: HCPCS | Performed by: PHYSICIAN ASSISTANT

## 2020-07-29 PROCEDURE — 3017F COLORECTAL CA SCREEN DOC REV: CPT | Performed by: PHYSICIAN ASSISTANT

## 2020-07-29 RX ORDER — CYCLOBENZAPRINE HCL 10 MG
TABLET ORAL
Qty: 45 TABLET | Refills: 0 | Status: SHIPPED | OUTPATIENT
Start: 2020-07-29 | End: 2020-09-21

## 2020-07-29 RX ORDER — DOXYCYCLINE HYCLATE 100 MG
100 TABLET ORAL 2 TIMES DAILY
Qty: 14 TABLET | Refills: 0 | Status: SHIPPED | OUTPATIENT
Start: 2020-07-29 | End: 2020-08-05

## 2020-07-29 ASSESSMENT — ENCOUNTER SYMPTOMS
RESPIRATORY NEGATIVE: 1
GASTROINTESTINAL NEGATIVE: 1
BACK PAIN: 1
BOWEL INCONTINENCE: 0

## 2020-07-29 NOTE — PROGRESS NOTES
Subjective:      Patient ID: Aura Recio is a 63 y.o. female. Hypertension   This is a chronic problem. The current episode started more than 1 year ago. The problem is controlled. Pertinent negatives include no anxiety or peripheral edema. Past treatments include ACE inhibitors. The current treatment provides significant improvement. There are no compliance problems. Hyperlipidemia   This is a chronic problem. The current episode started more than 1 year ago. The problem is controlled. Associated symptoms include leg pain. Current antihyperlipidemic treatment includes statins. The current treatment provides significant improvement of lipids. There are no compliance problems. Back Pain   This is a chronic problem. The current episode started more than 1 year ago. The pain is present in the lumbar spine. The quality of the pain is described as aching. Associated symptoms include leg pain. Pertinent negatives include no bladder incontinence, bowel incontinence or perianal numbness. She has tried muscle relaxant for the symptoms. The treatment provided moderate relief. Wound Check   She was originally treated 5 to 10 days ago. The maximum temperature noted was less than 100.4 F. There has been colored discharge from the wound. The redness has worsened. There is no pain present. She has no difficulty moving the affected extremity or digit. Review of Systems   HENT: Negative. Respiratory: Negative. Cardiovascular: Negative. Gastrointestinal: Negative. Negative for bowel incontinence. Genitourinary: Negative for bladder incontinence. Musculoskeletal: Positive for back pain. Past Medical History:   Diagnosis Date    COPD [facility]chronic obstructive pulmonary disease) ([facility] [address]     Hemorrhoids     History of herpes zoster 2002    (right lower rib area laterally and anteriorly).  History of thyroiditis 1992    History of ulcerative colitis     (of 20 years disease - though quiescent).  Hypercholesterolemia     Hypertension     Neuritis     Shortened [place] interval     (on EKG from 2004).  Tobacco abuse     Venous insufficiency, peripheral     (bilateral). Past Surgical History:   Procedure Laterality Date    APPENDECTOMY  1982    (Dr. Kylie Rankin) - Acute appendicitis.  CARDIAC CATHETERIZATION  2000    Preserved left ventricular systolic function. Ejection fraction 50%. Noncritical coronary disease. (Dr. Alfa Mejiaor).  CHOLECYSTECTOMY  2001    COLONOSCOPY  1988    (Dr. Indigo Ware) - Normal colon.  COLONOSCOPY  5/12/16    Polyp distal sigmoid, rectum - Dr Efren Barber Left 8/5/2019    CYSTO Left Stent Insertion performed by Kaia Lopez MD at [place] [address]  2010    Incision and drainage of thrombosed hemorrhoid.  HYSTERECTOMY  2001    (no BSO).  LITHOTRIPSY Left 8/19/2019    CYSTO W/ STENT REMOVAL ET Left LITHOTRIPSY performed by Kaia Lopez MD at [facility] surgery x8.    OTHER SURGICAL HISTORY  1987    Pelvic exam under anesthesia. (Dr. Michael Macias) - Chronic pelvic pain, chronic inflammatory process changes.  TUBAL LIGATION  1982     Social History     Tobacco Use    Smoking status: Current Every Day Smoker     Packs/day: 1.00     Years: 15.00     Pack years: 15.00     Start date: 1/1/1981    Smokeless tobacco: Never Used    Tobacco comment: Jeronimo Paula RRT 8/4/19   Substance Use Topics    Alcohol use: Yes     Alcohol/week: 0.0 standard drinks     Frequency: Monthly or less     Drinks per session: 1 or 2     Binge frequency: Never     Comment: occasional    Drug use: No       Objective:   Physical Exam  HENT:      Head: Normocephalic. Right Ear: Tympanic membrane normal.      Left Ear: Tympanic membrane normal.      Mouth/Throat:      Mouth: Mucous membranes are moist.   Eyes:      Pupils: Pupils are equal, round, and reactive to light. Neck:      Musculoskeletal: Neck supple.    Cardiovascular:      Rate and Rhythm: Normal rate and regular rhythm. Pulmonary:      Effort: Pulmonary effort is normal.      Breath sounds: Normal breath sounds. Abdominal:      General: Abdomen is flat. Musculoskeletal:      Lumbar back: She exhibits spasm. Hands:    Skin:     General: Skin is warm. Neurological:      General: No focal deficit present. Mental Status: She is alert and oriented to person, place, and time. Psychiatric:         Mood and Affect: Mood normal.         Assessment:      1. Essential hypertension    2. Hypercholesterolemia    3. Vitamin D deficiency    4. Muscle spasm    5. Paronychia of finger of right hand          Plan:      Previous laboratory studies reviewed. Chronic medical conditions stable on present therapy. Continue current medications. Local wound care. Doxycycline 100 mg bid x 7 days. Tdap update today. Labs in six months. Follow-up in six months/sooner PRN.         RENATO Roth

## 2020-08-10 ENCOUNTER — OFFICE VISIT (OUTPATIENT)
Dept: OTOLARYNGOLOGY | Age: 60
End: 2020-08-10
Payer: MEDICAID

## 2020-08-10 VITALS
BODY MASS INDEX: 23.7 KG/M2 | HEIGHT: 64 IN | OXYGEN SATURATION: 97 % | DIASTOLIC BLOOD PRESSURE: 82 MMHG | HEART RATE: 77 BPM | WEIGHT: 138.8 LBS | TEMPERATURE: 96 F | SYSTOLIC BLOOD PRESSURE: 112 MMHG

## 2020-08-10 PROCEDURE — 99215 OFFICE O/P EST HI 40 MIN: CPT

## 2020-08-10 PROCEDURE — 99213 OFFICE O/P EST LOW 20 MIN: CPT | Performed by: OTOLARYNGOLOGY

## 2020-08-10 NOTE — PROGRESS NOTES
HPI:     Fu for AS retraction pocket, has had mult AU surg including AD PORP/TORP, on CTS gtts  3 month f/u bilateral chronic otitis media, left side improved but right side is hurting  Last seen by Dr. Nichelle Gale on 2019. Has been hearing \"cricket noises\". No recent audio   Wears right HA  Left ear pain, worried about possible infection vs cerumen   Last ear surgery was approx 5 years ago and she thinks it was in [place]. Has also [place] in [facility] [place]. [facility]. No recent CT temporal bone. PE:                 General appearance:  Normal                            Ability to Communicate:  Normal                  Head & Face:  Normal              Salivary Glands:  Normal                         Facial Strength:  Normal              Ears:               Pinna:  Normal                                             EAC:  Normal                                       TMs:     AD Sp TORP, severe retraction of pars flacida and tensa onto promontory, no effusion or cholesteatoma   AS crusting removed w cup, keratin debris, cholesteatoma, unable to assess middle ear well and extent of cholesteatoma               [place]earin [place]:  [place]       Rinne                           Finger Rub                 Nose:                          External: Normal                          Septum:  Normal                          Turbinates: Normal                                   Nasal Cavity: Normal                               Naso Pharyngoscopy:                           Nasal Endoscopy:                            Oral Cavity & Oral Pharynx:                          Tongue:  Normal                          Teeth & Gums:                                   Palate:  Mari                           Tonsils:                            FOM:  Normal                           Other:                 Neck:                          Thyroid:Normal                             Lymph nodes: Normal

## 2020-08-11 RX ORDER — MELOXICAM 15 MG/1
TABLET ORAL
Qty: 30 TABLET | Refills: 6 | Status: SHIPPED | OUTPATIENT
Start: 2020-08-11 | End: 2021-03-09 | Stop reason: ALTCHOICE

## 2020-08-13 ENCOUNTER — HOSPITAL ENCOUNTER (OUTPATIENT)
Dept: LAB | Age: 60
Discharge: HOME | End: 2020-08-13
Payer: MEDICAID

## 2020-08-13 LAB
CREAT SERPL-MCNC: 1.03 MG/DL (ref 0.5–0.9)
GFR AFRICAN AMERICAN: >60 ML/MIN
GFR NON-AFRICAN AMERICAN: 55 ML/MIN
GFR SERPL CREATININE-BSD FRML MDRD: ABNORMAL ML/MIN/{1.73_M2}
GFR SERPL CREATININE-BSD FRML MDRD: ABNORMAL ML/MIN/{1.73_M2}

## 2020-08-13 PROCEDURE — 36415 COLL VENOUS BLD VENIPUNCTURE: CPT

## 2020-08-13 PROCEDURE — 82565 ASSAY OF CREATININE: CPT

## 2020-08-17 ENCOUNTER — HOSPITAL ENCOUNTER (OUTPATIENT)
Dept: CT IMAGING | Age: 60
Discharge: HOME | End: 2020-08-19
Payer: MEDICAID

## 2020-08-17 PROCEDURE — 2709999900 CT IAC POSTERIOR FOSSA W CONTRAST

## 2020-08-17 PROCEDURE — 6360000004 HC RX CONTRAST MEDICATION: Performed by: OTOLARYNGOLOGY

## 2020-08-17 RX ADMIN — IOPAMIDOL 80 ML: 755 INJECTION, SOLUTION INTRAVENOUS at 09:32

## 2020-08-21 RX ORDER — LISINOPRIL 10 MG/1
TABLET ORAL
Qty: 30 TABLET | Refills: 6 | Status: SHIPPED | OUTPATIENT
Start: 2020-08-21 | End: 2021-03-09 | Stop reason: SDUPTHER

## 2020-08-31 ENCOUNTER — OFFICE VISIT (OUTPATIENT)
Dept: OTOLARYNGOLOGY | Age: 60
End: 2020-08-31
Payer: MEDICAID

## 2020-08-31 VITALS
WEIGHT: 132 LBS | DIASTOLIC BLOOD PRESSURE: 74 MMHG | SYSTOLIC BLOOD PRESSURE: 108 MMHG | BODY MASS INDEX: 22.66 KG/M2 | HEART RATE: 72 BPM | TEMPERATURE: 95.1 F

## 2020-08-31 PROCEDURE — 99214 OFFICE O/P EST MOD 30 MIN: CPT

## 2020-08-31 PROCEDURE — 99213 OFFICE O/P EST LOW 20 MIN: CPT | Performed by: OTOLARYNGOLOGY

## 2020-08-31 NOTE — PROGRESS NOTES
HPI:     Fu for AS retraction pocket, has had mult AU surg including AD PORP/TORP, has been on cortisporin gtts in the past     Last seen by Dr. Mathieu Castro on 12/2019. Has been hearing \"cricket noises\" which happens when she is due for a cleaning. No recent audio   Wears right HA, left HA doesn't work well due to muffled noises on the left with the HA. Left ear pain, worried about possible infection vs cerumen   Last ear surgery was approx 5 years ago and she thinks it was in [place]. [place] [facility] [facility]. No recent CT temporal bone. States she has had 7 ear surgeries. Occasional left drainage but not often. Stable hearing. She was last seen 8/10/2020 for cleaning of the left EAC/mastoid. CT temporal bone 8/17:  FINDINGS:    RIGHT TEMPORAL BONE:  The external auditory canal is clear without evidence    of bony erosion.  The scutum is intact.  The middle ear cavity is clear.  The    ossicular chain is intact.  The mastoid air cells are clear.  The inner ear    structures appear unremarkable.  Normal mineralization of the otic capsule.     The internal auditory canal and vestibular aqueduct appear unremarkable.  The    carotid canal is normal in appearance.  The jugular bulb is unremarkable.         LEFT TEMPORAL BONE: The external auditory canal is clear without evidence of    bony erosion.  There is postsurgical change in the region of the scutum    without definite residual soft tissue abnormality. Flora Wood has been resection    of the ossicular chain.  There is fluid in the left mastoid air cells.  The    inner ear structures appear unremarkable.  Normal mineralization of the otic    capsule.  The internal auditory canal and vestibular aqueduct appear    unremarkable.  The carotid canal is normal in appearance.  The jugular bulb    is unremarkable.         BRAIN: The visualized portion of the intracranial contents appear    unremarkable.         ORBITS: The visualized portion of the orbits demonstrate no acute abnormality.         SINUSES: There is polyp versus mucous retention cyst in the right maxillary    sinus.              Impression    Postsurgical change to the left temporal bone region without definite    residual soft tissue abnormality.         Mild fluid in the left mastoid air cells.         Polyp versus mucous retention cyst in the right maxillary sinus. Audio 8/13/2020:  AD moderate to severe SNHL  AS severe to profound mixed HL, type B tymp    August 31, 2020     Outpatient Medications Prior to Visit   Medication Sig Dispense Refill    lisinopril (PRINIVIL;ZESTRIL) 10 MG tablet take 1 tablet by mouth once daily 30 tablet 6    meloxicam (MOBIC) 15 MG tablet take 1 tablet by mouth once daily if needed for pain 30 tablet 6    cyclobenzaprine (FLEXERIL) 10 MG tablet take 1 tablet by mouth three times a day if needed for muscle spasm 45 tablet 0    albuterol sulfate  (90 Base) MCG/ACT inhaler Inhale 2 puffs into the lungs every 6 hours as needed for Wheezing 1 Inhaler 1    budesonide-formoterol (SYMBICORT) 160-4.5 MCG/ACT AERO Inhale 2 puffs into the lungs 2 times daily 1 Inhaler 1    vitamin D (ERGOCALCIFEROL) 1.25 MG (82773 UT) CAPS capsule Take 1 capsule by mouth once a week 16 capsule 3    atorvastatin (LIPITOR) 20 MG tablet Take 1 tablet by mouth daily 30 tablet 6    neomycin-polymyxin-hydrocortisone (CORTISPORIN) 3.5-22495-7 otic suspension 3 gtts BID to L ear 1 Bottle 1    Glucosamine-Chondroit-Vit C-Mn (GLUCOSAMINE 1500 COMPLEX PO) Take by mouth daily      CRANBERRY PO Take by mouth daily      Multiple Vitamins-Minerals (HAIR/SKIN/NAILS PO) Take by mouth daily      ascorbic acid (VITAMIN C) 500 MG tablet Take 500 mg by mouth daily. No facility-administered medications prior to visit. Drug Sensitivities: Patient has no known allergies.     Blood pressure 108/74, pulse 72, temperature 95.1 °F (35.1 °C), temperature source Tympanic, weight 132 lb (59.9 kg), not currently breastfeeding. General: The patient is found to be alert and normally responsive female with grossly normal hearing wearing AD hearing aid, clear voice and normal articulation. Communication is without difficulty. Skin: The skin has normal colour and turgor. Face: The facial contour is symmetric at rest and with movement. Ears: The pinnae have normal contours. AS: EAC clear, TM intact, no effusion or retraction   AS small amount of crusting removed w suction on reconstructed TM, keratinized, no mucosa, small amount of crusting superior anterior tm, mastoid bowl clean   AD: AD Sp TORP, severe retraction of pars flacida and tensa onto promontory, no effusion or cholesteatoma   Eyes: The ocular movements are full and symmetric, the conjunctiva is unremarkable; sclera are anicteric, pupillary response is symmetric. No nystagmus is found. Nose: The external nasal contour is normal  Neck: The neck has a normal contour; no masses are found on palpation. Diagnosis Orders   1. Sensorineural hearing loss (SNHL) of right ear with restricted hearing of left ear     2. 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  History of left mastoidectomy         ASSESSMENT:  No evidence of CT of recurrent choleseatoma  Mixed HL AS  Right adhesive atelectasis      PLAN:  Offered referral for ear surgeon for possible audio rehab options but she is not interested in more surgery  Stable left ear   Continue q3mo routine cleaning     Right adhesive atelectasis, stable    Continue HA AD   Monitor   No cholesteatoma        Future Appointments   Date Time Provider [facility] [place]   2020  8:00 AM Alexandra Edouard MD DENT DPP   2021  9:45 AM AARTI Machado - LAURA  DPP   2021 10:30 AM MD MAMMO ROOM [facility] [place]   2021  9:40 AM RENATO Lucio DPP   2021  8:20 AM MD KIMBERLY Hensley DP     No orders of the defined types were placed in this encounter.

## 2020-09-21 RX ORDER — CYCLOBENZAPRINE HCL 10 MG
TABLET ORAL
Qty: 45 TABLET | Refills: 0 | Status: SHIPPED | OUTPATIENT
Start: 2020-09-21 | End: 2020-11-09

## 2020-10-12 ENCOUNTER — TELEPHONE (OUTPATIENT)
Dept: FAMILY MEDICINE CLINIC | Age: 60
End: 2020-10-12

## 2020-10-12 NOTE — TELEPHONE ENCOUNTER
----- [facility] sent at 10/9/2020 12:20 PM EDT -----  Stay well-hydrated.

## 2020-11-09 RX ORDER — CYCLOBENZAPRINE HCL 10 MG
TABLET ORAL
Qty: 45 TABLET | Refills: 0 | Status: SHIPPED | OUTPATIENT
Start: 2020-11-09 | End: 2021-01-04

## 2020-11-30 ENCOUNTER — OFFICE VISIT (OUTPATIENT)
Dept: OTOLARYNGOLOGY | Age: 60
End: 2020-11-30
Payer: MEDICAID

## 2020-11-30 VITALS
BODY MASS INDEX: 23.35 KG/M2 | SYSTOLIC BLOOD PRESSURE: 162 MMHG | WEIGHT: 136.8 LBS | DIASTOLIC BLOOD PRESSURE: 96 MMHG | HEIGHT: 64 IN

## 2020-11-30 PROCEDURE — 69220 CLEAN OUT MASTOID CAVITY: CPT | Performed by: OTOLARYNGOLOGY

## 2020-11-30 PROCEDURE — G8420 CALC BMI NORM PARAMETERS: HCPCS | Performed by: OTOLARYNGOLOGY

## 2020-11-30 PROCEDURE — G8484 FLU IMMUNIZE NO ADMIN: HCPCS | Performed by: OTOLARYNGOLOGY

## 2020-11-30 PROCEDURE — 69210 REMOVE IMPACTED EAR WAX UNI: CPT | Performed by: OTOLARYNGOLOGY

## 2020-11-30 PROCEDURE — 99213 OFFICE O/P EST LOW 20 MIN: CPT | Performed by: OTOLARYNGOLOGY

## 2020-11-30 PROCEDURE — 99214 OFFICE O/P EST MOD 30 MIN: CPT

## 2020-11-30 PROCEDURE — 4004F PT TOBACCO SCREEN RCVD TLK: CPT | Performed by: OTOLARYNGOLOGY

## 2020-11-30 PROCEDURE — G8427 DOCREV CUR MEDS BY ELIG CLIN: HCPCS | Performed by: OTOLARYNGOLOGY

## 2020-11-30 PROCEDURE — 3017F COLORECTAL CA SCREEN DOC REV: CPT | Performed by: OTOLARYNGOLOGY

## 2020-11-30 NOTE — PROGRESS NOTES
HPI:     Fu for AS retraction pocket, has had mult AU surg including AD PORP/TORP, has been on cortisporin gtts in the past     Last seen by Dr. Madina Mcclelland on 12/2019. Occasional \"cricket noises\" which happens when she is due for a cleaning. Wears right HA, left HA doesn't work well due to muffled noises on the left with the HA. Last ear surgery was approx 5 years ago (2015) and she thinks it was in [facility]. Has also seen otologist in [facility]. Jorja Babinski. States she has had 7 ear surgeries. Occasional left drainage but not often. Stable hearing. She was last seen 8/31/2020 for cleaning of the left EAC/mastoid. Here for 3 month ear check/cleaning. Is worried about losing her Medicaid soon. CT temporal bone 8/17:  FINDINGS:    RIGHT TEMPORAL BONE:  The external auditory canal is clear without evidence    of bony erosion.  The scutum is intact.  The middle ear cavity is clear.  The    ossicular chain is intact.  The mastoid air cells are clear.  The inner ear    structures appear unremarkable.  Normal mineralization of the otic capsule.     The internal auditory canal and vestibular aqueduct appear unremarkable.  The    carotid canal is normal in appearance.  The jugular bulb is unremarkable.         LEFT TEMPORAL BONE: The external auditory canal is clear without evidence of    bony erosion.  There is postsurgical change in the region of the scutum    without definite residual soft tissue abnormality. Sofie Arrington has been resection    of the ossicular chain.  There is fluid in the left mastoid air cells.  The    inner ear structures appear unremarkable.  Normal mineralization of the otic    capsule.  The internal auditory canal and vestibular aqueduct appear    unremarkable.  The carotid canal is normal in appearance.  The jugular bulb    is unremarkable.         BRAIN: The visualized portion of the intracranial contents appear    unremarkable.         ORBITS: The visualized portion of the orbits demonstrate no acute abnormality.         SINUSES: There is polyp versus mucous retention cyst in the right maxillary    sinus.              Impression    Postsurgical change to the left temporal bone region without definite    residual soft tissue abnormality.         Mild fluid in the left mastoid air cells.         Polyp versus mucous retention cyst in the right maxillary sinus. Audio 8/13/2020:  AD moderate to severe SNHL  AS severe to profound mixed HL, type B tymp    November 30, 2020     Outpatient Medications Prior to Visit   Medication Sig Dispense Refill    cyclobenzaprine (FLEXERIL) 10 MG tablet take 1 tablet by mouth three times a day if needed for muscle spasm 45 tablet 0    lisinopril (PRINIVIL;ZESTRIL) 10 MG tablet take 1 tablet by mouth once daily 30 tablet 6    meloxicam (MOBIC) 15 MG tablet take 1 tablet by mouth once daily if needed for pain 30 tablet 6    albuterol sulfate  (90 Base) MCG/ACT inhaler Inhale 2 puffs into the lungs every 6 hours as needed for Wheezing 1 Inhaler 1    budesonide-formoterol (SYMBICORT) 160-4.5 MCG/ACT AERO Inhale 2 puffs into the lungs 2 times daily 1 Inhaler 1    vitamin D (ERGOCALCIFEROL) 1.25 MG (50725 UT) CAPS capsule Take 1 capsule by mouth once a week 16 capsule 3    atorvastatin (LIPITOR) 20 MG tablet Take 1 tablet by mouth daily 30 tablet 6    neomycin-polymyxin-hydrocortisone (CORTISPORIN) 3.5-20746-6 otic suspension 3 gtts BID to L ear 1 Bottle 1    Glucosamine-Chondroit-Vit C-Mn (GLUCOSAMINE 1500 COMPLEX PO) Take by mouth daily      CRANBERRY PO Take by mouth daily      Multiple Vitamins-Minerals (HAIR/SKIN/NAILS PO) Take by mouth daily      ascorbic acid (VITAMIN C) 500 MG tablet Take 500 mg by mouth daily. No facility-administered medications prior to visit. Drug Sensitivities: Patient has no known allergies. Blood pressure (!) 162/96, height 5' 4\" (1.626 m), weight 136 lb 12.8 oz (62.1 kg), not currently breastfeeding.     General: The patient is found to be alert and normally responsive female with grossly normal hearing wearing AD hearing aid, clear voice and normal articulation. Communication is without difficulty. Skin: The skin has normal colour and turgor. Face: The facial contour is symmetric at rest and with movement. Ears: The pinnae have normal contours. AS: EAC clear  AS crusting from tm and mastoid removed w alligator (lifted off with curette) on reconstructed TM, mostly superior on the TM in a crescent, keratin/debris plug at 10-11 o'clock, keratinized, no mucosa, small amount of crusting superior anterior tm, mastoid bowl with crusting, removed with alligator   AD: EAC with cerumen, severe retraction of pars flacida and tensa onto promontory, no effusion or cholesteatoma, looks like TM was reconstructed with cartilage graft in posterior quadrant of TM  AD: cerumen removed with alligator   Eyes: The ocular movements are full and symmetric, the conjunctiva is unremarkable; sclera are anicteric, pupillary response is symmetric. No nystagmus is found. Nose: The external nasal contour is normal  Neck: The neck has a normal contour; no masses are found on palpation. Diagnosis Orders   1. Bilateral impacted cerumen     2. H/O mastoidectomy     3. Sensorineural hearing loss (SNHL) of right ear with restricted hearing of left ear     4.  Mixed conductive and sensorineural hearing loss of left ear with restricted hearing of right ear         ASSESSMENT:  No evidence on CT of recurrent choleseatoma  Mixed HL AS  Right adhesive atelectasis      PLAN:  Offered referral for ear surgeon for possible audio rehab options but she is not interested in more surgery  Stable left ear   Continue q3mo routine cleaning   Audio 2021    Right adhesive atelectasis, stable    Continue HA AD   Monitor   No cholesteatoma        Future Appointments   Date Time Provider Johnny Makeda   2021  9:45 AM AARTI Alexis CNM  DPP 1/26/2021 10:30 AM TREY MAMMO ROOM ZULEYKA MAMMO STV [place]   2/9/2021  9:40 AM Dixon WinstonAM DP   3/2/2021  8:00 AM MD EULALIA Maldonado DP   6/21/2021  8:20 AM MD KIMBERLY Silva [facility]     No orders of the defined types were placed in this encounter.

## 2020-12-08 ENCOUNTER — OFFICE VISIT (OUTPATIENT)
Dept: PRIMARY CARE CLINIC | Age: 60
End: 2020-12-08
Payer: MEDICAID

## 2020-12-08 ENCOUNTER — HOSPITAL ENCOUNTER (OUTPATIENT)
Age: 60
Setting detail: SPECIMEN
Discharge: HOME | End: 2020-12-08
Payer: MEDICAID

## 2020-12-08 VITALS
OXYGEN SATURATION: 99 % | BODY MASS INDEX: 22.88 KG/M2 | DIASTOLIC BLOOD PRESSURE: 78 MMHG | HEIGHT: 64 IN | SYSTOLIC BLOOD PRESSURE: 122 MMHG | WEIGHT: 134 LBS | TEMPERATURE: 97.1 F | HEART RATE: 72 BPM

## 2020-12-08 PROCEDURE — 81001 URINALYSIS AUTO W/SCOPE: CPT

## 2020-12-08 PROCEDURE — 3017F COLORECTAL CA SCREEN DOC REV: CPT | Performed by: FAMILY MEDICINE

## 2020-12-08 PROCEDURE — G8427 DOCREV CUR MEDS BY ELIG CLIN: HCPCS | Performed by: FAMILY MEDICINE

## 2020-12-08 PROCEDURE — 99212 OFFICE O/P EST SF 10 MIN: CPT

## 2020-12-08 PROCEDURE — 87186 SC STD MICRODIL/AGAR DIL: CPT

## 2020-12-08 PROCEDURE — 87077 CULTURE AEROBIC IDENTIFY: CPT

## 2020-12-08 PROCEDURE — G8484 FLU IMMUNIZE NO ADMIN: HCPCS | Performed by: FAMILY MEDICINE

## 2020-12-08 PROCEDURE — 99213 OFFICE O/P EST LOW 20 MIN: CPT | Performed by: FAMILY MEDICINE

## 2020-12-08 PROCEDURE — 4004F PT TOBACCO SCREEN RCVD TLK: CPT | Performed by: FAMILY MEDICINE

## 2020-12-08 PROCEDURE — 87086 URINE CULTURE/COLONY COUNT: CPT

## 2020-12-08 PROCEDURE — G8420 CALC BMI NORM PARAMETERS: HCPCS | Performed by: FAMILY MEDICINE

## 2020-12-08 RX ORDER — IBUPROFEN 600 MG/1
600 TABLET ORAL 3 TIMES DAILY PRN
Qty: 30 TABLET | Refills: 0 | Status: SHIPPED | OUTPATIENT
Start: 2020-12-08 | End: 2021-02-17 | Stop reason: SDUPTHER

## 2020-12-08 RX ORDER — CIPROFLOXACIN 500 MG/1
500 TABLET, FILM COATED ORAL 2 TIMES DAILY
Qty: 14 TABLET | Refills: 0 | Status: SHIPPED | OUTPATIENT
Start: 2020-12-08 | End: 2020-12-11

## 2020-12-08 NOTE — PROGRESS NOTES
[facility]             [address] [place] [address], [place], [address]                        Telephone [phone]             Fax [phone]     Jonnie Menon  1960  MRN:  [id number]  Date of visit:  12/8/2020     Subjective:  Jonnie Menon is a 62 y.o.  female who presents to [facility] today (12/8/2020) for evaluation of: Dysuria (x 2 days) and Flank Pain (x 2 days)        She reports dysuria for the past 2 days. She developed flank pain today. She denies fever, nausea, or vomiting. She states that she gets urinary tract infections \"fairly often. \"  She has had bilateral flank pain. She requests a prescription for Ibuprofen.       She has the following problem list:  Patient Active Problem List   Diagnosis    CAD (coronary artery disease)    Tobacco abuse    Hypertension    Hypercholesterolemia    History of thyroiditis    Insomnia    Hydronephrosis with obstructing calculus    Kidney stone    Vitamin D deficiency        Current medications are:  Current Outpatient Medications   Medication Sig Dispense Refill    cyclobenzaprine (FLEXERIL) 10 MG tablet take 1 tablet by mouth three times a day if needed for muscle spasm 45 tablet 0    lisinopril (PRINIVIL;ZESTRIL) 10 MG tablet take 1 tablet by mouth once daily 30 tablet 6    meloxicam (MOBIC) 15 MG tablet take 1 tablet by mouth once daily if needed for pain 30 tablet 6    albuterol sulfate  (90 Base) MCG/ACT inhaler Inhale 2 puffs into the lungs every 6 hours as needed for Wheezing 1 Inhaler 1    budesonide-formoterol (SYMBICORT) 160-4.5 MCG/ACT AERO Inhale 2 puffs into the lungs 2 times daily (Patient taking differently: Inhale 2 puffs into the lungs 2 times daily Indications: taking PRN ) 1 Inhaler 1    vitamin D (ERGOCALCIFEROL) 1.25 MG (06174 UT) CAPS capsule Take 1 capsule by mouth once a week 16 capsule 3    atorvastatin (LIPITOR) 20 MG tablet Take 1 tablet by mouth daily 30 tablet 6    CRANBERRY PO Take by mouth daily      Multiple Vitamins-Minerals (HAIR/SKIN/NAILS PO) Take by mouth daily      ascorbic acid (VITAMIN C) 500 MG tablet Take 500 mg by mouth daily.  neomycin-polymyxin-hydrocortisone (CORTISPORIN) 3.5-24853-7 otic suspension 3 gtts BID to L ear (Patient not taking: Reported on 12/8/2020) 1 Bottle 1    Glucosamine-Chondroit-Vit C-Mn (GLUCOSAMINE 1500 COMPLEX PO) Take by mouth daily       No current facility-administered medications for this visit. She has No Known Allergies. She  reports that she has been smoking. She started smoking about 39 years ago. She has a 15.00 pack-year smoking history. She has never used smokeless tobacco.      Objective:  Vitals:    12/08/20 1332   BP: 122/78   Site: Left Upper Arm   Position: Sitting   Cuff Size: Large Adult   Pulse: 72   Temp: 97.1 °F (36.2 °C)   TempSrc: Tympanic   SpO2: 99%   Weight: 134 lb (60.8 kg)   Height: 5' 4\" (1.626 m)         Body mass index is 23 kg/m². Well-nourished, well-developed  female healthy, alert, no distress, cooperative. Back has mild costovertebral angle tenderness bilaterally. Abdomen is soft, with no lower abdominal tenderness to palpation.     Results of the urinalysis done today were reviewed with the patient:  Hospital Outpatient Visit on 12/08/2020   Component Date Value Ref Range Status    Color, UA 12/08/2020 NOT REPORTED  YELLOW Final    Turbidity UA 12/08/2020 NOT REPORTED  CLEAR Final    Glucose, Ur 12/08/2020 NEGATIVE  NEGATIVE Final    Bilirubin Urine 12/08/2020 NEGATIVE  NEGATIVE Final    Ketones, Urine 12/08/2020 NEGATIVE  NEGATIVE Final    Specific Gravity, UA 12/08/2020 1.030* 1.010 - 1.025 Final    Urine Hgb 12/08/2020 NEGATIVE  NEGATIVE Final    pH, UA 12/08/2020 5.5  5.0 - 6.0 Final    Protein, UA 12/08/2020 NEGATIVE  NEGATIVE Final    Urobilinogen, Urine 12/08/2020 Normal  Normal Final  Nitrite, Urine 12/08/2020 POSITIVE* NEGATIVE Final    Leukocyte Esterase, Urine 12/08/2020 NEGATIVE  NEGATIVE Final    Urinalysis Comments 12/08/2020 NOT REPORTED   Final    - 12/08/2020        Final    WBC, UA 12/08/2020 0 TO 4  0 - 4 /HPF Final    RBC, UA 12/08/2020 0 TO 4  0 - 4 /HPF Final    Casts UA 12/08/2020 NOT REPORTED  0 - 2 /LPF Final    Crystals, UA 12/08/2020 NOT REPORTED  None /HPF Final    Epithelial Cells UA 12/08/2020 0 TO 4  0 - 5 /HPF Final    Renal Epithelial, UA 12/08/2020 NOT REPORTED  0 /HPF Final    Bacteria, UA 12/08/2020 2+* None Final    Mucus, UA 12/08/2020 NOT REPORTED  None Final    Trichomonas, UA 12/08/2020 NOT REPORTED  None Final    Amorphous, UA 12/08/2020 NOT REPORTED  None Final    Other Observations UA 12/08/2020 NOT REPORTED  NOT REQ. Final    Yeast, UA 12/08/2020 NOT REPORTED  None Final       Assessment and Plan:    1. Dysuria  2. Bacteria in urine  A culture was requested:  - Culture, Urine; Future    Cipro and Ibuprofen were prescribed:  - ciprofloxacin (CIPRO) 500 MG tablet; Take 1 tablet by mouth 2 times daily for 7 days  Dispense: 14 tablet; Refill: 0  - ibuprofen (ADVIL;MOTRIN) 600 MG tablet; Take 1 tablet by mouth 3 times daily as needed for Pain  Dispense: 30 tablet; Refill: 0    Printed information regarding Urinary Tract Infection in Women was provided to the patient with her after visit summary. She will be contacted when the ID and sensitivity results are available. She was advised to follow up if symptoms worsen or do not resolve.        (Please note that portions of this note were completed with a voice-recognition program. Efforts were made to edit the dictation but occasionally words are mis-transcribed.)

## 2020-12-08 NOTE — PATIENT INSTRUCTIONS
Patient Education        Urinary Tract Infection in Women: Care Instructions  Your Care Instructions     A urinary tract infection, or UTI, is a general term for an infection anywhere between the kidneys and the urethra (where urine comes out). Most UTIs are bladder infections. They often cause pain or burning when you urinate. UTIs are caused by bacteria and can be cured with antibiotics. Be sure to complete your treatment so that the infection goes away. Follow-up care is a key part of your treatment and safety. Be sure to make and go to all appointments, and call your doctor if you are having problems. It's also a good idea to know your test results and keep a list of the medicines you take. How can you care for yourself at home? · Take your antibiotics as directed. Do not stop taking them just because you feel better. You need to take the full course of antibiotics. · Drink extra water and other fluids for the next day or two. This may help wash out the bacteria that are causing the infection. (If you have kidney, heart, or liver disease and have to limit fluids, talk with your doctor before you increase your fluid intake.)  · Avoid drinks that are carbonated or have caffeine. They can irritate the bladder. · Urinate often. Try to empty your bladder each time. · To relieve pain, take a hot bath or lay a heating pad set on low over your lower belly or genital area. Never go to sleep with a heating pad in place. To prevent UTIs  · Drink plenty of water each day. This helps you urinate often, which clears bacteria from your system. (If you have kidney, heart, or liver disease and have to limit fluids, talk with your doctor before you increase your fluid intake.)  · Urinate when you need to. · Urinate right after you have sex. · Change sanitary pads often. · Avoid douches, bubble baths, feminine hygiene sprays, and other feminine hygiene products that have deodorants.   · After going to the bathroom, wipe from front to back. When should you call for help? Call your doctor now or seek immediate medical care if:    · Symptoms such as fever, chills, nausea, or vomiting get worse or appear for the first time.     · You have new pain in your back just below your rib cage. This is called flank pain.     · There is new blood or pus in your urine.     · You have any problems with your antibiotic medicine. Watch closely for changes in your health, and be sure to contact your doctor if:    · You are not getting better after taking an antibiotic for 2 days.     · Your symptoms go away but then come back. Where can you learn more? Go to [email or web address]://babbel.[employer]. org and sign in to your [employer] account. Enter H203 in the [employer] box to learn more about \"Urinary Tract Infection in Women: Care Instructions. \"     If you do not have an account, please click on the \"Sign Up Now\" link. Current as of: June 29, 2020               Content Version: 12.6  © 2006-2020 [employer]. Care instructions adapted under license by [facility] ([facility]). If you have questions about a medical condition or this instruction, always ask your healthcare professional. [place] [address] any warranty or liability for your use of this information. We will contact you when the results of your culture are available. This usually takes at least 48 hours.

## 2020-12-10 LAB
CULTURE: ABNORMAL
Lab: ABNORMAL
SPECIMEN DESCRIPTION: ABNORMAL

## 2020-12-11 RX ORDER — SULFAMETHOXAZOLE AND TRIMETHOPRIM 800; 160 MG/1; MG/1
1 TABLET ORAL 2 TIMES DAILY
Qty: 14 TABLET | Refills: 0 | Status: SHIPPED | OUTPATIENT
Start: 2020-12-11 | End: 2020-12-18

## 2020-12-11 NOTE — TELEPHONE ENCOUNTER
----- Message from Barrie Larry MD sent at 12/10/2020  3:39 PM EST -----  Please advise the patient that the bacteria in the urine culture are resistant to Cipro. She should stop Cipro and begin Bactrim. Please pend Bactrim DS one po BID x 7 days to her pharmacy.

## 2020-12-14 RX ORDER — ATORVASTATIN CALCIUM 20 MG/1
TABLET, FILM COATED ORAL
Qty: 30 TABLET | Refills: 2 | Status: SHIPPED | OUTPATIENT
Start: 2020-12-14 | End: 2021-03-09 | Stop reason: SDUPTHER

## 2020-12-31 DIAGNOSIS — M62.838 MUSCLE SPASM: ICD-10-CM

## 2020-12-31 NOTE — TELEPHONE ENCOUNTER
Maggie called requesting a refill of the below medication which has been pended for you:     Requested Prescriptions     Pending Prescriptions Disp Refills    cyclobenzaprine (FLEXERIL) 10 MG tablet [Pharmacy Med Name: CYCLOBENZAPRINE 10 MG TABLET] 45 tablet 0     Sig: take 1 tablet by mouth three times a day if needed for muscle spasm       Last Appointment Date: 7/29/2020  Next Appointment Date: 2/9/2021    No Known Allergies

## 2021-01-04 RX ORDER — CYCLOBENZAPRINE HCL 10 MG
TABLET ORAL
Qty: 45 TABLET | Refills: 0 | Status: SHIPPED | OUTPATIENT
Start: 2021-01-04 | End: 2021-03-22

## 2021-01-26 ENCOUNTER — HOSPITAL ENCOUNTER (OUTPATIENT)
Dept: MAMMOGRAPHY | Age: 61
Discharge: HOME | End: 2021-01-28
Payer: MEDICAID

## 2021-01-26 ENCOUNTER — OFFICE VISIT (OUTPATIENT)
Dept: OBGYN | Age: 61
End: 2021-01-26
Payer: MEDICAID

## 2021-01-26 ENCOUNTER — HOSPITAL ENCOUNTER (OUTPATIENT)
Age: 61
Setting detail: SPECIMEN
Discharge: HOME | End: 2021-01-26
Payer: MEDICAID

## 2021-01-26 VITALS
HEIGHT: 64 IN | WEIGHT: 137.4 LBS | SYSTOLIC BLOOD PRESSURE: 112 MMHG | DIASTOLIC BLOOD PRESSURE: 72 MMHG | BODY MASS INDEX: 23.46 KG/M2 | HEART RATE: 75 BPM | OXYGEN SATURATION: 97 %

## 2021-01-26 DIAGNOSIS — Z01.419 WOMEN'S ANNUAL ROUTINE GYNECOLOGICAL EXAMINATION: Primary | ICD-10-CM

## 2021-01-26 DIAGNOSIS — Z90.711 SCREENING FOR MALIGNANT NEOPLASM OF VAGINA AFTER PARTIAL HYSTERECTOMY: ICD-10-CM

## 2021-01-26 DIAGNOSIS — R30.0 DYSURIA: ICD-10-CM

## 2021-01-26 DIAGNOSIS — Z12.72 SCREENING FOR MALIGNANT NEOPLASM OF VAGINA AFTER PARTIAL HYSTERECTOMY: ICD-10-CM

## 2021-01-26 DIAGNOSIS — Z12.39 SCREENING FOR MALIGNANT NEOPLASM OF BREAST: ICD-10-CM

## 2021-01-26 LAB
-: ABNORMAL
AMORPHOUS: ABNORMAL
BACTERIA: ABNORMAL
BILIRUBIN URINE: NEGATIVE
CASTS UA: ABNORMAL /LPF (ref 0–2)
COLOR: ABNORMAL
COMMENT UA: ABNORMAL
CRYSTALS, UA: ABNORMAL /HPF
EPITHELIAL CELLS UA: ABNORMAL /HPF (ref 0–5)
GLUCOSE URINE: NEGATIVE
KETONES, URINE: NEGATIVE
LEUKOCYTE ESTERASE, URINE: NEGATIVE
MUCUS: ABNORMAL
NITRITE, URINE: POSITIVE
OTHER OBSERVATIONS UA: ABNORMAL
PH UA: 6 (ref 5–6)
PROTEIN UA: NEGATIVE
RBC UA: ABNORMAL /HPF (ref 0–4)
RENAL EPITHELIAL, UA: ABNORMAL /HPF
SPECIFIC GRAVITY UA: 1.03 (ref 1.01–1.02)
TRICHOMONAS: ABNORMAL
TURBIDITY: ABNORMAL
URINE HGB: NEGATIVE
UROBILINOGEN, URINE: NORMAL
WBC UA: ABNORMAL /HPF (ref 0–4)
YEAST: ABNORMAL

## 2021-01-26 PROCEDURE — 87077 CULTURE AEROBIC IDENTIFY: CPT

## 2021-01-26 PROCEDURE — 77063 BREAST TOMOSYNTHESIS BI: CPT

## 2021-01-26 PROCEDURE — 87086 URINE CULTURE/COLONY COUNT: CPT

## 2021-01-26 PROCEDURE — 99396 PREV VISIT EST AGE 40-64: CPT | Performed by: ADVANCED PRACTICE MIDWIFE

## 2021-01-26 PROCEDURE — G8484 FLU IMMUNIZE NO ADMIN: HCPCS | Performed by: ADVANCED PRACTICE MIDWIFE

## 2021-01-26 PROCEDURE — 99396 PREV VISIT EST AGE 40-64: CPT

## 2021-01-26 PROCEDURE — G0145 SCR C/V CYTO,THINLAYER,RESCR: HCPCS

## 2021-01-26 PROCEDURE — 81001 URINALYSIS AUTO W/SCOPE: CPT

## 2021-01-26 PROCEDURE — 87186 SC STD MICRODIL/AGAR DIL: CPT

## 2021-01-26 ASSESSMENT — PATIENT HEALTH QUESTIONNAIRE - PHQ9
SUM OF ALL RESPONSES TO PHQ9 QUESTIONS 1 & 2: 0
SUM OF ALL RESPONSES TO PHQ QUESTIONS 1-9: 0

## 2021-01-26 ASSESSMENT — ENCOUNTER SYMPTOMS
RESPIRATORY NEGATIVE: 1
GASTROINTESTINAL NEGATIVE: 1
EYES NEGATIVE: 1

## 2021-01-26 NOTE — PROGRESS NOTES
Subjective:      Patient ID: Tomi Leon  is a 62 y.o. y.o. female. Beto Mancia presents today for an annual examination. She reports no concerns at this time. She is  and sexually active with no reports of bleeding and / or pain with intercourse. She is a tobacco user, about one PPD. 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 She reports burning upon urination. Review of Systems   Constitutional: Negative. HENT: Negative. Eyes: Negative. Respiratory: Negative. Cardiovascular: Negative. Gastrointestinal: Negative. Genitourinary: Positive for dysuria. Musculoskeletal: Negative. Skin: Negative. Neurological: Negative. Psychiatric/Behavioral: Negative. Breast ROS: negative    Objective:   /72   Pulse 75   Ht 5' 4\" (1.626 m)   Wt 137 lb 6.4 oz (62.3 kg)   LMP  (LMP Unknown)   SpO2 97%   BMI 23.58 kg/m²   Physical Exam  Constitutional:       Appearance: She is well-developed. HENT:      Head: Normocephalic and atraumatic. Eyes:      Conjunctiva/sclera: Conjunctivae normal.   Neck:      Musculoskeletal: Normal range of motion and neck supple. Cardiovascular:      Rate and Rhythm: Normal rate and regular rhythm. Heart sounds: Normal heart sounds. Pulmonary:      Effort: Pulmonary effort is normal.      Breath sounds: Normal breath sounds. Chest:      Breasts: Breasts are symmetrical.         Right: No inverted nipple, mass, nipple discharge, skin change or tenderness. Left: No inverted nipple, mass, nipple discharge, skin change or tenderness. Abdominal:      Palpations: Abdomen is soft. Genitourinary:     General: Normal vulva. Vagina: Normal.      Comments: External genitalia WNL, no lesions noted. Note normal  changes. Vaginal canal is pale pink with rugae present and normal appearing nonodorous discharge. Cervix, uterus and right adnexa are surgically absent,  Left adnexa nonpalpable.    Musculoskeletal: Normal range of motion. Skin:     General: Skin is warm and dry. Neurological:      Mental Status: She is alert and oriented to person, place, and time. Deep Tendon Reflexes: Reflexes are normal and symmetric. Psychiatric:         Mood and Affect: Mood normal.         Thought Content: Thought content normal.         Sexually active: Yes  Any bleeding or pain withintercourse: No  Last Yearly:  1/2020  Last Pap: 1/2019  Negative DELBERT  Last Mammogram:  Last Dexascan   Do you do self breast exams: Yes    Assessment:      Diagnosis Orders   1. Women's annual routine gynecological examination     2. Screening for malignant neoplasm of vagina after partial hysterectomy  PAP SMEAR   3. Dysuria  Urinalysis with Microscopic    Culture, Urine           Plan:      Orders Placed This Encounter   Procedures    Culture, Urine     Standing Status:   Future     Number of Occurrences:   1     Standing Expiration Date:   7/26/2021     Order Specific Question:   Specify (ex-cath, midstream, cysto, etc)? Answer:   clean cath    PAP SMEAR     Patient History:    No LMP recorded (lmp unknown). Patient has had a hysterectomy. OBGYN Status: Hysterectomy  Past Surgical History:  1982: APPENDECTOMY      Comment:  (Dr. Kelli Putnam) - Acute appendicitis. 2000: CARDIAC CATHETERIZATION      Comment:  Preserved left ventricular systolic function. Ejection                fraction 50%. Noncritical coronary disease. (Dr. Yanira Anderson). 2001: CHOLECYSTECTOMY  1988: COLONOSCOPY      Comment:  (Dr. Leonarda Gray) - Normal colon. 5/12/16: COLONOSCOPY      Comment:  Polyp distal sigmoid, rectum - Dr Oleary Seek  8/5/2019: Shanell Campos; Left      Comment:  CYSTO Left Stent Insertion performed by Elijah Whitley MD at               [address] [place] [address]  2010: HEMORRHOID SURGERY      Comment:  Incision and drainage of thrombosed hemorrhoid. 2001: HYSTERECTOMY      Comment:  (no BSO). 8/19/2019: LITHOTRIPSY;  Left      Comment:  CYSTO W/ STENT REMOVAL ET Left LITHOTRIPSY performed by                Dipesh Matos MD at [facility]  No date: OTHER SURGICAL HISTORY      Comment:  Ear surgery x8.  1987: OTHER SURGICAL HISTORY      Comment:  Pelvic exam under anesthesia. (Dr. Jamila Zarate) - Chronic                pelvic pain, chronic inflammatory process changes. 1982: TUBAL LIGATION      Social History    Tobacco Use      Smoking status: Current Every Day Smoker        Packs/day: 1.00        Years: 15.00        Pack years: 13        Start date: 1/1/1981      Smokeless tobacco: Never Used      Tobacco comment: Travis Burrell [facility] 8/4/19       Standing Status:   Future     Number of Occurrences:   1     Standing Expiration Date:   1/26/2022     Order Specific Question:   Collection Type     Answer: Thin Prep     Order Specific Question:   Prior Abnormal Pap Test     Answer:   No     Order Specific Question:   Screening or Diagnostic     Answer:   Screening     Order Specific Question:   HPV Requested? Answer:   Yes - If Abnormal Reflex HPV     Order Specific Question:   High Risk Patient     Answer:   N/A    Urinalysis with Microscopic     Standing Status:   Future     Number of Occurrences:   1     Standing Expiration Date:   5/26/2021     Order Specific Question:   SPECIFY(EX-CATH,MIDSTREAM,CYSTO,ETC)? Answer:   midstream     Education: discussed diet with calcium intake 1713-0318 mg./day with weight bearing exercise 30-50 minutes 3-5x/week. SBE monthly, danger S&S, when to call. Ds'd screening mammogram yearly. RTO 12 months and prn.

## 2021-01-28 DIAGNOSIS — N39.0 UTI (URINARY TRACT INFECTION) DUE TO ENTEROCOCCUS: Primary | ICD-10-CM

## 2021-01-28 DIAGNOSIS — B95.2 UTI (URINARY TRACT INFECTION) DUE TO ENTEROCOCCUS: Primary | ICD-10-CM

## 2021-01-28 LAB
CULTURE: ABNORMAL
Lab: ABNORMAL
SPECIMEN DESCRIPTION: ABNORMAL

## 2021-01-28 RX ORDER — NITROFURANTOIN 25; 75 MG/1; MG/1
100 CAPSULE ORAL 2 TIMES DAILY
Qty: 14 CAPSULE | Refills: 0 | Status: SHIPPED | OUTPATIENT
Start: 2021-01-28 | End: 2021-02-04

## 2021-01-28 NOTE — RESULT ENCOUNTER NOTE
Please notify patient lab results UTI from e coli. Prescription sent into pharmacy of record.   DA/RYANNM

## 2021-02-05 LAB — CYTOLOGY REPORT: NORMAL

## 2021-02-08 ENCOUNTER — TELEPHONE (OUTPATIENT)
Dept: FAMILY MEDICINE CLINIC | Age: 61
End: 2021-02-08

## 2021-02-08 NOTE — LETTER
Teri Mohan A department of [place] [address]  Phone: [phone]  Fax: [address] [place], [place]        February 8, 2021    [address] [facility]      Dear Olga Schilling: This is to remind you that you have bllod work that needs completed and you are due for follo up appointment with Edgar Bolaños. Juanita Quick    If you have any questions or concerns, please don't hesitate to call.     Sincerely,        RENATO Mascorro/[place]

## 2021-02-17 ENCOUNTER — OFFICE VISIT (OUTPATIENT)
Dept: PRIMARY CARE CLINIC | Age: 61
End: 2021-02-17
Payer: MEDICAID

## 2021-02-17 ENCOUNTER — HOSPITAL ENCOUNTER (OUTPATIENT)
Dept: INTERVENTIONAL RADIOLOGY/VASCULAR | Age: 61
Discharge: HOME | End: 2021-02-19
Payer: MEDICAID

## 2021-02-17 VITALS
BODY MASS INDEX: 22.34 KG/M2 | HEART RATE: 73 BPM | SYSTOLIC BLOOD PRESSURE: 110 MMHG | WEIGHT: 139 LBS | HEIGHT: 66 IN | TEMPERATURE: 97.3 F | DIASTOLIC BLOOD PRESSURE: 80 MMHG | OXYGEN SATURATION: 99 % | RESPIRATION RATE: 18 BRPM

## 2021-02-17 DIAGNOSIS — M54.50 ACUTE BILATERAL LOW BACK PAIN WITHOUT SCIATICA: ICD-10-CM

## 2021-02-17 DIAGNOSIS — M79.662 PAIN AND SWELLING OF LEFT LOWER LEG: Primary | ICD-10-CM

## 2021-02-17 DIAGNOSIS — M79.89 PAIN AND SWELLING OF LEFT LOWER LEG: Primary | ICD-10-CM

## 2021-02-17 DIAGNOSIS — M79.662 PAIN AND SWELLING OF LEFT LOWER LEG: ICD-10-CM

## 2021-02-17 DIAGNOSIS — M79.89 PAIN AND SWELLING OF LEFT LOWER LEG: ICD-10-CM

## 2021-02-17 PROCEDURE — G8484 FLU IMMUNIZE NO ADMIN: HCPCS | Performed by: NURSE PRACTITIONER

## 2021-02-17 PROCEDURE — 3017F COLORECTAL CA SCREEN DOC REV: CPT | Performed by: NURSE PRACTITIONER

## 2021-02-17 PROCEDURE — 4004F PT TOBACCO SCREEN RCVD TLK: CPT | Performed by: NURSE PRACTITIONER

## 2021-02-17 PROCEDURE — 93971 EXTREMITY STUDY: CPT

## 2021-02-17 PROCEDURE — G8427 DOCREV CUR MEDS BY ELIG CLIN: HCPCS | Performed by: NURSE PRACTITIONER

## 2021-02-17 PROCEDURE — 99214 OFFICE O/P EST MOD 30 MIN: CPT | Performed by: NURSE PRACTITIONER

## 2021-02-17 PROCEDURE — 99214 OFFICE O/P EST MOD 30 MIN: CPT

## 2021-02-17 PROCEDURE — G8420 CALC BMI NORM PARAMETERS: HCPCS | Performed by: NURSE PRACTITIONER

## 2021-02-17 RX ORDER — IBUPROFEN 600 MG/1
600 TABLET ORAL 3 TIMES DAILY PRN
Qty: 90 TABLET | Refills: 0 | Status: SHIPPED | OUTPATIENT
Start: 2021-02-17 | End: 2021-03-22

## 2021-02-17 ASSESSMENT — ENCOUNTER SYMPTOMS
BOWEL INCONTINENCE: 0
ABDOMINAL PAIN: 0
RESPIRATORY NEGATIVE: 1
BACK PAIN: 1

## 2021-02-17 NOTE — PATIENT INSTRUCTIONS
Patient Education   Stop taking meloxicam while taking ibuprofen. Ice 3 times daily for 20 minutes. Wear ace wrap while awake. Learning About Low Back Pain  What is low back pain? Low back pain is pain that can occur anywhere below the ribs and above the legs. It is very common. Almost everyone has it at one time or another. Low back pain can be:  · Acute. This is new pain that can last a few days to a few weeks--at the most a few months. · Chronic. This pain can last for more than a few months. Sometimes it can last for years. What are some myths about low back pain? Here are some common myths about low back pain--and the facts:  Myth: \"I need to rest my back when I have back pain. \"   Fact: Staying active won't hurt you. It may help you get better faster. Myth: \"I need prescription pain medicine. \"   Fact: It's best to try to let time and being active heal your back. Opioid pain medicines--such as hydrocodone or oxycodone--usually don't work any better than over-the-counter medicines like ibuprofen or naproxen. And opioids can cause serious problems like opioid use disorder or overdose. Moderate to severe opioid use disorder is sometimes called addiction. Myth: \"I need a test like an X-ray or an MRI to diagnose my low back pain. \"   Fact: Getting a test right away won't help you get better faster. And it could lead you down a treatment path you may not need, since most people get better on their own. What causes low back pain? In most cases, there isn't a clear cause. This can be frustrating, because your back hurts and there's no obvious reason. Your back pain can be caused by:  Overuse or muscle strain. This can happen from playing sports, lifting heavy things, or not being physically fit. A herniated disc. This is a problem with the cushion between the bones in your back. Arthritis. With age, you may have changes in your bones that can narrow the space around your nerves. Other causes.    In rare cases, the cause is a serious illness like an infection or cancer. But there are usually other symptoms too. What are the symptoms? Back pain can come on quickly or over time. You may feel:  · Pain in your hips or buttock. · Leg pain, numbness, tingling, or weakness. When a nerve gets squeezed--such as from a disc problem or arthritis--you may have symptoms in your leg or foot. You can even have leg symptoms from a back problem without having any pain in your back. · Pain that's sharp or dull, sometimes with stiffness or muscle spasms. It may be in one small area or over a broad area. But even bad pain doesn't mean that it's caused by something serious. How is low back pain diagnosed? A physical exam is the main way to diagnose low back pain. Your doctor may examine your back, check your nerves by testing your reflexes, and make sure that your muscles are strong. Your doctor also will ask questions about your back and overall health. Most people don't need any tests right away. Tests often don't show the reason for your pain. If your pain lasts more than 6 weeks or you have symptoms that your doctor is more concerned about, then your doctor may order tests. These may include an X-ray, a CT scan, or an MRI. Sometimes other tests such as a bone scan or nerve conduction test may be done. How is low back pain treated? Most acute low back pain gets better on its own within a few weeks, no matter what the cause. Time and doing usual activities are all that most people need to feel better. Using heat or ice and taking over-the-counter pain medicine also can help while your body heals. If you aren't getting better on your own or your pain is very bad, your doctor may recommend:  · Physical therapy. · Spinal manipulation, such as by a chiropractor. · Acupuncture. · Massage. · Injections of steroid medicine in your back (especially for pain that involves your legs).   If you have chronic low back pain, treatment will help you understand and manage your pain. Treatment may include:  · Staying active. This may include walking or doing back exercises. · Physical therapy. · Medicines. Some of these medicines are also used for other problems, like depression. · Pain management. Your doctor may have you see a pain specialist.  · Counseling. Having chronic pain can be hard. It may help to talk to someone who can help you cope with your pain. Surgery isn't needed for most people. But it may help some types of low back pain. Follow-up care is a key part of your treatment and safety. Be sure to make and go to all appointments, and call your doctor if you are having problems. It's also a good idea to know your test results and keep a list of the medicines you take. When should you call for help? Call 911 anytime you think you may need emergency care. For example, call if:  · You can't move a leg at all. Call your doctor now or seek immediate medical care if:  · You have new or worse symptoms in your legs, belly, or buttocks. Symptoms may include:  ? Numbness or tingling. ? Weakness. ? Pain. · You lose bladder or bowel control. Watch closely for changes in your health, and be sure to contact your doctor if:  · Along with the back pain, you have a fever, lose weight, or don't feel well. · You do not get better as expected. Where can you learn more? Go to https://TeblacarineIngeny.Attune Foods. org and sign in to your [facility] account. Enter A007 in the [facility] box to learn more about \"Learning About Low Back Pain. \"     If you do not have an account, please click on the \"Sign Up Now\" link. Current as of: March 2, 2020               Content Version: 12.6  © 3820-1681 "[employer] Incorporated. Care instructions adapted under license by [facility] ([facility]).  If you have questions about a medical condition or this instruction, always ask your healthcare professional. Lore Kidd any the other leg. Repeat 2 to 4 times with each leg. 6. To get more stretch, put your other leg flat on the floor while pulling your knee to your chest.    Curl-ups   1. Lie on the floor on your back with your knees bent at a 90-degree angle. Your feet should be flat on the floor, about 12 inches from your buttocks. 2. Cross your arms over your chest. If this bothers your neck, try putting your hands behind your neck (not your head), with your elbows spread apart. 3. Slowly tighten your belly muscles and raise your shoulder blades off the floor. 4. Keep your head in line with your body, and do not press your chin to your chest.  5. Hold this position for 1 or 2 seconds, then slowly lower yourself back down to the floor. 6. Repeat 8 to 12 times. Pelvic tilt exercise   1. Lie on your back with your knees bent. 2. \"Brace\" your stomach. This means to tighten your muscles by pulling in and imagining your belly button moving toward your spine. You should feel like your back is pressing to the floor and your hips and pelvis are rocking back. 3. Hold for about 6 seconds while you breathe smoothly. 4. Repeat 8 to 12 times. Heel dig bridging   1. Lie on your back with both knees bent and your ankles bent so that only your heels are digging into the floor. Your knees should be bent about 90 degrees. 2. Then push your heels into the floor, squeeze your buttocks, and lift your hips off the floor until your shoulders, hips, and knees are all in a straight line. 3. Hold for about 6 seconds as you continue to breathe normally, and then slowly lower your hips back down to the floor and rest for up to 10 seconds. 4. Do 8 to 12 repetitions. Hamstring stretch in doorway   1. Lie on your back in a doorway, with one leg through the open door. 2. Slide your leg up the wall to straighten your knee. You should feel a gentle stretch down the back of your leg. 3. Hold the stretch for at least 15 to 30 seconds.  Do not arch your back, point your toes, or bend either knee. Keep one heel touching the floor and the other heel touching the wall. 4. Repeat with your other leg. 5. Do 2 to 4 times for each leg. Hip flexor stretch   1. Kneel on the floor with one knee bent and one leg behind you. Place your forward knee over your foot. Keep your other knee touching the floor. 2. Slowly push your hips forward until you feel a stretch in the upper thigh of your rear leg. 3. Hold the stretch for at least 15 to 30 seconds. Repeat with your other leg. 4. Do 2 to 4 times on each side. Wall sit   1. Stand with your back 10 to 12 inches away from a wall. 2. Lean into the wall until your back is flat against it. 3. Slowly slide down until your knees are slightly bent, pressing your lower back into the wall. 4. Hold for about 6 seconds, then slide back up the wall. 5. Repeat 8 to 12 times. Follow-up care is a key part of your treatment and safety. Be sure to make and go to all appointments, and call your doctor if you are having problems. It's also a good idea to know your test results and keep a list of the medicines you take. Where can you learn more? Go to https://Dualog.alooma. org and sign in to your [employer] account. Enter N037 in the [facility] box to learn more about \"Low Back Pain: Exercises. \"     If you do not have an account, please click on the \"Sign Up Now\" link. Current as of: March 2, 2020               Content Version: 12.6  © 2006-2020 [employer]. Care instructions adapted under license by [facility] ([facility]). If you have questions about a medical condition or this instruction, always ask your healthcare professional. [place] [address] any warranty or liability for your use of this information. Patient Education        Leg Pain: Care Instructions  Your Care Instructions  Many things can cause leg pain.  Too much exercise or overuse can cause a muscle cramp (or charley horse). You can get leg cramps from not eating a balanced diet that has enough potassium, calcium, and other minerals. If you do not drink enough fluids or are taking certain medicines, you may develop leg cramps. Other causes of leg pain include injuries, blood flow problems, nerve damage, and twisted and enlarged veins (varicose veins). You can usually ease pain with self-care. Your doctor may recommend that you rest your leg and keep it elevated. Follow-up care is a key part of your treatment and safety. Be sure to make and go to all appointments, and call your doctor if you are having problems. It's also a good idea to know your test results and keep a list of the medicines you take. How can you care for yourself at home? · Take pain medicines exactly as directed. ? If the doctor gave you a prescription medicine for pain, take it as prescribed. ? If you are not taking a prescription pain medicine, ask your doctor if you can take an over-the-counter medicine. · Take any other medicines exactly as prescribed. Call your doctor if you think you are having a problem with your medicine. · Rest your leg while you have pain, and avoid standing for long periods of time. · Prop up your leg at or above the level of your heart when possible. · Make sure you are eating a balanced diet that is rich in calcium, potassium, and magnesium, especially if you are pregnant. · If directed by your doctor, put ice or a cold pack on the area for 10 to 20 minutes at a time. Put a thin cloth between the ice and your skin. · Your leg may be in a splint, a brace, or an elastic bandage, and you may have crutches to help you walk. Follow your doctor's directions about how long to wear supports and how to use the crutches. When should you call for help? Call 911 anytime you think you may need emergency care.  For example, call if:    · You have sudden chest pain and shortness of breath, or you cough up blood.     · Your leg is cool or pale or changes color. Call your doctor now or seek immediate medical care if:    · You have increasing or severe pain.     · Your leg suddenly feels weak and you cannot move it.     · You have signs of a blood clot, such as:  ? Pain in your calf, back of the knee, thigh, or groin. ? Redness and swelling in your leg or groin.     · You have signs of infection, such as:  ? Increased pain, swelling, warmth, or redness. ? Red streaks leading from the sore area. ? Pus draining from a place on your leg. ? A fever.     · You cannot bear weight on your leg. Watch closely for changes in your health, and be sure to contact your doctor if:    · You do not get better as expected. Where can you learn more? Go to https://[email or web address].[email or web address] org and sign in to your [employer] account. Enter J039 in the [facility] box to learn more about \"Leg Pain: Care Instructions. \"     If you do not have an account, please click on the \"Sign Up Now\" link. Current as of: June 26, 2019               Content Version: 12.6  © 1009-4733 [employer]. Care instructions adapted under license by [facility] ([facility]). If you have questions about a medical condition or this instruction, always ask your healthcare professional. [address] any warranty or liability for your use of this information.

## 2021-02-17 NOTE — PROGRESS NOTES
numbness). Pertinent negatives include no tingling. Associated symptoms comments: Today the pain in left posterior calf is 8/10 and there is swelling. . The symptoms are aggravated by palpation, weight bearing and movement. She has tried acetaminophen, NSAIDs and rest for the symptoms. The treatment provided no relief. She has the following problem list:  Patient Active Problem List   Diagnosis    CAD (coronary artery disease)    Tobacco abuse    Hypertension    Hypercholesterolemia    History of thyroiditis    Insomnia    Hydronephrosis with obstructing calculus    Kidney stone    Vitamin D deficiency        Current medications are:  Current Outpatient Medications   Medication Sig Dispense Refill    cyclobenzaprine (FLEXERIL) 10 MG tablet take 1 tablet by mouth three times a day if needed for muscle spasm 45 tablet 0    atorvastatin (LIPITOR) 20 MG tablet take 1 tablet by mouth once daily 30 tablet 2    ibuprofen (ADVIL;MOTRIN) 600 MG tablet Take 1 tablet by mouth 3 times daily as needed for Pain 30 tablet 0    lisinopril (PRINIVIL;ZESTRIL) 10 MG tablet take 1 tablet by mouth once daily 30 tablet 6    meloxicam (MOBIC) 15 MG tablet take 1 tablet by mouth once daily if needed for pain 30 tablet 6    albuterol sulfate  (90 Base) MCG/ACT inhaler Inhale 2 puffs into the lungs every 6 hours as needed for Wheezing 1 Inhaler 1    vitamin D (ERGOCALCIFEROL) 1.25 MG (15928 UT) CAPS capsule Take 1 capsule by mouth once a week 16 capsule 3    Glucosamine-Chondroit-Vit C-Mn (GLUCOSAMINE 1500 COMPLEX PO) Take by mouth daily      CRANBERRY PO Take by mouth daily      Multiple Vitamins-Minerals (HAIR/SKIN/NAILS PO) Take by mouth daily      ascorbic acid (VITAMIN C) 500 MG tablet Take 500 mg by mouth daily. No current facility-administered medications for this visit. She has No Known Allergies. .    She  reports that she has been smoking. She started smoking about 40 years ago.  She has a 15.00 pack-year smoking history. She has never used smokeless tobacco.      Objective:    Vitals:    02/17/21 1525   BP: 110/80   Pulse: 73   Resp: 18   Temp: 97.3 °F (36.3 °C)   TempSrc: Temporal   SpO2: 99%   Weight: 139 lb (63 kg)   Height: 5' 6\" (1.676 m)     Body mass index is 22.44 kg/m². Review of Systems   Constitutional: Negative. Negative for fever. Respiratory: Negative. Cardiovascular: Negative. Gastrointestinal: Negative for abdominal pain and bowel incontinence. Genitourinary: Negative for bladder incontinence, dysuria and pelvic pain. Musculoskeletal: Positive for back pain. Left posterior calf pain   Neurological: Positive for numbness (when standing or walking toes of left foot intermittent numbness). Negative for tingling, weakness and headaches. Physical Exam  Vitals signs and nursing note reviewed. Constitutional:       Appearance: She is well-developed. HENT:      Head: Normocephalic. Eyes:      Pupils: Pupils are equal, round, and reactive to light. Neck:      Musculoskeletal: Normal range of motion and neck supple. Cardiovascular:      Rate and Rhythm: Normal rate and regular rhythm. Pulses: Normal pulses. Heart sounds: Normal heart sounds. Pulmonary:      Effort: Pulmonary effort is normal.      Breath sounds: Normal breath sounds and air entry. Musculoskeletal:      Lumbar back: She exhibits tenderness (with palpation). She exhibits normal range of motion, no bony tenderness, no swelling, no spasm and normal pulse. Back:       Left lower leg: She exhibits tenderness and swelling. She exhibits no bony tenderness. Legs:    Skin:     General: Skin is warm and dry. Neurological:      Mental Status: She is alert and oriented to person, place, and time. Psychiatric:         Behavior: Behavior normal.         Thought Content:  Thought content normal.       Assessment and Plan:    Vl Dup Lower Extremity Venous Left    Result Date: 2/17/2021  EXAMINATION: DUPLEX VENOUS ULTRASOUND OF THE LEFT LOWER EXTREMITY 2/17/2021 4:33 pm TECHNIQUE: Duplex ultrasound and Doppler images were obtained of the left lower extremity. COMPARISON: None. HISTORY: ORDERING SYSTEM PROVIDED HISTORY: Pain and swelling of left lower leg TECHNOLOGIST PROVIDED HISTORY: pain and swelling of left lower leg FINDINGS: The visualized veins of the left lower extremity are patent and free of echogenic thrombus. The veins are normally compressible and have normal phasic flow. No evidence of DVT in the left lower extremity. Diagnosis Orders   1. Pain and swelling of left lower leg  VL DUP LOWER EXTREMITY VENOUS LEFT   2. Acute bilateral low back pain without sciatica         No improvement, see HPI. Take Tylenol or ibuprofen as needed for pain. Stop taking Mobic while taking ibuprofen. Apply a compressive ACE bandage. Rest and elevate the affected painful area. Apply cold compresses intermittently as needed. As pain recedes, begin normal activities slowly as tolerated. Follow up with PCP if symptoms do not improve or worsen. The use, risks, benefits, and side effects of prescribed or recommended medications were discussed. All questions were answered and the patient/caregiver voiced understanding. No orders of the defined types were placed in this encounter.         Electronically signed by AARTI Olmos CNP on 2/17/21 at 3:33 PM EST

## 2021-02-22 DIAGNOSIS — E55.9 VITAMIN D DEFICIENCY: Primary | ICD-10-CM

## 2021-03-02 ENCOUNTER — TELEPHONE (OUTPATIENT)
Dept: OBGYN | Age: 61
End: 2021-03-02

## 2021-03-02 NOTE — TELEPHONE ENCOUNTER
Attempted to reach by phone regarding vit d level, not home. Office number left with male who answered the phone to have patient call regarding over due lab order.  Order extended x 1 month

## 2021-03-08 ENCOUNTER — HOSPITAL ENCOUNTER (OUTPATIENT)
Dept: LAB | Age: 61
Discharge: HOME | End: 2021-03-08
Payer: MEDICAID

## 2021-03-08 DIAGNOSIS — E78.00 HYPERCHOLESTEROLEMIA: ICD-10-CM

## 2021-03-08 DIAGNOSIS — E55.9 VITAMIN D DEFICIENCY: ICD-10-CM

## 2021-03-08 DIAGNOSIS — I10 ESSENTIAL HYPERTENSION: ICD-10-CM

## 2021-03-08 LAB
ALBUMIN SERPL-MCNC: 4.4 G/DL (ref 3.5–5.2)
ALBUMIN/GLOBULIN RATIO: 1.7 (ref 1–2.5)
ALP BLD-CCNC: 77 U/L (ref 35–104)
ALT SERPL-CCNC: 18 U/L (ref 5–33)
ANION GAP SERPL CALCULATED.3IONS-SCNC: 8 MMOL/L (ref 9–17)
AST SERPL-CCNC: 18 U/L
BILIRUB SERPL-MCNC: 0.44 MG/DL (ref 0.3–1.2)
BUN BLDV-MCNC: 19 MG/DL (ref 6–20)
BUN/CREAT BLD: 17 (ref 9–20)
CALCIUM SERPL-MCNC: 9.8 MG/DL (ref 8.6–10.4)
CHLORIDE BLD-SCNC: 104 MMOL/L (ref 98–107)
CHOLESTEROL/HDL RATIO: 4.3
CHOLESTEROL: 254 MG/DL
CO2: 30 MMOL/L (ref 20–31)
CREAT SERPL-MCNC: 1.12 MG/DL (ref 0.5–0.9)
GFR AFRICAN AMERICAN: >60 ML/MIN
GFR NON-AFRICAN AMERICAN: 50 ML/MIN
GFR SERPL CREATININE-BSD FRML MDRD: ABNORMAL ML/MIN/{1.73_M2}
GFR SERPL CREATININE-BSD FRML MDRD: ABNORMAL ML/MIN/{1.73_M2}
GLUCOSE BLD-MCNC: 98 MG/DL (ref 70–99)
HDLC SERPL-MCNC: 59 MG/DL
LDL CHOLESTEROL: 172 MG/DL (ref 0–130)
POTASSIUM SERPL-SCNC: 4.1 MMOL/L (ref 3.7–5.3)
SODIUM BLD-SCNC: 142 MMOL/L (ref 135–144)
TOTAL PROTEIN: 7 G/DL (ref 6.4–8.3)
TRIGL SERPL-MCNC: 116 MG/DL
VITAMIN D 25-HYDROXY: 23.7 NG/ML (ref 30–100)
VITAMIN D 25-HYDROXY: 23.7 NG/ML (ref 30–100)
VLDLC SERPL CALC-MCNC: ABNORMAL MG/DL (ref 1–30)

## 2021-03-08 PROCEDURE — 82306 VITAMIN D 25 HYDROXY: CPT

## 2021-03-08 PROCEDURE — 80061 LIPID PANEL: CPT

## 2021-03-08 PROCEDURE — 80053 COMPREHEN METABOLIC PANEL: CPT

## 2021-03-08 PROCEDURE — 36415 COLL VENOUS BLD VENIPUNCTURE: CPT

## 2021-03-08 RX ORDER — ERGOCALCIFEROL 1.25 MG/1
50000 CAPSULE ORAL WEEKLY
Qty: 16 CAPSULE | Refills: 3 | Status: SHIPPED | OUTPATIENT
Start: 2021-03-08 | End: 2022-02-11 | Stop reason: SDUPTHER

## 2021-03-09 ENCOUNTER — OFFICE VISIT (OUTPATIENT)
Dept: FAMILY MEDICINE CLINIC | Age: 61
End: 2021-03-09
Payer: MEDICAID

## 2021-03-09 VITALS
BODY MASS INDEX: 22.18 KG/M2 | HEART RATE: 96 BPM | SYSTOLIC BLOOD PRESSURE: 162 MMHG | DIASTOLIC BLOOD PRESSURE: 84 MMHG | WEIGHT: 137.4 LBS

## 2021-03-09 DIAGNOSIS — M54.16 LUMBAR RADICULOPATHY: ICD-10-CM

## 2021-03-09 DIAGNOSIS — E55.9 VITAMIN D DEFICIENCY: ICD-10-CM

## 2021-03-09 DIAGNOSIS — I10 ESSENTIAL HYPERTENSION: Primary | ICD-10-CM

## 2021-03-09 DIAGNOSIS — E78.00 HYPERCHOLESTEROLEMIA: ICD-10-CM

## 2021-03-09 PROCEDURE — 99214 OFFICE O/P EST MOD 30 MIN: CPT | Performed by: PHYSICIAN ASSISTANT

## 2021-03-09 PROCEDURE — 3017F COLORECTAL CA SCREEN DOC REV: CPT | Performed by: PHYSICIAN ASSISTANT

## 2021-03-09 PROCEDURE — G8420 CALC BMI NORM PARAMETERS: HCPCS | Performed by: PHYSICIAN ASSISTANT

## 2021-03-09 PROCEDURE — G8484 FLU IMMUNIZE NO ADMIN: HCPCS | Performed by: PHYSICIAN ASSISTANT

## 2021-03-09 PROCEDURE — 4004F PT TOBACCO SCREEN RCVD TLK: CPT | Performed by: PHYSICIAN ASSISTANT

## 2021-03-09 PROCEDURE — 99212 OFFICE O/P EST SF 10 MIN: CPT

## 2021-03-09 PROCEDURE — G8427 DOCREV CUR MEDS BY ELIG CLIN: HCPCS | Performed by: PHYSICIAN ASSISTANT

## 2021-03-09 RX ORDER — ATORVASTATIN CALCIUM 40 MG/1
40 TABLET, FILM COATED ORAL DAILY
Qty: 30 TABLET | Refills: 5 | Status: SHIPPED | OUTPATIENT
Start: 2021-03-09 | End: 2021-09-09 | Stop reason: SDUPTHER

## 2021-03-09 RX ORDER — LISINOPRIL 10 MG/1
TABLET ORAL
Qty: 30 TABLET | Refills: 5 | Status: SHIPPED | OUTPATIENT
Start: 2021-03-09 | End: 2021-09-09 | Stop reason: SDUPTHER

## 2021-03-09 RX ORDER — ATORVASTATIN CALCIUM 20 MG/1
TABLET, FILM COATED ORAL
Qty: 30 TABLET | Refills: 5 | Status: CANCELLED | OUTPATIENT
Start: 2021-03-09

## 2021-03-09 ASSESSMENT — ENCOUNTER SYMPTOMS
BLURRED VISION: 0
BACK PAIN: 1
RESPIRATORY NEGATIVE: 1

## 2021-03-09 NOTE — RESULT ENCOUNTER NOTE
Please notify patient lab results need to continue with supplementation. Prescription refilled  DA/CNM.

## 2021-03-09 NOTE — PROGRESS NOTES
Subjective:      Patient ID: Virginia Raymond is a 62 y.o. female. Hypertension  This is a chronic problem. The current episode started more than 1 year ago. The problem is uncontrolled. Pertinent negatives include no anxiety or blurred vision. Past treatments include ACE inhibitors. The current treatment provides significant improvement. Compliance problems include psychosocial issues. Hyperlipidemia  This is a chronic problem. The current episode started more than 1 year ago. The problem is uncontrolled. Associated symptoms include leg pain. Current antihyperlipidemic treatment includes statins. The current treatment provides significant improvement of lipids. There are no compliance problems. Leg Pain   The incident occurred more than 1 week ago. The incident occurred at home. Injury mechanism: pushing Jeep out of snow. The pain is present in the left leg. Associated symptoms include numbness and tingling. She has tried NSAIDs for the symptoms. Other  This is a chronic (Vitamin D deficiency) problem. The current episode started more than 1 month ago. Associated symptoms include numbness. Treatments tried: Vitamin D supplementation. Review of Systems   Constitutional: Negative. HENT: Negative. Eyes: Negative for blurred vision. Respiratory: Negative. Cardiovascular: Negative. Musculoskeletal: Positive for back pain. Neurological: Positive for tingling and numbness. Past Medical History:   Diagnosis Date    Abnormal Pap smear of cervix 1970    before her hysterectomy in 1972    CAD (coronary artery disease)     Chronic kidney disease     COPD (chronic obstructive pulmonary disease) ([address]     Hemorrhoids     History of herpes zoster 2002    (right lower rib area laterally and anteriorly).  History of thyroiditis 1992    History of ulcerative colitis     (of 20 years disease - though quiescent).     Hypercholesterolemia     Hypertension     Neuritis     Shortened NH interval     (on EKG from 2004).  Tobacco abuse     Venous insufficiency, peripheral     (bilateral). Past Surgical History:   Procedure Laterality Date    APPENDECTOMY  1982    (Dr. Asia Whitaker) - Acute appendicitis.  CARDIAC CATHETERIZATION  2000    Preserved left ventricular systolic function. Ejection fraction 50%. Noncritical coronary disease. (Dr. Jenny Allen).  CHOLECYSTECTOMY  2001    COLONOSCOPY  1988    (Dr. Mac Ramos) - Normal colon.  COLONOSCOPY  5/12/16    Polyp distal sigmoid, rectum - Dr Mathieu Colindres Left 8/5/2019    CYSTO Left Stent Insertion performed by Roberto Coyle MD at [facility]  2010    Incision and drainage of thrombosed hemorrhoid.  HYSTERECTOMY  2001    (no BSO).  LITHOTRIPSY Left 8/19/2019    CYSTO W/ STENT REMOVAL ET Left LITHOTRIPSY performed by Roberto Coyle MD at [address] [place] [address] surgery x8.    OTHER SURGICAL HISTORY  1987    Pelvic exam under anesthesia. (Dr. Gauri Davenport) - Chronic pelvic pain, chronic inflammatory process changes.  TUBAL LIGATION  1982     Social History     Tobacco Use    Smoking status: Current Every Day Smoker     Packs/day: 1.00     Years: 15.00     Pack years: 15.00     Start date: 1/1/1981    Smokeless tobacco: Never Used    Tobacco comment: Fatou Rod[facility]guez [facility] 8/4/19   Substance Use Topics    Alcohol use: Yes     Alcohol/week: 0.0 standard drinks     Frequency: Monthly or less     Drinks per session: 1 or 2     Binge frequency: Never     Comment: occasional    Drug use: No       Objective:   Physical Exam  HENT:      Head: Normocephalic. Right Ear: Tympanic membrane normal.      Left Ear: Tympanic membrane normal.      Mouth/Throat:      Mouth: Mucous membranes are moist.   Eyes:      Pupils: Pupils are equal, round, and reactive to light. Neck:      Musculoskeletal: Neck supple. Cardiovascular:      Rate and Rhythm: Normal rate.    Pulmonary:      Effort: Pulmonary effort is normal. Breath sounds: Normal breath sounds. Musculoskeletal:      Lumbar back: She exhibits tenderness. Back:       Comments: + straight leg raise on left   Neurological:      General: No focal deficit present. Mental Status: She is alert and oriented to person, place, and time. Psychiatric:         Mood and Affect: Mood normal.       Hospital Outpatient Visit on 03/08/2021   Component Date Value Ref Range Status    Vit D, 25-Hydroxy 03/08/2021 23.7* 30.0 - 100.0 ng/mL Final    Comment:    Reference Range:  Vitamin D status         Range   Deficiency              <20 ng/mL   Mild Deficiency       20-30 ng/mL   Sufficiency           ng/mL   Toxicity               >100 ng/mL     Hospital Outpatient Visit on 03/08/2021   Component Date Value Ref Range Status    Vit D, 25-Hydroxy 03/08/2021 23.7* 30.0 - 100.0 ng/mL Final    Comment:    Reference Range:  Vitamin D status         Range   Deficiency              <20 ng/mL   Mild Deficiency       20-30 ng/mL   Sufficiency           ng/mL   Toxicity               >100 ng/mL      Cholesterol 03/08/2021 254* <200 mg/dL Final    Comment:    Cholesterol Guidelines:      <200  Desirable   200-240  Borderline      >240  Undesirable         HDL 03/08/2021 59  >40 mg/dL Final    Comment:    HDL Guidelines:    <40     Undesirable   40-59    Borderline    >59     Desirable         LDL Cholesterol 03/08/2021 172* 0 - 130 mg/dL Final    Comment:    LDL Guidelines:     <100    Desirable   100-129   Near to/above Desirable   130-159   Borderline      >159   Undesirable     Direct (measured) LDL and calculated LDL are not interchangeable tests.  Chol/HDL Ratio 03/08/2021 4.3  <5 Final            Triglycerides 03/08/2021 116  <150 mg/dL Final    Comment:    Triglyceride Guidelines:     <150   Desirable   150-199  Borderline   200-499  High     >499   Very high   Based on AHA Guidelines for fasting triglyceride, October 2012.          VLDL 03/08/2021 NOT

## 2021-03-11 ENCOUNTER — OFFICE VISIT (OUTPATIENT)
Dept: OTOLARYNGOLOGY | Age: 61
End: 2021-03-11
Payer: MEDICAID

## 2021-03-11 VITALS
BODY MASS INDEX: 22.18 KG/M2 | HEIGHT: 66 IN | HEART RATE: 71 BPM | WEIGHT: 138 LBS | SYSTOLIC BLOOD PRESSURE: 130 MMHG | DIASTOLIC BLOOD PRESSURE: 82 MMHG | OXYGEN SATURATION: 99 %

## 2021-03-11 DIAGNOSIS — H90.A32 MIXED CONDUCTIVE AND SENSORINEURAL HEARING LOSS OF LEFT EAR WITH RESTRICTED HEARING OF RIGHT EAR: ICD-10-CM

## 2021-03-11 DIAGNOSIS — H61.23 BILATERAL IMPACTED CERUMEN: Primary | ICD-10-CM

## 2021-03-11 DIAGNOSIS — H90.A21 SENSORINEURAL HEARING LOSS (SNHL) OF RIGHT EAR WITH RESTRICTED HEARING OF LEFT EAR: ICD-10-CM

## 2021-03-11 DIAGNOSIS — Z90.89 H/O MASTOIDECTOMY: ICD-10-CM

## 2021-03-11 PROCEDURE — G8484 FLU IMMUNIZE NO ADMIN: HCPCS | Performed by: OTOLARYNGOLOGY

## 2021-03-11 PROCEDURE — G8420 CALC BMI NORM PARAMETERS: HCPCS | Performed by: OTOLARYNGOLOGY

## 2021-03-11 PROCEDURE — 99214 OFFICE O/P EST MOD 30 MIN: CPT

## 2021-03-11 PROCEDURE — 3017F COLORECTAL CA SCREEN DOC REV: CPT | Performed by: OTOLARYNGOLOGY

## 2021-03-11 PROCEDURE — G8427 DOCREV CUR MEDS BY ELIG CLIN: HCPCS | Performed by: OTOLARYNGOLOGY

## 2021-03-11 PROCEDURE — 69220 CLEAN OUT MASTOID CAVITY: CPT | Performed by: OTOLARYNGOLOGY

## 2021-03-11 PROCEDURE — 99213 OFFICE O/P EST LOW 20 MIN: CPT | Performed by: OTOLARYNGOLOGY

## 2021-03-11 PROCEDURE — 4004F PT TOBACCO SCREEN RCVD TLK: CPT | Performed by: OTOLARYNGOLOGY

## 2021-03-11 NOTE — PROGRESS NOTES
orbits demonstrate no acute abnormality.         SINUSES: There is polyp versus mucous retention cyst in the right maxillary    sinus.              Impression    Postsurgical change to the left temporal bone region without definite    residual soft tissue abnormality.         Mild fluid in the left mastoid air cells.         Polyp versus mucous retention cyst in the right maxillary sinus. Audio 8/13/2020:  AD moderate to severe SNHL  AS severe to profound mixed HL, type B tymp    March 11, 2021     Outpatient Medications Prior to Visit   Medication Sig Dispense Refill    lisinopril (PRINIVIL;ZESTRIL) 10 MG tablet take 1 tablet by mouth once daily 30 tablet 5    atorvastatin (LIPITOR) 40 MG tablet Take 1 tablet by mouth daily 30 tablet 5    vitamin D (ERGOCALCIFEROL) 1.25 MG (17301 UT) CAPS capsule Take 1 capsule by mouth once a week 16 capsule 3    ibuprofen (ADVIL;MOTRIN) 600 MG tablet Take 1 tablet by mouth 3 times daily as needed for Pain 90 tablet 0    cyclobenzaprine (FLEXERIL) 10 MG tablet take 1 tablet by mouth three times a day if needed for muscle spasm 45 tablet 0    CRANBERRY PO Take by mouth daily      Multiple Vitamins-Minerals (HAIR/SKIN/NAILS PO) Take by mouth daily      ascorbic acid (VITAMIN C) 500 MG tablet Take 500 mg by mouth daily.  albuterol sulfate  (90 Base) MCG/ACT inhaler Inhale 2 puffs into the lungs every 6 hours as needed for Wheezing (Patient not taking: Reported on 3/9/2021) 1 Inhaler 1    Glucosamine-Chondroit-Vit C-Mn (GLUCOSAMINE 1500 COMPLEX PO) Take by mouth daily       No facility-administered medications prior to visit. Drug Sensitivities: Patient has no known allergies. Blood pressure 130/82, pulse 71, height 5' 6\" (1.676 m), weight 138 lb (62.6 kg), SpO2 99 %, not currently breastfeeding.     General: The patient is found to be alert and normally responsive female with grossly normal hearing wearing AD hearing aid, clear voice and normal articulation. Communication is without difficulty. Skin: The skin has normal colour and turgor. Face: The facial contour is symmetric at rest and with movement. Ears: The pinnae have normal contours. AS: EAC clear  AS crusting from tm and mastoid removed w alligator (lifted off with curette) on reconstructed TM, mostly superior on the TM in a crescent, keratin/debris plug at 1-2 o'clock, keratinized, no mucosa, small amount of crusting superior anterior tm, mastoid bowl with crusting, removed with alligator   AD: EAC with cerumen, severe retraction of pars flacida and tensa onto promontory, no effusion or cholesteatoma, looks like TM was reconstructed with cartilage graft in posterior quadrant of TM  AD: cerumen removed with alligator   Eyes: The ocular movements are full and symmetric, the conjunctiva is unremarkable; sclera are anicteric, pupillary response is symmetric. No nystagmus is found. Nose: The external nasal contour is normal  Neck: The neck has a normal contour; no masses are found on palpation. Diagnosis Orders   1. Bilateral impacted cerumen     2. Sensorineural hearing loss (SNHL) of right ear with restricted hearing of left ear     3. Mixed conductive and sensorineural hearing loss of left ear with restricted hearing of right ear     4.  H/O mastoidectomy         ASSESSMENT:  No evidence on CT of recurrent choleseatoma  Mixed HL AS  Right severe adhesive atelectasis      PLAN:  Offered referral for ear surgeon for possible audio rehab options but she is not interested in more surgery  Stable left ear   Continue q3mo routine cleaning   Audio 8/2021    Right adhesive atelectasis, stable    Continue HA AD   Monitor   No cholesteatoma        Future Appointments   Date Time Provider Johnny Ashford   3/16/2021 11:30 AM [facility] MRI ROOM [facility] [place]   6/15/2021  2:40 PM MD EULALIA Brandon DPP   6/21/2021  8:20 AM MD Reuben Del Rio DPP   9[facility]2021 [facility]   2022 10:30 AM AARTI Hurley [facility]     No orders of the defined types were placed in this encounter.

## 2021-03-16 ENCOUNTER — HOSPITAL ENCOUNTER (OUTPATIENT)
Dept: MRI IMAGING | Age: 61
Discharge: HOME | End: 2021-03-18
Payer: MEDICAID

## 2021-03-16 DIAGNOSIS — M54.16 LUMBAR RADICULOPATHY: ICD-10-CM

## 2021-03-16 PROCEDURE — 72148 MRI LUMBAR SPINE W/O DYE: CPT

## 2021-03-17 ENCOUNTER — TELEPHONE (OUTPATIENT)
Dept: FAMILY MEDICINE CLINIC | Age: 61
End: 2021-03-17

## 2021-03-17 DIAGNOSIS — M48.061 NARROWING OF LUMBAR SPINE: Primary | ICD-10-CM

## 2021-03-17 NOTE — TELEPHONE ENCOUNTER
----- Message from [facility], [address] sent at 3/17/2021 10:44 AM EDT -----  Multilevel degenerative changes with mild left foraminal narrowing at L5-S1. Recommend evaluation with pain management.

## 2021-03-22 ENCOUNTER — OFFICE VISIT (OUTPATIENT)
Dept: PAIN MANAGEMENT | Age: 61
End: 2021-03-22
Payer: MEDICAID

## 2021-03-22 VITALS
WEIGHT: 136 LBS | SYSTOLIC BLOOD PRESSURE: 120 MMHG | DIASTOLIC BLOOD PRESSURE: 78 MMHG | HEIGHT: 64 IN | BODY MASS INDEX: 23.22 KG/M2

## 2021-03-22 DIAGNOSIS — M54.42 CHRONIC BILATERAL LOW BACK PAIN WITH BILATERAL SCIATICA: Primary | ICD-10-CM

## 2021-03-22 DIAGNOSIS — M62.838 MUSCLE SPASM: ICD-10-CM

## 2021-03-22 DIAGNOSIS — G89.29 CHRONIC BILATERAL LOW BACK PAIN WITH BILATERAL SCIATICA: Primary | ICD-10-CM

## 2021-03-22 DIAGNOSIS — M54.16 LUMBAR RADICULOPATHY: ICD-10-CM

## 2021-03-22 DIAGNOSIS — M54.41 CHRONIC BILATERAL LOW BACK PAIN WITH BILATERAL SCIATICA: Primary | ICD-10-CM

## 2021-03-22 DIAGNOSIS — M46.1 BILATERAL SACROILIITIS (HCC): ICD-10-CM

## 2021-03-22 PROCEDURE — 99213 OFFICE O/P EST LOW 20 MIN: CPT | Performed by: NURSE PRACTITIONER

## 2021-03-22 PROCEDURE — 99204 OFFICE O/P NEW MOD 45 MIN: CPT | Performed by: NURSE PRACTITIONER

## 2021-03-22 PROCEDURE — 4004F PT TOBACCO SCREEN RCVD TLK: CPT | Performed by: NURSE PRACTITIONER

## 2021-03-22 PROCEDURE — 3017F COLORECTAL CA SCREEN DOC REV: CPT | Performed by: NURSE PRACTITIONER

## 2021-03-22 PROCEDURE — G8427 DOCREV CUR MEDS BY ELIG CLIN: HCPCS | Performed by: NURSE PRACTITIONER

## 2021-03-22 PROCEDURE — G8484 FLU IMMUNIZE NO ADMIN: HCPCS | Performed by: NURSE PRACTITIONER

## 2021-03-22 PROCEDURE — G8420 CALC BMI NORM PARAMETERS: HCPCS | Performed by: NURSE PRACTITIONER

## 2021-03-22 RX ORDER — CYCLOBENZAPRINE HCL 10 MG
10 TABLET ORAL 2 TIMES DAILY PRN
Qty: 60 TABLET | Refills: 5 | Status: SHIPPED | OUTPATIENT
Start: 2021-03-22 | End: 2022-03-09 | Stop reason: SDUPTHER

## 2021-03-22 RX ORDER — CYCLOBENZAPRINE HCL 10 MG
TABLET ORAL
Qty: 45 TABLET | Refills: 0 | Status: SHIPPED | OUTPATIENT
Start: 2021-03-22 | End: 2021-03-22 | Stop reason: SDUPTHER

## 2021-03-22 RX ORDER — DICLOFENAC SODIUM 75 MG/1
75 TABLET, DELAYED RELEASE ORAL 2 TIMES DAILY
Qty: 60 TABLET | Refills: 3 | Status: SHIPPED | OUTPATIENT
Start: 2021-03-22 | End: 2021-04-01 | Stop reason: ALTCHOICE

## 2021-03-22 ASSESSMENT — ENCOUNTER SYMPTOMS
SORE THROAT: 0
BACK PAIN: 1
SHORTNESS OF BREATH: 0
GASTROINTESTINAL NEGATIVE: 1

## 2021-03-22 NOTE — PROGRESS NOTES
Subjective:      Patient ID: Alfredo Duncan is a 62 y.o. female. Chief Complaint   Patient presents with    New Patient     DDD - narrowing of the lumbar spine.  Pain     mid back down both legs to the toes. HPI  Initial new patient consult, neck and back pain. Radicular pain back of legs    Taking ibuprofen 600mg bid and tylenol prn without much relief. Flexeril did provide some relief, would like a refill, no side effects.      Pain Assessment  Location of Pain: Back  Location Modifiers: Inferior(down both legs and into toes. )  Severity of Pain: 9  Quality of Pain: Sharp  Duration of Pain: Persistent  Frequency of Pain: Intermittent(worse during work)  Aggravating Factors: Bending, Stretching, Straightening, Exercise, Kneeling, Squatting, Standing, Walking, Stairs  Limiting Behavior: Some  Relieving Factors: Rest, Heat(soaking in the tub. )    No Known Allergies    Outpatient Medications Marked as Taking for the 3/22/21 encounter (Office Visit) with AARTI Coates CNP   Medication Sig Dispense Refill    cyclobenzaprine (FLEXERIL) 10 MG tablet take 1 tablet by mouth three times a day if needed for muscle spasm 45 tablet 0    lisinopril (PRINIVIL;ZESTRIL) 10 MG tablet take 1 tablet by mouth once daily 30 tablet 5    atorvastatin (LIPITOR) 40 MG tablet Take 1 tablet by mouth daily 30 tablet 5    vitamin D (ERGOCALCIFEROL) 1.25 MG (65511 UT) CAPS capsule Take 1 capsule by mouth once a week 16 capsule 3    ibuprofen (ADVIL;MOTRIN) 600 MG tablet Take 1 tablet by mouth 3 times daily as needed for Pain 90 tablet 0    albuterol sulfate  (90 Base) MCG/ACT inhaler Inhale 2 puffs into the lungs every 6 hours as needed for Wheezing 1 Inhaler 1    Glucosamine-Chondroit-Vit C-Mn (GLUCOSAMINE 1500 COMPLEX PO) Take by mouth daily      CRANBERRY PO Take by mouth daily      Multiple Vitamins-Minerals (HAIR/SKIN/NAILS PO) Take by mouth daily      ascorbic acid (VITAMIN C) 500 MG tablet Take 500 mg by mouth daily. Past Medical History:   Diagnosis Date    Abnormal Pap smear of cervix 1970    before her hysterectomy in 1972    CAD (coronary artery disease)     Chronic kidney disease     COPD (chronic obstructive pulmonary disease) ([address]     Hemorrhoids     History of herpes zoster 2002    (right lower rib area laterally and anteriorly).  History of thyroiditis 1992    History of ulcerative colitis     (of 20 years disease - though quiescent).  Hypercholesterolemia     Hypertension     Neuritis     Shortened [place] interval     (on EKG from 2004).  Tobacco abuse     Venous insufficiency, peripheral     (bilateral). Past Surgical History:   Procedure Laterality Date    APPENDECTOMY  1982    (Dr. Dasha Alanis) - Acute appendicitis.  CARDIAC CATHETERIZATION  2000    Preserved left ventricular systolic function. Ejection fraction 50%. Noncritical coronary disease. (Dr. Juan Antonio Greco).  CHOLECYSTECTOMY  2001    COLONOSCOPY  1988    (Dr. Eva Olsen) - Normal colon.  COLONOSCOPY  5/12/16    Polyp distal sigmoid, rectum - Dr Arturo Trimble Left 8/5/2019    CYSTO Left Stent Insertion performed by Gwendalyn Nageotte, MD at [facility]  2010    Incision and drainage of thrombosed hemorrhoid.  HYSTERECTOMY  2001    (no BSO).  LITHOTRIPSY Left 8/19/2019    CYSTO W/ STENT REMOVAL ET Left LITHOTRIPSY performed by Gwendalyn Nageotte, MD at [facility] [place] surgery x8.    OTHER SURGICAL HISTORY  1987    Pelvic exam under anesthesia. (Dr. Iliana Carrillo) - Chronic pelvic pain, chronic inflammatory process changes.  TUBAL LIGATION  1982       Family History   Problem Relation Age of Onset    Diabetes Mother     Other Mother         (history of polyps).    [facility] Arthritis Mother     Cancer Mother         breast    Breast Cancer Mother 79    COPD Father     Other Father         ('s disease)    Mental Illness Daughter         bipolar; schizophrenia Social History     Socioeconomic History    Marital status:      Spouse name: None    Number of children: None    Years of education: None    Highest education level: None   Occupational History    None   Social Needs    Financial resource strain: None    Food insecurity     Worry: None     Inability: None    Transportation needs     Medical: None     Non-medical: None   Tobacco Use    Smoking status: Current Every Day Smoker     Packs/day: 1.00     Years: 15.00     Pack years: 15.00     Start date: 1/1/1981    Smokeless tobacco: Never Used    Tobacco comment: Jai Mchugh RRT 8/4/19   Substance and Sexual Activity    Alcohol use: Yes     Alcohol/week: 0.0 standard drinks     Frequency: Monthly or less     Drinks per session: 1 or 2     Binge frequency: Never     Comment: occasional    Drug use: No    Sexual activity: Yes     Partners: Male   Lifestyle    Physical activity     Days per week: None     Minutes per session: None    Stress: None   Relationships    Social connections     Talks on phone: None     Gets together: None     Attends Denominational service: None     Active member of club or organization: None     Attends meetings of clubs or organizations: None     Relationship status: None    Intimate partner violence     Fear of current or ex partner: None     Emotionally abused: None     Physically abused: None     Forced sexual activity: None   Other Topics Concern    None   Social History Narrative    None     Review of Systems   Constitutional: Positive for activity change. HENT: Negative for sore throat. Respiratory: Negative for shortness of breath. Cardiovascular: Negative for chest pain and leg swelling. Gastrointestinal: Negative. Endocrine: Negative for cold intolerance and heat intolerance. Genitourinary: Negative. Musculoskeletal: Positive for arthralgias, back pain, myalgias and neck pain. Negative for joint swelling. Skin: Negative. Cedrick Kothari is a  hemangioma in the L3 vertebral body. Cedrick Taye is otherwise age-appropriate bone  marrow signal. Cedrick Taye is multilevel degenerative disc disease with loss of  disc signal. Herchel Taye is no disc space narrowing.  There is no  spondylolisthesis.     SPINAL CORD: The conus medullaris is normal in caliber and signal and  terminates at the L1-2 level.  The cauda equina is unremarkable.     SOFT TISSUES: The posterior paraspinal soft tissues are unremarkable.  The  visualized abdominal structures are unremarkable.     L1-L2: There is a mild circumferential disc bulge.  There is no canal  stenosis or foraminal narrowing.     L2-L3: There is a mild circumferential disc bulge.  There is no canal  stenosis or foraminal narrowing.     L3-L4: There is a mild circumferential disc bulge with facet hypertrophy. There is no canal stenosis or foraminal narrowing.     L4-L5: There is a mild circumferential disc bulge.  There is no canal  stenosis or foraminal narrowing.     L5-S1: There is a circumferential disc bulge with posterior annular tear. There is no canal stenosis or right foraminal narrowing.  There is mild left  foraminal narrowing.        Impression  Multilevel degenerative change with mild left foraminal narrowing at L5-S1. Skin:     General: Skin is warm and dry. Capillary Refill: Capillary refill takes less than 2 seconds. Coloration: Skin is not ashen, jaundiced or pale. Findings: No rash. Nails: There is no clubbing. Neurological:      Mental Status: She is alert and oriented to person, place, and time. GCS: GCS eye subscore is 4. GCS verbal subscore is 5. GCS motor subscore is 6. Cranial Nerves: No cranial nerve deficit. Comments: Positive bilateral SLR   Psychiatric:         Attention and Perception: Attention and perception normal.         Speech: Speech normal.         Behavior: Behavior normal. Behavior is cooperative.          Cognition and Memory: Cognition and memory normal.         Judgment: Judgment normal.         Assessment / Plan:      Diagnosis Orders   1. Chronic bilateral low back pain with bilateral sciatica  Ambulatory referral to Physical Therapy   2. Lumbar radiculopathy  Ambulatory referral to Physical Therapy   3. Bilateral sacroiliitis [facility])  Ambulatory referral to Physical Therapy   4.  Muscle spasm  cyclobenzaprine (FLEXERIL) 10 MG tablet     Lumbar spine MRI reviewed  Flexeril refill  Stop ibuprofen, start diclofenac 75mg bid  PT eval and treat  Follow up 6 weeks, consider injections, gabapentin

## 2021-04-01 ENCOUNTER — TELEPHONE (OUTPATIENT)
Dept: FAMILY MEDICINE CLINIC | Age: 61
End: 2021-04-01

## 2021-04-01 RX ORDER — MELOXICAM 15 MG/1
15 TABLET ORAL DAILY
COMMUNITY
End: 2022-03-09 | Stop reason: ALTCHOICE

## 2021-04-01 NOTE — TELEPHONE ENCOUNTER
Not taking diclofenac or motrin as they ar not working. Wants to go back on mobic as that has helped in past

## 2021-04-18 ENCOUNTER — OFFICE VISIT (OUTPATIENT)
Dept: PRIMARY CARE CLINIC | Age: 61
End: 2021-04-18
Payer: MEDICAID

## 2021-04-18 VITALS
WEIGHT: 134.6 LBS | BODY MASS INDEX: 22.98 KG/M2 | TEMPERATURE: 98.9 F | RESPIRATION RATE: 16 BRPM | SYSTOLIC BLOOD PRESSURE: 104 MMHG | HEIGHT: 64 IN | DIASTOLIC BLOOD PRESSURE: 72 MMHG | OXYGEN SATURATION: 97 % | HEART RATE: 102 BPM

## 2021-04-18 DIAGNOSIS — L03.032 CELLULITIS OF TOE OF LEFT FOOT: Primary | ICD-10-CM

## 2021-04-18 PROCEDURE — 99213 OFFICE O/P EST LOW 20 MIN: CPT | Performed by: FAMILY MEDICINE

## 2021-04-18 PROCEDURE — 3017F COLORECTAL CA SCREEN DOC REV: CPT | Performed by: FAMILY MEDICINE

## 2021-04-18 PROCEDURE — 99212 OFFICE O/P EST SF 10 MIN: CPT | Performed by: FAMILY MEDICINE

## 2021-04-18 PROCEDURE — 4004F PT TOBACCO SCREEN RCVD TLK: CPT | Performed by: FAMILY MEDICINE

## 2021-04-18 PROCEDURE — G8420 CALC BMI NORM PARAMETERS: HCPCS | Performed by: FAMILY MEDICINE

## 2021-04-18 PROCEDURE — G8427 DOCREV CUR MEDS BY ELIG CLIN: HCPCS | Performed by: FAMILY MEDICINE

## 2021-04-18 RX ORDER — DOXYCYCLINE HYCLATE 100 MG
100 TABLET ORAL 2 TIMES DAILY
Qty: 20 TABLET | Refills: 0 | Status: SHIPPED | OUTPATIENT
Start: 2021-04-18 | End: 2021-04-28 | Stop reason: ALTCHOICE

## 2021-04-18 ASSESSMENT — ENCOUNTER SYMPTOMS
RESPIRATORY NEGATIVE: 1
COLOR CHANGE: 1
EYES NEGATIVE: 1

## 2021-04-18 NOTE — PROGRESS NOTES
2021     Leighann Schulte (:  1960) is a 62 y.o. female, here for evaluation of the following medical concerns: Toe Pain   The incident occurred 5 to 7 days ago (about a week ago was trying to pick out the corner of her left 5th toe as it was ingrown and it has since become infected). There was no injury mechanism. The pain is present in the left toes (left 5th toe). The quality of the pain is described as aching. The pain is moderate. The pain has been constant since onset. Associated symptoms include an inability to bear weight (pain with weight bearing. Is able to bear weight but has discomfort). Associated symptoms comments: Redness and seeping from the medial aspect of the nail edge. The symptoms are aggravated by movement, palpation and weight bearing. Treatments tried: soaked in warm water and peroxide. The treatment provided mild relief. Did review patient's med list, allergies, social history,pmhx and pshx today as noted in the record. Review of Systems   Constitutional: Negative for chills, fatigue and fever. HENT: Negative. Eyes: Negative. Respiratory: Negative. Cardiovascular: Negative. Negative for palpitations. Skin: Positive for color change and wound. Prior to Visit Medications    Medication Sig Taking?  Authorizing Provider   meloxicam (MOBIC) 15 MG tablet Take 15 mg by mouth daily Yes Historical Provider, MD   cyclobenzaprine (FLEXERIL) 10 MG tablet Take 1 tablet by mouth 2 times daily as needed for Muscle spasms Yes AARTI Guerra - CNP   lisinopril (PRINIVIL;ZESTRIL) 10 MG tablet take 1 tablet by mouth once daily Yes RENATO Galeas   atorvastatin (LIPITOR) 40 MG tablet Take 1 tablet by mouth daily Yes RENATO Galeas   vitamin D (ERGOCALCIFEROL) 1.25 MG (81857 UT) CAPS capsule Take 1 capsule by mouth once a week Yes AARTI Espinosa CNM   albuterol sulfate  (90 Base) MCG/ACT inhaler Inhale 2 puffs into the lungs every 6 hours as needed for Wheezing Yes Jonathan Miner MD   Glucosamine-Chondroit-Vit C-Mn (GLUCOSAMINE 1500 COMPLEX PO) Take by mouth daily Yes Historical Provider, MD   CRANBERRY PO Take by mouth daily Yes Historical Provider, MD   Multiple Vitamins-Minerals (HAIR/SKIN/NAILS PO) Take by mouth daily Yes Historical Provider, MD   ascorbic acid (VITAMIN C) 500 MG tablet Take 500 mg by mouth daily. Yes Historical Provider, MD        Social History     Tobacco Use    Smoking status: Current Every Day Smoker     Packs/day: 1.00     Years: 15.00     Pack years: 15.00     Start date: 1/1/1981    Smokeless tobacco: Never Used    Tobacco comment: Delisa Cloud [facility] 8/4/19   Substance Use Topics    Alcohol use: Yes     Alcohol/week: 0.0 standard drinks     Frequency: Monthly or less     Drinks per session: 1 or 2     Binge frequency: Never     Comment: occasional        Vitals:    04/18/21 1303   BP: 104/72   Pulse: 102   Resp: 16   Temp: 98.9 °F (37.2 °C)   TempSrc: Tympanic   SpO2: 97%   Weight: 134 lb 9.6 oz (61.1 kg)   Height: 5' 4\" (1.626 m)     Estimated body mass index is 23.1 kg/m² as calculated from the following:    Height as of this encounter: 5' 4\" (1.626 m). Weight as of this encounter: 134 lb 9.6 oz (61.1 kg). Physical Exam  Vitals signs and nursing note reviewed. Constitutional:       General: She is not in acute distress. Appearance: She is well-developed. She is not diaphoretic. HENT:      Head: Normocephalic and atraumatic. Eyes:      Conjunctiva/sclera: Conjunctivae normal.   Neck:      Musculoskeletal: Normal range of motion. Pulmonary:      Effort: Pulmonary effort is normal.   Musculoskeletal:         General: Swelling and tenderness present. Comments: Swelling erythema and tenderness noted to the medial aspect of the left 5th toenail. There is ingrowing of the medial edge of the nail. There is slight clear seeping noted. Skin:     General: Skin is warm and dry. Coloration: Skin is not pale. Findings: No erythema or rash. Neurological:      Mental Status: She is alert and oriented to person, place, and time. Psychiatric:         Behavior: Behavior normal.         Thought Content: Thought content normal.         Judgment: Judgment normal.         ASSESSMENT/PLAN:    Encounter Diagnosis   Name Primary?  Cellulitis of toe of left foot Yes     Orders Placed This Encounter   Medications    doxycycline hyclate (VIBRA-TABS) 100 MG tablet     Sig: Take 1 tablet by mouth 2 times daily     Dispense:  20 tablet     Refill:  0     RX as noted above. I did wedge the distal corner of the nail using sterile nail clippers and was able to get the corner of the nail removed with mild discomfort to the patient. Patient is to continue with warm water soaks and can use either peroxide or epsom salts. Patient is to use antibiotic ointment on the area and cover with bandaid. Return  if no improvement in symptoms or if any further symptoms arise. No follow-ups on file. An electronic signature was used to authenticate this note.     --Angie Short,  on 4/18/2021 at 1:07 PM

## 2021-04-28 ENCOUNTER — OFFICE VISIT (OUTPATIENT)
Dept: PODIATRY | Age: 61
End: 2021-04-28
Payer: MEDICAID

## 2021-04-28 VITALS
SYSTOLIC BLOOD PRESSURE: 120 MMHG | DIASTOLIC BLOOD PRESSURE: 70 MMHG | HEIGHT: 64 IN | BODY MASS INDEX: 22.88 KG/M2 | WEIGHT: 134 LBS | HEART RATE: 68 BPM

## 2021-04-28 DIAGNOSIS — L03.032 PARONYCHIA, TOE, LEFT: ICD-10-CM

## 2021-04-28 DIAGNOSIS — L60.0 OC (ONYCHOCRYPTOSIS): Primary | ICD-10-CM

## 2021-04-28 DIAGNOSIS — L60.8 DEFORMITY OF NAIL BED: ICD-10-CM

## 2021-04-28 PROCEDURE — 11750 EXCISION NAIL&NAIL MATRIX: CPT | Performed by: PODIATRIST

## 2021-04-28 PROCEDURE — 99999 PR OFFICE/OUTPT VISIT,PROCEDURE ONLY: CPT | Performed by: PODIATRIST

## 2021-04-28 NOTE — PROGRESS NOTES
Subjective:  Tnea Lowry is a 62 y.o. female who presents to the office today complaining of an ingrown nail. Symptoms began 3 week(s) ago. Patient relates pain is Present. Pain is rated 7 out of 10 and is described as moderate. Treatments prior to today's visit include: po abx, cutting out nail edge . Patient also adds she has had chronic pain in the outside nail from rubbing. No treatment tried for that. Currently denies F/C/N/V. No Known Allergies    Past Medical History:   Diagnosis Date    Abnormal Pap smear of cervix 1970    before her hysterectomy in 1972    CAD (coronary artery disease)     Chronic kidney disease     COPD [facility]ch[facility] obstructive pulmonary disease) ([facility] [address]     Hemorrhoids     History of herpes zoster 2002    (right lower rib area laterally and anteriorly).  History of thyroiditis 1992    History of ulcerative colitis     (of 20 years disease - though quiescent).  Hypercholesterolemia     Hypertension     Neuritis     Shortened [place] interval     (on EKG from 2004).  Tobacco abuse     Venous insufficiency, peripheral     (bilateral). Prior to Admission medications    Medication Sig Start Date End Date Taking?  Authorizing Provider   meloxicam (MOBIC) 15 MG tablet Take 15 mg by mouth daily   Yes Historical Provider, MD   cyclobenzaprine (FLEXERIL) 10 MG tablet Take 1 tablet by mouth 2 times daily as needed for Muscle spasms 3/22/21  Yes Lem Castleman, APRN - CNP   lisinopril (PRINIVIL;ZESTRIL) 10 MG tablet take 1 tablet by mouth once daily 3/9/21  Yes RENATO Mendez   atorvastatin (LIPITOR) 40 MG tablet Take 1 tablet by mouth daily 3/9/21  Yes Alexandria Benítez, [address]   vitamin D (ERGOCALCIFEROL) 1.25 MG (07373 UT) CAPS capsule Take 1 capsule by mouth once a week 3/8/21  Yes AARTI Mcghee CNM   albuterol sulfate  (90 Base) MCG/ACT inhaler Inhale 2 puffs into the lungs every 6 hours as needed for Wheezing 4/3/20  Yes Kacey Mitchell MD Glucosamine-Chondroit-Vit C-Mn (GLUCOSAMINE 1500 COMPLEX PO) Take by mouth daily   Yes Historical Provider, MD   CRANBERRY PO Take by mouth daily   Yes Historical Provider, MD   Multiple Vitamins-Minerals (HAIR/SKIN/NAILS PO) Take by mouth daily   Yes Historical Provider, MD   ascorbic acid (VITAMIN C) 500 MG tablet Take 500 mg by mouth daily. Yes Historical Provider, MD     ROS: All 14 ROS systems reviewed and pertinent positives noted above, all others negative. Objective:  Patient is alert and oriented. Vascular: DP and PT pulses palpable 2/4, bilateral.  CFT <3 seconds, bilateral.  Hair growth present to the level of the digits, bilateral.  Edema absent, bilateral.  Varicosities absent, bilateral. Erythema absent, bilateral. Distal Rubor absent bilateral.  Temperature within normal limits bilateral. Hyperpigmentation absent bilateral. No atrophic skin. Neuro: Protective sensation is intact. Reflexes WNL. Musculoskeletal:  Pain present upon palpation of left, lateral, medial, 5th toe. Muscle strength 5/5, Bilateral. within normal limits medial longitudinal arch, Bilateral. ROM WNL. Integument: Onychocryptosis present left, medial, 5th toe. Granuloma is absent left. Paronychia changes with drainage and crust are present left, medial, 5th toe. Callus skin groove also seen lateral left fifth toe nail edge Skin color, texture, turgor normal. No rashes or lesions. .    Assessment:   Diagnosis Orders   1. OC (onychocryptosis)  NJ REMOVAL OF NAIL BED   2. Deformity of nail bed  NJ REMOVAL OF NAIL BED   3. Paronychia, toe, left  NJ REMOVAL OF NAIL BED       Plan:  Patient was educated on all treatment options. Risks and complications were discussed with the patient and they opted for a phenol matrixectomy nail procedure on the left, 5th toe. Verbal and signed consent took place. A total of 3cc 1% lidocaine plain was used to anesthetize the left, 5th toe.   It was then prepped and draped in the usual sterile manner. Anesthesia was checked and was adequate. The left, 5th toe nail border and matrix was removed. No remaining nail spicules. Three consecutive 30 seconds applications of 31% phenol were then applied to the nail matrix with an applicator. Rinsed with normal saline. A dry sterile dressing of silvadene or antibiotic ointment with telfa and coban took place. Patient will leave dry and intact for 24 hours then start soaking bid in warm soapy water or epsom salts then apply the ointment and a band aid for the first week then continue once per day for the second week. Patient will use OTC anti-inflammatory or tylenol PRN for pain. Post nail procedure instructions were given. Any signs of infections and patient should be see back in the office immediately. Orders Placed This Encounter   Medications    silver sulfADIAZINE (SILVADENE) 1 % cream     Sig: Apply topically daily. Dispense:  30 g     Refill:  1     smoking cessation advice given. Importance in relation to wound healing discussed. Pt should follow with PCP for potential assistance in quitting. Patient educated about risk of the wound healing but there is normal capillary refill time and hair growth into the toes.

## 2021-05-11 ENCOUNTER — OFFICE VISIT (OUTPATIENT)
Dept: PODIATRY | Age: 61
End: 2021-05-11
Payer: MEDICAID

## 2021-05-11 VITALS
HEART RATE: 68 BPM | BODY MASS INDEX: 23.79 KG/M2 | DIASTOLIC BLOOD PRESSURE: 70 MMHG | WEIGHT: 138.6 LBS | SYSTOLIC BLOOD PRESSURE: 122 MMHG | RESPIRATION RATE: 20 BRPM

## 2021-05-11 DIAGNOSIS — S91.109A OPEN WOUND OF TOE, INITIAL ENCOUNTER: Primary | ICD-10-CM

## 2021-05-11 PROCEDURE — 99213 OFFICE O/P EST LOW 20 MIN: CPT | Performed by: PODIATRIST

## 2021-05-11 PROCEDURE — G8427 DOCREV CUR MEDS BY ELIG CLIN: HCPCS | Performed by: PODIATRIST

## 2021-05-11 PROCEDURE — 3017F COLORECTAL CA SCREEN DOC REV: CPT | Performed by: PODIATRIST

## 2021-05-11 PROCEDURE — G8420 CALC BMI NORM PARAMETERS: HCPCS | Performed by: PODIATRIST

## 2021-05-11 PROCEDURE — 4004F PT TOBACCO SCREEN RCVD TLK: CPT | Performed by: PODIATRIST

## 2021-05-11 NOTE — PROGRESS NOTES
Subjective:  Luis M Vizcarra is a 62 y.o. female who presents to the office today complaining of soreness l 5th toe. . Patient thought was doing pretty well immediately after the nail procedure then week ago got more sore. Lasted for 2 days and now the past couple days feels a lot better. Patient denies seeing any signs of infection at home. Patient is worried that infection was setting in. Symptoms began 1 week(s) ago. Patient relates pain is Present. Pain is rated 1 out of 10 and is described as mild. Treatments prior to today's visit include: soaking, ointment as advised  Currently denies F/C/N/V. No Known Allergies    Past Medical History:   Diagnosis Date    Abnormal Pap smear of cervix 1970    before her hysterectomy in 1972    CAD (coronary artery disease)     Chronic kidney disease     [facility] pulmonary disease) ([facility] [address]     Hemorrhoids     History of herpes zoster 2002    (right lower rib area laterally and anteriorly).  History of thyroiditis 1992    History of ulcerative colitis     (of 20 years disease - though quiescent).  Hypercholesterolemia     Hypertension     Neuritis     Shortened [place] interval     (on EKG from 2004).  Tobacco abuse     Venous insufficiency, peripheral     (bilateral). Prior to Admission medications    Medication Sig Start Date End Date Taking? Authorizing Provider   silver sulfADIAZINE (SILVADENE) 1 % cream Apply topically daily.  4/28/21  Yes Santo Baires DPM   meloxicam (MOBIC) 15 MG tablet Take 15 mg by mouth daily   Yes Historical Provider, MD   cyclobenzaprine (FLEXERIL) 10 MG tablet Take 1 tablet by mouth 2 times daily as needed for Muscle spasms 3/22/21  Yes AARTI Solaers - PA   lisinopril (PRINIVIL;ZESTRIL) 10 MG tablet take 1 tablet by mouth once daily 3/9/21  Yes RENATO Pierre   atorvastatin (LIPITOR) 40 MG tablet Take 1 tablet by mouth daily 3/9/21  Yes RENATO Pierre   vitamin D (ERGOCALCIFEROL) 1.25 MG (93016 UT) CAPS capsule Take 1 capsule by mouth once a week 3/8/21  Yes AARTI Chiang - LAURA   albuterol sulfate  (90 Base) MCG/ACT inhaler Inhale 2 puffs into the lungs every 6 hours as needed for Wheezing 4/3/20  Yes Ronald Chase MD   Glucosamine-Chondroit-Vit C-Mn (GLUCOSAMINE 1500 COMPLEX PO) Take by mouth daily   Yes Historical Provider, MD   CRANBERRY PO Take by mouth daily   Yes Historical Provider, MD   Multiple Vitamins-Minerals (HAIR/SKIN/NAILS PO) Take by mouth daily   Yes Historical Provider, MD   ascorbic acid (VITAMIN C) 500 MG tablet Take 500 mg by mouth daily. Yes Historical Provider, MD       Past Surgical History:   Procedure Laterality Date    APPENDECTOMY  1982    (Dr. Carmelo Mcnulty) - Acute appendicitis.  CARDIAC CATHETERIZATION  2000    Preserved left ventricular systolic function. Ejection fraction 50%. Noncritical coronary disease. (Dr. Alvaro Pearce).  CHOLECYSTECTOMY  2001    COLONOSCOPY  1988    (Dr. Tequila Magaña) - Normal colon.  COLONOSCOPY  5/12/16    Polyp distal sigmoid, rectum - Dr Linda Santiago Left 8/5/2019    CYSTO Left Stent Insertion performed by Cuca Lozano MD at [place] [address]  2010    Incision and drainage of thrombosed hemorrhoid.  HYSTERECTOMY  2001    (no BSO).  LITHOTRIPSY Left 8/19/2019    CYSTO W/ STENT REMOVAL ET Left LITHOTRIPSY performed by Cuca Lozano MD at [facility] surgery x8.    OTHER SURGICAL HISTORY  1987    Pelvic exam under anesthesia. (Dr. Barrie Emerson) - Chronic pelvic pain, chronic inflammatory process changes.  TUBAL LIGATION  1982       Family History   Problem Relation Age of Onset    Diabetes Mother     Other Mother         (history of polyps).    [employer] Arthritis Mother     Cancer Mother         breast    Breast Cancer Mother 79    COPD Father     Other Father         ('s disease)    Mental Illness Daughter         bipolar; schizophrenia       Social History     Tobacco Use    Smoking status: Current Every Day Smoker     Packs/day: 1.00     Years: 15.00     Pack years: 15.00     Start date: 1/1/1981    Smokeless tobacco: Never Used    Tobacco comment: Geoffrey Alfredo LOZADA 8/4/19   Substance Use Topics    Alcohol use: Yes     Alcohol/week: 0.0 standard drinks     Frequency: Monthly or less     Drinks per session: 1 or 2     Binge frequency: Never     Comment: occasional       ROS: All 14 ROS systems reviewed and pertinent positives noted above, all others negative. Lower Extremity Physical Examination:     Vitals:   Vitals:    05/11/21 1536   BP: 122/70   Pulse: 68   Resp: 20     General: AAO x 3 in NAD. Vascular: DP and PT pulses palpable 2/4, bilateral.  CFT <3 seconds, bilateral.  Hair growth present to the level of the digits, bilateral.  Edema absent, bilateral.  Varicosities absent, bilateral. Erythema absent, bilateral. Distal Rubor absent bilateral.  Temperature within normal limits bilateral. Hyperpigmentation absent bilateral. No atrophic skin. Neurological: Sensation intact to light touch to level of digits, bilateral.  Protective sensation intact 10/10 sites via 5.07/10g Mobile-Serafin Monofilament, bilateral.  negative Tinel's, bilateral.  negative Valleix sign, bilateral.  Vibratory intact bilateral.  Reflexes Decreased bilateral.  Paresthesias negative. Dysthesias negative. Sharp/dull intact bilateral.    Musculoskeletal: Muscle strength 5/5, bilateral.  Pain absent upon palpation bilateral. Normal medial longitudinal arch, bilateral.  Ankle ROM within normal limits,bilateral.  1st MPJ ROM within normal limits, bilateral.  Dorsally contracted digits absent. No other foot deformities. Integument: Warm, dry, supple, bilateral.  Open lesion present, L 5th toe proximal nail bed. No signs of abscess. Minimal nonviable tissue debrided to healthy tissue base. No signs of paronychia whatsoever. .  Interdigital maceration absent to web spaces

## 2021-06-21 ENCOUNTER — HOSPITAL ENCOUNTER (OUTPATIENT)
Age: 61
Setting detail: SPECIMEN
Discharge: HOME | End: 2021-06-21
Payer: MEDICAID

## 2021-06-21 ENCOUNTER — HOSPITAL ENCOUNTER (OUTPATIENT)
Dept: GENERAL RADIOLOGY | Age: 61
Discharge: HOME | End: 2021-06-23
Payer: MEDICAID

## 2021-06-21 ENCOUNTER — OFFICE VISIT (OUTPATIENT)
Dept: UROLOGY | Age: 61
End: 2021-06-21
Payer: MEDICAID

## 2021-06-21 VITALS
HEIGHT: 64 IN | BODY MASS INDEX: 22.36 KG/M2 | HEART RATE: 64 BPM | SYSTOLIC BLOOD PRESSURE: 116 MMHG | WEIGHT: 131 LBS | DIASTOLIC BLOOD PRESSURE: 72 MMHG

## 2021-06-21 DIAGNOSIS — N20.0 KIDNEY STONES: ICD-10-CM

## 2021-06-21 DIAGNOSIS — N39.0 RECURRENT UTI: ICD-10-CM

## 2021-06-21 DIAGNOSIS — Z87.440 HISTORY OF UTI: ICD-10-CM

## 2021-06-21 DIAGNOSIS — N20.0 KIDNEY STONES: Primary | ICD-10-CM

## 2021-06-21 PROCEDURE — G8427 DOCREV CUR MEDS BY ELIG CLIN: HCPCS | Performed by: UROLOGY

## 2021-06-21 PROCEDURE — 3017F COLORECTAL CA SCREEN DOC REV: CPT | Performed by: UROLOGY

## 2021-06-21 PROCEDURE — 4004F PT TOBACCO SCREEN RCVD TLK: CPT | Performed by: UROLOGY

## 2021-06-21 PROCEDURE — 87077 CULTURE AEROBIC IDENTIFY: CPT

## 2021-06-21 PROCEDURE — 74018 RADEX ABDOMEN 1 VIEW: CPT

## 2021-06-21 PROCEDURE — 87086 URINE CULTURE/COLONY COUNT: CPT

## 2021-06-21 PROCEDURE — 99215 OFFICE O/P EST HI 40 MIN: CPT | Performed by: UROLOGY

## 2021-06-21 PROCEDURE — 99214 OFFICE O/P EST MOD 30 MIN: CPT | Performed by: UROLOGY

## 2021-06-21 PROCEDURE — G8420 CALC BMI NORM PARAMETERS: HCPCS | Performed by: UROLOGY

## 2021-06-21 PROCEDURE — 87186 SC STD MICRODIL/AGAR DIL: CPT

## 2021-06-21 RX ORDER — CIPROFLOXACIN 500 MG/1
500 TABLET, FILM COATED ORAL 2 TIMES DAILY
Qty: 14 TABLET | Refills: 0 | Status: SHIPPED | OUTPATIENT
Start: 2021-06-21 | End: 2021-06-28

## 2021-06-21 NOTE — PROGRESS NOTES
[address] [facility]  [address]  [place] [address]  Dept: [phone]  Dept Fax: [phone]  Loc: [phone]    SALLIE Degroot, [address] [facility] Urology Office Note - Follow Up Visit    Patient:  Sayda Mcqueen  YOB: 1960  Date: 6/21/2021    The patient is a 62 y.o. female who presents today for evaluation of the following problems:   Chief Complaint   Patient presents with    Follow-up     1 year with KUB        HISTORY OF PRESENT ILLNESS:     Kidney stones  Hx of ESWL Here with KUB. Growing 1 cm stone in left kidney  Had UTI and obstructing stone in past      Recurrent UTI  ++ symptoms of a UTI right now  Usually treats as they come. Not bothered. Requested/reviewed records from [facility] [place] office and/or outside physician/EMR    (Patient's old records have been requested, reviewed and pertinent findings summarized in today's note.)    Procedures Today: N/A    Last several PSA's:  No results found for: PSA    Last total testosterone:  No results found for: TESTOSTERONE    Urinalysis today:  No results found for this visit on 06/21/21. Last BUN and creatinine:  Lab Results   Component Value Date    BUN 19 03/08/2021     Lab Results   Component Value Date    CREATININE 1.12 (H) 03/08/2021       Additional Lab/Culture results: none    Imaging Reviewed during this Office Visit:   imaging independently reviewed by Hermila Lobo MD and radiology report verified demonstrating     No results found. Xr Abdomen (kub) (single Ap View)    Result Date: 8/5/2019  EXAMINATION: ONE SUPINE XRAY VIEW(S) OF THE ABDOMEN 8/5/2019 11:52 am COMPARISON: Abdomen earlier the same day at 624 hours.  HISTORY: ORDERING SYSTEM PROVIDED HISTORY: CYSTO WITH STENT PLACEMENT TECHNOLOGIST PROVIDED HISTORY: CYSTO WITH STENT PLACEMENT Reason for Exam: C-arm left stent placement Acuity: Acute Type of Exam: Initial 2 C-arm images FINDINGS: Images document a double pigtail COLONOSCOPY  5/12/16    Polyp distal sigmoid, rectum - Dr Linda Santiago Left 8/5/2019    CYSTO Left Stent Insertion performed by Cuca Lozano MD at [place] [address]  2010    Incision and drainage of thrombosed hemorrhoid.  HYSTERECTOMY  2001    (no BSO).  LITHOTRIPSY Left 8/19/2019    CYSTO W/ STENT REMOVAL ET Left LITHOTRIPSY performed by Cuca Lozano MD at [address] surgery x8.    OTHER SURGICAL HISTORY  1987    Pelvic exam under anesthesia. (Dr. Barrie Emerson) - Chronic pelvic pain, chronic inflammatory process changes.  TUBAL LIGATION  1982     Family History   Problem Relation Age of Onset    Diabetes Mother     Other Mother         (history of polyps).  Arthritis Mother     Cancer Mother         breast    Breast Cancer Mother 79    COPD Father     Other Father         ('s disease)    Mental Illness Daughter         bipolar; schizophrenia     No outpatient medications have been marked as taking for the 6/21/21 encounter (Office Visit) with Cuca Lozano MD.       Patient has no known allergies.   Social History     Tobacco Use   Smoking Status Current Every Day Smoker    Packs/day: 1.00    Years: 15.00    Pack years: 15.00    Start date: 1/1/1981   Smokeless Tobacco Never Used   Tobacco Comment    S Leticia Hayes RRT 8/4/19      (If patient a smoker, smoking cessation counseling offered)   Social History     Substance and Sexual Activity   Alcohol Use Yes    Alcohol/week: 0.0 standard drinks    Comment: occasional       REVIEW OF SYSTEMS:  Constitutional: negative  Eyes: negative  Respiratory: negative  Cardiovascular: negative  Gastrointestinal: negative  Genitourinary: see HPI  Musculoskeletal: negative  Skin: negative   Neurological: negative  Hematological/Lymphatic: negative  Psychological: negative        Physical Exam:    This a 62 y.o. female  Vitals:    06/21/21 0833   BP: 116/72   Pulse: 64     Body mass index is 22.49 kg/m². Constitutional: Patient in no acute distress;         Assessment and Plan        1. Kidney stones    2. History of UTI    3. Recurrent UTI               Plan:       Doing well today. Reviewed KUB. Large about 1 cm. Will schedule L ESWL  Urine culture today  cipro empirically as patient feels she has symptoms of a UTI  If left flank pain worsens, will need CT             Prescriptions Ordered:  No orders of the defined types were placed in this encounter. Orders Placed:  No orders of the defined types were placed in this encounter.            Germania Earl MD

## 2021-06-22 LAB
CULTURE: ABNORMAL
CULTURE: ABNORMAL
Lab: ABNORMAL
SPECIMEN DESCRIPTION: ABNORMAL

## 2021-06-23 RX ORDER — CEPHALEXIN 500 MG/1
500 CAPSULE ORAL 3 TIMES DAILY
Qty: 21 CAPSULE | Refills: 0 | Status: SHIPPED | OUTPATIENT
Start: 2021-06-23 | End: 2021-06-30

## 2021-06-25 ENCOUNTER — TELEPHONE (OUTPATIENT)
Dept: UROLOGY | Age: 61
End: 2021-06-25

## 2021-06-25 RX ORDER — NITROFURANTOIN 25; 75 MG/1; MG/1
100 CAPSULE ORAL 2 TIMES DAILY
Qty: 14 CAPSULE | Refills: 0 | Status: SHIPPED | OUTPATIENT
Start: 2021-06-25 | End: 2021-07-02

## 2021-06-25 NOTE — TELEPHONE ENCOUNTER
----- Message from Trev Borja MD sent at 6/25/2021  6:50 AM EDT -----  Macrobid 100 mg seven days sent to pharmacy.  Continue cipro

## 2021-07-01 ENCOUNTER — TELEPHONE (OUTPATIENT)
Dept: UROLOGY | Age: 61
End: 2021-07-01

## 2021-07-01 NOTE — TELEPHONE ENCOUNTER
Patient calling back saying she had missed a call from one of the urology nurses yesterday (6/30/21) and is not sure what it could be about. Patient was seen on 6/21 with dr Jeanne Teresa for kidney stones, and thinks it might of been to schedule her for surgery? Please call her back at [phone].

## 2021-07-07 ENCOUNTER — TELEPHONE (OUTPATIENT)
Dept: OTOLARYNGOLOGY | Age: 61
End: 2021-07-07

## 2021-07-07 NOTE — TELEPHONE ENCOUNTER
Patient no showed appointment with Dr. Qing Albert 7/7/2021.  Writer called patient and rescheduled to 7/20/2021 at 1:40 pm.

## 2021-07-16 ENCOUNTER — TELEPHONE (OUTPATIENT)
Dept: UROLOGY | Age: 61
End: 2021-07-16

## 2021-07-16 NOTE — TELEPHONE ENCOUNTER
Patient states she had a missed call from the urology nurse, does not have voicemail so was not sure what it was regarding. She is scheduled for surgery with Dr. Lani Luna on 8/9/2021.

## 2021-07-16 NOTE — TELEPHONE ENCOUNTER
I did not call patient- she does not need anything at this time. Will call if she has further questions regarding procedure on 8/9 with Dr. Bradly Gallagher.

## 2021-07-20 ENCOUNTER — OFFICE VISIT (OUTPATIENT)
Dept: OTOLARYNGOLOGY | Age: 61
End: 2021-07-20
Payer: MEDICAID

## 2021-07-20 VITALS — HEART RATE: 72 BPM | DIASTOLIC BLOOD PRESSURE: 80 MMHG | SYSTOLIC BLOOD PRESSURE: 128 MMHG | RESPIRATION RATE: 14 BRPM

## 2021-07-20 DIAGNOSIS — H90.A21 SENSORINEURAL HEARING LOSS (SNHL) OF RIGHT EAR WITH RESTRICTED HEARING OF LEFT EAR: ICD-10-CM

## 2021-07-20 DIAGNOSIS — H61.23 BILATERAL IMPACTED CERUMEN: ICD-10-CM

## 2021-07-20 DIAGNOSIS — H90.A32 MIXED CONDUCTIVE AND SENSORINEURAL HEARING LOSS OF LEFT EAR WITH RESTRICTED HEARING OF RIGHT EAR: ICD-10-CM

## 2021-07-20 DIAGNOSIS — Z90.89 HISTORY OF LEFT MASTOIDECTOMY: Primary | ICD-10-CM

## 2021-07-20 PROCEDURE — 69220 CLEAN OUT MASTOID CAVITY: CPT | Performed by: OTOLARYNGOLOGY

## 2021-07-20 PROCEDURE — 99213 OFFICE O/P EST LOW 20 MIN: CPT | Performed by: OTOLARYNGOLOGY

## 2021-07-20 PROCEDURE — G8427 DOCREV CUR MEDS BY ELIG CLIN: HCPCS | Performed by: OTOLARYNGOLOGY

## 2021-07-20 PROCEDURE — 3017F COLORECTAL CA SCREEN DOC REV: CPT | Performed by: OTOLARYNGOLOGY

## 2021-07-20 PROCEDURE — 4004F PT TOBACCO SCREEN RCVD TLK: CPT | Performed by: OTOLARYNGOLOGY

## 2021-07-20 PROCEDURE — G8420 CALC BMI NORM PARAMETERS: HCPCS | Performed by: OTOLARYNGOLOGY

## 2021-07-20 NOTE — PROGRESS NOTES
HPI:     Fu for AS retraction pocket, has had mult AU surg including AD PORP/TORP, has been on cortisporin gtts in the past     Last seen by Dr. Marixa Cai on 12/2019. Occasional \"cricket noises\" which happens when she is due for a cleaning. Wears right HA, left HA doesn't work well due to muffled noises on the left with the HA. Last ear surgery was approx 5 years ago (2015) and she thinks it was in [address]. Has also seen otologist in [facility]. [facility]. States she has had 7 ear surgeries. Occasional left drainage but not often. Stable hearing. She was last seen 11/2020 for cleaning of the left EAC/mastoid. Here for 3 month ear check/cleaning. States she is hearing \"chirping\" and that means she needs a cleaning. CT temporal bone 8/17:  FINDINGS:    RIGHT TEMPORAL BONE:  The external auditory canal is clear without evidence    of bony erosion.  The scutum is intact.  The middle ear cavity is clear.  The    ossicular chain is intact.  The mastoid air cells are clear.  The inner ear    structures appear unremarkable.  Normal mineralization of the otic capsule.     The internal auditory canal and vestibular aqueduct appear unremarkable.  The    carotid canal is normal in appearance.  The jugular bulb is unremarkable.         LEFT TEMPORAL BONE: The external auditory canal is clear without evidence of    bony erosion.  There is postsurgical change in the region of the scutum    without definite residual soft tissue abnormality. Jamila [place] has been resection    of the ossicular chain.  There is fluid in the left mastoid air cells.  The    inner ear structures appear unremarkable.  Normal mineralization of the otic    capsule.  The internal auditory canal and vestibular aqueduct appear    unremarkable.  The carotid canal is normal in appearance.  The jugular bulb    is unremarkable.         BRAIN: The visualized portion of the intracranial contents appear    unremarkable.         ORBITS: The visualized portion of the orbits demonstrate no acute abnormality.         SINUSES: There is polyp versus mucous retention cyst in the right maxillary    sinus.              Impression    Postsurgical change to the left temporal bone region without definite    residual soft tissue abnormality.         Mild fluid in the left mastoid air cells.         Polyp versus mucous retention cyst in the right maxillary sinus. Audio 8/13/2020:  AD moderate to severe SNHL  AS severe to profound mixed HL, type B tymp    July 20, 2021     Outpatient Medications Prior to Visit   Medication Sig Dispense Refill    silver sulfADIAZINE (SILVADENE) 1 % cream Apply topically daily. 30 g 1    meloxicam (MOBIC) 15 MG tablet Take 15 mg by mouth daily      cyclobenzaprine (FLEXERIL) 10 MG tablet Take 1 tablet by mouth 2 times daily as needed for Muscle spasms 60 tablet 5    lisinopril (PRINIVIL;ZESTRIL) 10 MG tablet take 1 tablet by mouth once daily 30 tablet 5    atorvastatin (LIPITOR) 40 MG tablet Take 1 tablet by mouth daily 30 tablet 5    vitamin D (ERGOCALCIFEROL) 1.25 MG (48527 UT) CAPS capsule Take 1 capsule by mouth once a week 16 capsule 3    albuterol sulfate  (90 Base) MCG/ACT inhaler Inhale 2 puffs into the lungs every 6 hours as needed for Wheezing 1 Inhaler 1    Glucosamine-Chondroit-Vit C-Mn (GLUCOSAMINE 1500 COMPLEX PO) Take by mouth daily      CRANBERRY PO Take by mouth daily      Multiple Vitamins-Minerals (HAIR/SKIN/NAILS PO) Take by mouth daily      ascorbic acid (VITAMIN C) 500 MG tablet Take 500 mg by mouth daily. No facility-administered medications prior to visit. Drug Sensitivities: Patient has no known allergies. Blood pressure 128/80, pulse 72, resp. rate 14, not currently breastfeeding. General: The patient is found to be alert and normally responsive female with grossly normal hearing wearing AD hearing aid, clear voice and normal articulation. Communication is without difficulty.   Skin: The skin has normal colour and turgor. Face: The facial contour is symmetric at rest and with movement. Ears: The pinnae have normal contours. AS: EAC clear  AS crusting from tm and mastoid removed w alligator (lifted off with curette) on reconstructed TM, mostly superior on the TM in a crescent, keratin/debris plug at 1-2 o'clock, keratinized, no mucosa, small amount of crusting superior anterior tm, mastoid bowl with crusting, removed with alligator   AD: EAC with cerumen, severe retraction of pars flacida and tensa onto promontory, no effusion or cholesteatoma, looks like TM was reconstructed with cartilage graft in posterior quadrant of TM  AD: cerumen removed with alligator   Eyes: The ocular movements are full and symmetric, the conjunctiva is unremarkable; sclera are anicteric, pupillary response is symmetric. No nystagmus is found. Nose: The external nasal contour is normal  Neck: The neck has a normal contour; no masses are found on palpation. Diagnosis Orders   1. History of left mastoidectomy     2. 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 Sensorineural hearing loss (SNHL) of right ear with restricted hearing of left ear     4. Bilateral impacted cerumen         ASSESSMENT:  No evidence on CT of recurrent choleseatoma  Mixed HL AS  Right severe adhesive atelectasis      PLAN:  Offered referral for ear surgeon for possible audio rehab options but she is not interested in more surgery  Stable left ear   Continue q3mo routine cleaning   Audio 2021    Right adhesive atelectasis, stable    Continue HA AD   Monitor   No cholesteatoma        Future Appointments   Date Time Provider Johnny Ashford   2021 11:20 AM [employer], [address] [facility]   2022 10:30 AM AARTI Peña CNM [facility]     No orders of the defined types were placed in this encounter.     HPI:     Fu for AS retraction pocket, has had mult AU surg including AD PORP/TORP, has been on cortisporin gtts in the past     Last seen by Dr. Austin Sharma on 12/2019. Occasional \"cricket noises\" which happens when she is due for a cleaning. Wears right HA, left HA doesn't work well due to muffled noises on the left with the HA. Last ear surgery was approx 5 years ago (2015) and she thinks it was in [facility]. Has also seen otologist in [facility]. [facility]. States she has had 7 ear surgeries. Occasional left drainage but not often. Stable hearing. She was last seen 11/2020 for cleaning of the left EAC/mastoid. Here for 3 month ear check/cleaning. States she is hearing \"chirping\" and that means she needs a cleaning. CT temporal bone 8/17:  FINDINGS:    RIGHT TEMPORAL BONE:  The external auditory canal is clear without evidence    of bony erosion.  The scutum is intact.  The middle ear cavity is clear.  The    ossicular chain is intact.  The mastoid air cells are clear.  The inner ear    structures appear unremarkable.  Normal mineralization of the otic capsule.     The internal auditory canal and vestibular aqueduct appear unremarkable.  The    carotid canal is normal in appearance.  The jugular bulb is unremarkable.         LEFT TEMPORAL BONE: The external auditory canal is clear without evidence of    bony erosion.  There is postsurgical change in the region of the scutum    without definite residual soft tissue abnormality. Lucetta Record has been resection    of the ossicular chain.  There is fluid in the left mastoid air cells.  The    inner ear structures appear unremarkable.  Normal mineralization of the otic    capsule.  The internal auditory canal and vestibular aqueduct appear    unremarkable.  The carotid canal is normal in appearance.  The jugular bulb    is unremarkable.         BRAIN: The visualized portion of the intracranial contents appear    unremarkable.         ORBITS: The visualized portion of the orbits demonstrate no acute abnormality.         SINUSES: There is polyp versus mucous retention cyst in the right maxillary    sinus.              Impression    Postsurgical change to the left temporal bone region without definite    residual soft tissue abnormality.         Mild fluid in the left mastoid air cells.         Polyp versus mucous retention cyst in the right maxillary sinus. Audio 8/13/2020:  AD moderate to severe SNHL  AS severe to profound mixed HL, type B tymp    July 20, 2021     Outpatient Medications Prior to Visit   Medication Sig Dispense Refill    silver sulfADIAZINE (SILVADENE) 1 % cream Apply topically daily. 30 g 1    meloxicam (MOBIC) 15 MG tablet Take 15 mg by mouth daily      cyclobenzaprine (FLEXERIL) 10 MG tablet Take 1 tablet by mouth 2 times daily as needed for Muscle spasms 60 tablet 5    lisinopril (PRINIVIL;ZESTRIL) 10 MG tablet take 1 tablet by mouth once daily 30 tablet 5    atorvastatin (LIPITOR) 40 MG tablet Take 1 tablet by mouth daily 30 tablet 5    vitamin D (ERGOCALCIFEROL) 1.25 MG (32498 UT) CAPS capsule Take 1 capsule by mouth once a week 16 capsule 3    albuterol sulfate  (90 Base) MCG/ACT inhaler Inhale 2 puffs into the lungs every 6 hours as needed for Wheezing 1 Inhaler 1    Glucosamine-Chondroit-Vit C-Mn (GLUCOSAMINE 1500 COMPLEX PO) Take by mouth daily      CRANBERRY PO Take by mouth daily      Multiple Vitamins-Minerals (HAIR/SKIN/NAILS PO) Take by mouth daily      ascorbic acid (VITAMIN C) 500 MG tablet Take 500 mg by mouth daily. No facility-administered medications prior to visit. Drug Sensitivities: Patient has no known allergies. Blood pressure 128/80, pulse 72, resp. rate 14, not currently breastfeeding. General: The patient is found to be alert and normally responsive female with grossly normal hearing wearing AD hearing aid, clear voice and normal articulation. Communication is without difficulty. Skin: The skin has normal colour and turgor. Face:  The facial contour is symmetric at rest and with movement. Ears: The pinnae have normal contours. AS: EAC clear  AS crusting from tm and mastoid removed w alligator (lifted off with curette) on reconstructed TM, mostly superior on the TM in a crescent, keratin/debris plug at 1-2 o'clock, keratinized, no mucosa, small amount of crusting superior anterior tm, mastoid bowl with crusting, removed with alligator   AD: EAC clear, severe retraction of pars flacida and tensa onto promontory, no effusion or cholesteatoma, looks like TM was reconstructed with cartilage graft in posterior quadrant of TM  AD: cerumen removed with alligator   Eyes: The ocular movements are full and symmetric, the conjunctiva is unremarkable; sclera are anicteric, pupillary response is symmetric. No nystagmus is found. Nose: The external nasal contour is normal  Neck: The neck has a normal contour; no masses are found on palpation. Diagnosis Orders   1. History of left mastoidectomy     2. 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 Sensorineural hearing loss (SNHL) of right ear with restricted hearing of left ear     4. Bilateral impacted cerumen         ASSESSMENT:  No evidence on CT of recurrent choleseatoma  Mixed HL AS  Right severe adhesive atelectasis      PLAN:  Offered referral for ear surgeon for possible audio rehab options but she is not interested in more surgery  Stable left ear   Continue q3mo routine cleaning   Audio 2021    Right adhesive atelectasis, stable    Continue HA AD   Monitor   No cholesteatoma        Future Appointments   Date Time Provider Johnny Ashford   2021 11:20 AM Dixon Parker [facility]   2022 10:30 AM AARTI Cha CNM  [facility]     No orders of the defined types were placed in this encounter.

## 2021-08-09 ENCOUNTER — HOSPITAL ENCOUNTER (OUTPATIENT)
Age: 61
Setting detail: OUTPATIENT SURGERY
Discharge: HOME | End: 2021-08-09
Attending: UROLOGY | Admitting: UROLOGY
Payer: MEDICAID

## 2021-08-09 ENCOUNTER — ANESTHESIA (OUTPATIENT)
Dept: OPERATING ROOM | Age: 61
End: 2021-08-09
Payer: MEDICAID

## 2021-08-09 ENCOUNTER — ANESTHESIA EVENT (OUTPATIENT)
Dept: OPERATING ROOM | Age: 61
End: 2021-08-09
Payer: MEDICAID

## 2021-08-09 VITALS
DIASTOLIC BLOOD PRESSURE: 70 MMHG | BODY MASS INDEX: 22.33 KG/M2 | OXYGEN SATURATION: 98 % | SYSTOLIC BLOOD PRESSURE: 130 MMHG | TEMPERATURE: 97.1 F | HEIGHT: 64 IN | RESPIRATION RATE: 18 BRPM | HEART RATE: 66 BPM | WEIGHT: 130.8 LBS

## 2021-08-09 VITALS — TEMPERATURE: 94.9 F | SYSTOLIC BLOOD PRESSURE: 103 MMHG | DIASTOLIC BLOOD PRESSURE: 55 MMHG | OXYGEN SATURATION: 100 %

## 2021-08-09 DIAGNOSIS — N20.0 KIDNEY STONE: Primary | ICD-10-CM

## 2021-08-09 PROCEDURE — 2500000003 HC RX 250 WO HCPCS: Performed by: NURSE ANESTHETIST, CERTIFIED REGISTERED

## 2021-08-09 PROCEDURE — 7100000000 HC PACU RECOVERY - FIRST 15 MIN: Performed by: UROLOGY

## 2021-08-09 PROCEDURE — 7100000010 HC PHASE II RECOVERY - FIRST 15 MIN: Performed by: UROLOGY

## 2021-08-09 PROCEDURE — 2709999900 HC NON-CHARGEABLE SUPPLY: Performed by: UROLOGY

## 2021-08-09 PROCEDURE — 3700000000 HC ANESTHESIA ATTENDED CARE: Performed by: UROLOGY

## 2021-08-09 PROCEDURE — 2580000003 HC RX 258: Performed by: NURSE ANESTHETIST, CERTIFIED REGISTERED

## 2021-08-09 PROCEDURE — 6360000002 HC RX W HCPCS: Performed by: UROLOGY

## 2021-08-09 PROCEDURE — 6360000002 HC RX W HCPCS: Performed by: NURSE ANESTHETIST, CERTIFIED REGISTERED

## 2021-08-09 PROCEDURE — 3600000003 HC SURGERY LEVEL 3 BASE: Performed by: UROLOGY

## 2021-08-09 PROCEDURE — 7100000011 HC PHASE II RECOVERY - ADDTL 15 MIN: Performed by: UROLOGY

## 2021-08-09 PROCEDURE — 3600000013 HC SURGERY LEVEL 3 ADDTL 15MIN: Performed by: UROLOGY

## 2021-08-09 PROCEDURE — 7100000001 HC PACU RECOVERY - ADDTL 15 MIN: Performed by: UROLOGY

## 2021-08-09 PROCEDURE — 3700000001 HC ADD 15 MINUTES (ANESTHESIA): Performed by: UROLOGY

## 2021-08-09 RX ORDER — SODIUM CHLORIDE 9 MG/ML
25 INJECTION, SOLUTION INTRAVENOUS PRN
Status: DISCONTINUED | OUTPATIENT
Start: 2021-08-09 | End: 2021-08-09 | Stop reason: HOSPADM

## 2021-08-09 RX ORDER — SODIUM CHLORIDE 0.9 % (FLUSH) 0.9 %
5-40 SYRINGE (ML) INJECTION EVERY 12 HOURS SCHEDULED
Status: DISCONTINUED | OUTPATIENT
Start: 2021-08-09 | End: 2021-08-09 | Stop reason: HOSPADM

## 2021-08-09 RX ORDER — LIDOCAINE HYDROCHLORIDE 20 MG/ML
INJECTION, SOLUTION EPIDURAL; INFILTRATION; INTRACAUDAL; PERINEURAL PRN
Status: DISCONTINUED | OUTPATIENT
Start: 2021-08-09 | End: 2021-08-09 | Stop reason: SDUPTHER

## 2021-08-09 RX ORDER — TAMSULOSIN HYDROCHLORIDE 0.4 MG/1
0.4 CAPSULE ORAL DAILY
Qty: 21 CAPSULE | Refills: 0 | Status: SHIPPED | OUTPATIENT
Start: 2021-08-09 | End: 2022-04-04

## 2021-08-09 RX ORDER — SODIUM CHLORIDE, SODIUM LACTATE, POTASSIUM CHLORIDE, CALCIUM CHLORIDE 600; 310; 30; 20 MG/100ML; MG/100ML; MG/100ML; MG/100ML
INJECTION, SOLUTION INTRAVENOUS CONTINUOUS
Status: DISCONTINUED | OUTPATIENT
Start: 2021-08-09 | End: 2021-08-09 | Stop reason: HOSPADM

## 2021-08-09 RX ORDER — SODIUM CHLORIDE, SODIUM LACTATE, POTASSIUM CHLORIDE, CALCIUM CHLORIDE 600; 310; 30; 20 MG/100ML; MG/100ML; MG/100ML; MG/100ML
INJECTION, SOLUTION INTRAVENOUS CONTINUOUS PRN
Status: DISCONTINUED | OUTPATIENT
Start: 2021-08-09 | End: 2021-08-09 | Stop reason: SDUPTHER

## 2021-08-09 RX ORDER — HYDROCODONE BITARTRATE AND ACETAMINOPHEN 5; 325 MG/1; MG/1
1 TABLET ORAL EVERY 6 HOURS PRN
Qty: 18 TABLET | Refills: 0 | Status: SHIPPED | OUTPATIENT
Start: 2021-08-09 | End: 2021-08-14

## 2021-08-09 RX ORDER — PROPOFOL 10 MG/ML
INJECTION, EMULSION INTRAVENOUS PRN
Status: DISCONTINUED | OUTPATIENT
Start: 2021-08-09 | End: 2021-08-09 | Stop reason: SDUPTHER

## 2021-08-09 RX ORDER — SODIUM CHLORIDE 0.9 % (FLUSH) 0.9 %
5-40 SYRINGE (ML) INJECTION PRN
Status: DISCONTINUED | OUTPATIENT
Start: 2021-08-09 | End: 2021-08-09 | Stop reason: HOSPADM

## 2021-08-09 RX ORDER — ONDANSETRON 2 MG/ML
INJECTION INTRAMUSCULAR; INTRAVENOUS PRN
Status: DISCONTINUED | OUTPATIENT
Start: 2021-08-09 | End: 2021-08-09 | Stop reason: SDUPTHER

## 2021-08-09 RX ORDER — DEXAMETHASONE SODIUM PHOSPHATE 10 MG/ML
INJECTION INTRAMUSCULAR; INTRAVENOUS PRN
Status: DISCONTINUED | OUTPATIENT
Start: 2021-08-09 | End: 2021-08-09 | Stop reason: SDUPTHER

## 2021-08-09 RX ORDER — EPHEDRINE SULFATE/0.9% NACL/PF 50 MG/5 ML
SYRINGE (ML) INTRAVENOUS PRN
Status: DISCONTINUED | OUTPATIENT
Start: 2021-08-09 | End: 2021-08-09 | Stop reason: SDUPTHER

## 2021-08-09 RX ORDER — KETOROLAC TROMETHAMINE 30 MG/ML
INJECTION, SOLUTION INTRAMUSCULAR; INTRAVENOUS PRN
Status: DISCONTINUED | OUTPATIENT
Start: 2021-08-09 | End: 2021-08-09 | Stop reason: SDUPTHER

## 2021-08-09 RX ADMIN — Medication 10 MG: at 17:52

## 2021-08-09 RX ADMIN — Medication 10 MG: at 17:57

## 2021-08-09 RX ADMIN — Medication 10 MG: at 17:45

## 2021-08-09 RX ADMIN — Medication 10 MG: at 17:42

## 2021-08-09 RX ADMIN — KETOROLAC TROMETHAMINE 15 MG: 30 INJECTION, SOLUTION INTRAMUSCULAR; INTRAVENOUS at 17:46

## 2021-08-09 RX ADMIN — PROPOFOL 200 MG: 10 INJECTION, EMULSION INTRAVENOUS at 17:33

## 2021-08-09 RX ADMIN — LIDOCAINE HYDROCHLORIDE 100 MG: 20 INJECTION, SOLUTION EPIDURAL; INFILTRATION; INTRACAUDAL; PERINEURAL at 17:33

## 2021-08-09 RX ADMIN — DEXAMETHASONE SODIUM PHOSPHATE 10 MG: 10 INJECTION INTRAMUSCULAR; INTRAVENOUS at 17:46

## 2021-08-09 RX ADMIN — SODIUM CHLORIDE, POTASSIUM CHLORIDE, SODIUM LACTATE AND CALCIUM CHLORIDE: 600; 310; 30; 20 INJECTION, SOLUTION INTRAVENOUS at 17:29

## 2021-08-09 RX ADMIN — ONDANSETRON 4 MG: 2 INJECTION INTRAMUSCULAR; INTRAVENOUS at 17:46

## 2021-08-09 RX ADMIN — Medication 2000 MG: at 17:37

## 2021-08-09 RX ADMIN — Medication 10 MG: at 17:48

## 2021-08-09 ASSESSMENT — PAIN DESCRIPTION - ORIENTATION
ORIENTATION: LEFT

## 2021-08-09 ASSESSMENT — PULMONARY FUNCTION TESTS
PIF_VALUE: 4
PIF_VALUE: 3
PIF_VALUE: 15
PIF_VALUE: 2
PIF_VALUE: 0
PIF_VALUE: 2
PIF_VALUE: 4
PIF_VALUE: 0
PIF_VALUE: 4
PIF_VALUE: 4
PIF_VALUE: 2
PIF_VALUE: 1
PIF_VALUE: 3
PIF_VALUE: 1
PIF_VALUE: 4
PIF_VALUE: 3
PIF_VALUE: 4
PIF_VALUE: 3
PIF_VALUE: 2
PIF_VALUE: 2
PIF_VALUE: 3
PIF_VALUE: 4
PIF_VALUE: 3
PIF_VALUE: 4
PIF_VALUE: 1
PIF_VALUE: 1
PIF_VALUE: 3
PIF_VALUE: 4
PIF_VALUE: 2
PIF_VALUE: 2
PIF_VALUE: 4
PIF_VALUE: 4
PIF_VALUE: 2
PIF_VALUE: 0
PIF_VALUE: 2
PIF_VALUE: 4
PIF_VALUE: 0
PIF_VALUE: 0
PIF_VALUE: 5
PIF_VALUE: 3
PIF_VALUE: 4
PIF_VALUE: 0
PIF_VALUE: 4
PIF_VALUE: 0
PIF_VALUE: 2
PIF_VALUE: 1
PIF_VALUE: 2
PIF_VALUE: 3
PIF_VALUE: 2
PIF_VALUE: 2
PIF_VALUE: 5

## 2021-08-09 ASSESSMENT — PAIN DESCRIPTION - DESCRIPTORS
DESCRIPTORS: BURNING;THROBBING
DESCRIPTORS: THROBBING;BURNING
DESCRIPTORS: ACHING
DESCRIPTORS: DISCOMFORT

## 2021-08-09 ASSESSMENT — PAIN SCALES - GENERAL
PAINLEVEL_OUTOF10: 5
PAINLEVEL_OUTOF10: 2
PAINLEVEL_OUTOF10: 3
PAINLEVEL_OUTOF10: 6
PAINLEVEL_OUTOF10: 0
PAINLEVEL_OUTOF10: 6
PAINLEVEL_OUTOF10: 6

## 2021-08-09 ASSESSMENT — PAIN DESCRIPTION - PAIN TYPE
TYPE: SURGICAL PAIN

## 2021-08-09 ASSESSMENT — PAIN DESCRIPTION - ONSET: ONSET: AWAKENED FROM SLEEP

## 2021-08-09 ASSESSMENT — PAIN DESCRIPTION - LOCATION
LOCATION: FLANK

## 2021-08-09 ASSESSMENT — LIFESTYLE VARIABLES: SMOKING_STATUS: 1

## 2021-08-09 ASSESSMENT — PAIN - FUNCTIONAL ASSESSMENT: PAIN_FUNCTIONAL_ASSESSMENT: 0-10

## 2021-08-09 ASSESSMENT — COPD QUESTIONNAIRES: CAT_SEVERITY: MILD

## 2021-08-09 NOTE — ANESTHESIA PRE PROCEDURE
Department of Anesthesiology  Preprocedure Note       Name:  Bri Kelley   Age:  62 y.o.  :  1960                                          MRN:  [id number]         Date:  2021      Surgeon: Rupinder Thayer):  Tim Chapin MD    Procedure: Procedure(s):  v-Left LITHOTRIPSY ([facility] #[id number] Hien Paz)    Medications prior to admission:   Prior to Admission medications    Medication Sig Start Date End Date Taking? Authorizing Provider   lisinopril (PRINIVIL;ZESTRIL) 10 MG tablet take 1 tablet by mouth once daily 3/9/21  Yes Gabbie Knowles18 Woody Carballo   atorvastatin (LIPITOR) 40 MG tablet Take 1 tablet by mouth daily 3/9/21  Yes RENATO Knowles   silver sulfADIAZINE (SILVADENE) 1 % cream Apply topically daily. 21   Ame Herring DPM   meloxicam (MOBIC) 15 MG tablet Take 15 mg by mouth daily    Historical Provider, MD   cyclobenzaprine (FLEXERIL) 10 MG tablet Take 1 tablet by mouth 2 times daily as needed for Muscle spasms 3/22/21   Alesia Reed, APRN - CNP   vitamin D (ERGOCALCIFEROL) 1.25 MG (77574 UT) CAPS capsule Take 1 capsule by mouth once a week 3/8/21   [facility], APRN - CNM   albuterol sulfate  (90 Base) MCG/ACT inhaler Inhale 2 puffs into the lungs every 6 hours as needed for Wheezing 4/3/20   Mary Bay MD   Glucosamine-Chondroit-Vit C-Mn (GLUCOSAMINE 1500 COMPLEX PO) Take by mouth daily    Historical Provider, MD   CRANBERRY PO Take by mouth daily    Historical Provider, MD   Multiple Vitamins-Minerals (HAIR/SKIN/NAILS PO) Take by mouth daily    Historical Provider, MD   ascorbic acid (VITAMIN C) 500 MG tablet Take 500 mg by mouth daily. Historical Provider, MD       Current medications:    No current facility-administered medications for this encounter.        Allergies:  No Known Allergies    Problem List:    Patient Active Problem List   Diagnosis Code    CAD (coronary artery disease) I25.10    Tobacco abuse Z72.0    Hypertension I10    Hypercholesterolemia E78.00    History of thyroiditis Z86.39    Insomnia G47.00    Hydronephrosis with obstructing calculus N13.2    Kidney stone N20.0    Vitamin D deficiency E55.9       Past Medical History:        Diagnosis Date    Abnormal Pap smear of cervix 1970    before her hysterectomy in 1972    CAD (coronary artery disease)     Chronic kidney disease     COPD (chronic obstructive pulmonary disease) (HCC)     Hemorrhoids     History of herpes zoster 2002    (right lower rib area laterally and anteriorly).  History of thyroiditis 1992    History of ulcerative colitis     (of 20 years disease - though quiescent).  Hypercholesterolemia     Hypertension     Neuritis     Shortened ID interval     (on EKG from 2004).  Tobacco abuse     Venous insufficiency, peripheral     (bilateral). Past Surgical History:        Procedure Laterality Date    APPENDECTOMY  1982    (Dr. Yeni Acosta) - Acute appendicitis.  CARDIAC CATHETERIZATION  2000    Preserved left ventricular systolic function. Ejection fraction 50%. Noncritical coronary disease. (Dr. Armando Barnett).  CHOLECYSTECTOMY  2001    COLONOSCOPY  1988    (Dr. Adrien Matos) - Normal colon.  COLONOSCOPY  5/12/16    Polyp distal sigmoid, rectum - Dr Derrek Germain Left 8/5/2019    CYSTO Left Stent Insertion performed by Rayo Zhang MD at [facility]  2010    Incision and drainage of thrombosed hemorrhoid.  HYSTERECTOMY  2001    (no BSO).  LITHOTRIPSY Left 8/19/2019    CYSTO W/ STENT REMOVAL ET Left LITHOTRIPSY performed by Rayo Zhang MD at [facility] surgery x8.    OTHER SURGICAL HISTORY  1987    Pelvic exam under anesthesia. (Dr. Rolf Beal) - Chronic pelvic pain, chronic inflammatory process changes.     TUBAL LIGATION  1982       Social History:    Social History     Tobacco Use    Smoking status: Current Every Day Smoker     Packs/day: 1.00     Years: 15.00     Pack years: 15.00     Start date: 1/1/1981    Smokeless tobacco: Never Used    Tobacco comment: Guillermo Rodriguez RRT 8/4/19   Substance Use Topics    Alcohol use: Yes     Alcohol/week: 0.0 standard drinks     Comment: occasional                                Ready to quit: Not Answered  Counseling given: Not Answered  Comment: Guillermo Rodriguez RRT 8/4/19      Vital Signs (Current):   Vitals:    08/09/21 1447   BP: 133/64   Pulse: 53   Resp: 18   Temp: 36.1 °C (97 °F)   TempSrc: Oral   SpO2: 100%   Weight: 130 lb 12.8 oz (59.3 kg)   Height: 5' 4\" (1.626 m)                                              BP Readings from Last 3 Encounters:   08/09/21 133/64   07/20/21 128/80   06/21/21 116/72       NPO Status: Time of last liquid consumption: 0000                        Time of last solid consumption: 2300                        Date of last liquid consumption: 08/09/21                        Date of last solid food consumption: 08/08/21    BMI:   Wt Readings from Last 3 Encounters:   08/09/21 130 lb 12.8 oz (59.3 kg)   06/21/21 131 lb (59.4 kg)   05/11/21 138 lb 9.6 oz (62.9 kg)     Body mass index is 22.45 kg/m². CBC:   Lab Results   Component Value Date    WBC 10.3 08/05/2019    RBC 3.53 08/05/2019    HGB 11.2 08/05/2019    HCT 33.5 08/05/2019    MCV 94.8 08/05/2019    RDW 15.1 08/05/2019     08/05/2019       CMP:   Lab Results   Component Value Date     03/08/2021    K 4.1 03/08/2021     03/08/2021    CO2 30 03/08/2021    BUN 19 03/08/2021    CREATININE 1.12 03/08/2021    GFRAA >60 03/08/2021    LABGLOM 50 03/08/2021    GLUCOSE 98 03/08/2021    PROT 7.0 03/08/2021    CALCIUM 9.8 03/08/2021    BILITOT 0.44 03/08/2021    ALKPHOS 77 03/08/2021    AST 18 03/08/2021    ALT 18 03/08/2021       POC Tests: No results for input(s): POCGLU, POCNA, POCK, POCCL, POCBUN, POCHEMO, POCHCT in the last 72 hours.     Coags: No results found for: PROTIME, INR, APTT    HCG (If Applicable): No results found for: PREGTESTUR, PREGSERUM, HCG, HCGQUANT     ABGs: No results found for: PHART, PO2ART, ZDZ5GKG, QCS9VER, BEART, F1HEXOGO     Type & Screen (If Applicable):  No results found for: LABABO, LABRH    Drug/Infectious Status (If Applicable):  Lab Results   Component Value Date    HEPCAB NONREACTIVE 04/18/2016       COVID-19 Screening (If Applicable): No results found for: COVID19        Anesthesia Evaluation  Patient summary reviewed and Nursing notes reviewed no history of anesthetic complications:   Airway: Mallampati: II  TM distance: >3 FB   Neck ROM: full  Mouth opening: > = 3 FB Dental:    (+) upper dentures and lower dentures      Pulmonary: breath sounds clear to auscultation  (+) COPD: mild,  current smoker          Patient smoked on day of surgery. Cardiovascular:  Exercise tolerance: good (>4 METS),   (+) hypertension:, hyperlipidemia        Rhythm: regular  Rate: normal           Beta Blocker:  Not on Beta Blocker         Neuro/Psych:   Negative Neuro/Psych ROS              GI/Hepatic/Renal:   (+) renal disease: kidney stones,           Endo/Other: Negative Endo/Other ROS                    Abdominal:             Vascular: negative vascular ROS. Other Findings:             Anesthesia Plan      general     ASA 2       Induction: intravenous. Anesthetic plan and risks discussed with patient.       Plan discussed with surgical team.                  AARTI Aguilar CRNA   8/9/2021

## 2021-08-09 NOTE — H&P
Cassandra Capellan MD  History and Physical    Patient:  Tricia Escalante  MRN: [id number]  YOB: 1960    HISTORY OF PRESENT ILLNESS:     The patient is a 62 y.o. female who presents with left renal stone. Here for procedure. Patient's old records, notes and chart reviewed and summarized above. Cassandra Capellan MD independently reviewed the images and verified the radiology reports from:    No results found. Past Medical History:    Past Medical History:   Diagnosis Date    Abnormal Pap smear of cervix 1970    before her hysterectomy in 1972    CAD (coronary artery disease)     Chronic kidney disease     COPD (chronic obstructive pulmonary disease) ([facility] [address]     Hemorrhoids     History of herpes zoster 2002    (right lower rib area laterally and anteriorly).  History of thyroiditis 1992    History of ulcerative colitis     (of 20 years disease - though quiescent).  Hypercholesterolemia     Hypertension     Neuritis     Shortened KS interval     (on EKG from 2004).  Tobacco abuse     Venous insufficiency, peripheral     (bilateral). Past Surgical History:    Past Surgical History:   Procedure Laterality Date    APPENDECTOMY  1982    (Dr. Hilario Larkin) - Acute appendicitis.  CARDIAC CATHETERIZATION  2000    Preserved left ventricular systolic function. Ejection fraction 50%. Noncritical coronary disease. (Dr. Lawanda Nieto).  CHOLECYSTECTOMY  2001    COLONOSCOPY  1988    (Dr. Aysha Pompa) - Normal colon.  COLONOSCOPY  5/12/16    Polyp distal sigmoid, rectum - Dr Aida García Left 8/5/2019    CYSTO Left Stent Insertion performed by Saint Bran, MD at [facility]  2010    Incision and drainage of thrombosed hemorrhoid.  HYSTERECTOMY  2001    (no BSO).     LITHOTRIPSY Left 8/19/2019    CYSTO W/ STENT REMOVAL ET Left LITHOTRIPSY performed by Saint Bran, MD at [address] [facility] surgery x8.   [id number] [facility] Pelvic exam under anesthesia. (Dr. Kacie Nevarez) - Chronic pelvic pain, chronic inflammatory process changes.  TUBAL LIGATION  1982       Medications Prior to Admission:    Prior to Admission medications    Medication Sig Start Date End Date Taking? Authorizing Provider   lisinopril (PRINIVIL;ZESTRIL) 10 MG tablet take 1 tablet by mouth once daily 3/9/21  Yes Sheryle Merlin, Yoon Carballo   atorvastatin (LIPITOR) 40 MG tablet Take 1 tablet by mouth daily 3/9/21  Yes Sheryle Merlin, PA   silver sulfADIAZINE (SILVADENE) 1 % cream Apply topically daily. 4/28/21   Bozena Villatoro DPM   meloxicam (MOBIC) 15 MG tablet Take 15 mg by mouth daily    Historical Provider, MD   cyclobenzaprine (FLEXERIL) 10 MG tablet Take 1 tablet by mouth 2 times daily as needed for Muscle spasms 3/22/21   AARTI Whatley - CNP   vitamin D (ERGOCALCIFEROL) 1.25 MG (08375 UT) CAPS capsule Take 1 capsule by mouth once a week 3/8/21   AARTI Buenrostro - CNM   albuterol sulfate  (90 Base) MCG/ACT inhaler Inhale 2 puffs into the lungs every 6 hours as needed for Wheezing 4/3/20   Ivone Roche MD   Glucosamine-Chondroit-Vit C-Mn (GLUCOSAMINE 1500 COMPLEX PO) Take by mouth daily    Historical Provider, MD   CRANBERRY PO Take by mouth daily    Historical Provider, MD   Multiple Vitamins-Minerals (HAIR/SKIN/NAILS PO) Take by mouth daily    Historical Provider, MD   ascorbic acid (VITAMIN C) 500 MG tablet Take 500 mg by mouth daily. Historical Provider, MD       Allergies:  Patient has no known allergies.     Social History:    Social History     Socioeconomic History    Marital status:      Spouse name: Not on file    Number of children: Not on file    Years of education: Not on file    Highest education level: Not on file   Occupational History    Not on file   Tobacco Use    Smoking status: Current Every Day Smoker     Packs/day: 1.00     Years: 15.00     Pack years: 15.00     Start date: 1/1/1981    Smokeless tobacco: Never Used    Tobacco comment: Chad Morris RRT 8/4/19   Vaping Use    Vaping Use: Never used   Substance and Sexual Activity    Alcohol use: Yes     Alcohol/week: 0.0 standard drinks     Comment: occasional    Drug use: No    Sexual activity: Yes     Partners: Male   Other Topics Concern    Not on file   Social History Narrative    Not on file     Social Determinants of Health     Financial Resource Strain:     Difficulty of Paying Living Expenses:    Food Insecurity:     Worried About Running Out of Food in the Last Year:     [address] in the Last Year:    Transportation Needs:     Lack of Transportation (Medical):  Lack of Transportation (Non-Medical):    Physical Activity:     Days of Exercise per Week:     Minutes of Exercise per Session:    Stress:     Feeling of Stress :    Social Connections:     Frequency of Communication with Friends and Family:     Frequency of Social Gatherings with Friends and Family:     Attends Christianity Services:     Active Member of Clubs or Organizations:     Attends Club or Organization Meetings:     Marital Status:    Intimate Partner Violence:     Fear of Current or Ex-Partner:     Emotionally Abused:     Physically Abused:     Sexually Abused:        Family History:    Family History   Problem Relation Age of Onset    Diabetes Mother     Other Mother         (history of polyps).     Arthritis Mother     Cancer Mother         breast    Breast Cancer Mother 79    COPD Father     Other Father         ('s disease)    Mental Illness Daughter         bipolar; schizophrenia       REVIEW OF SYSTEMS:  Constitutional: negative  Eyes: negative  Respiratory: negative  Cardiovascular: negative  Gastrointestinal: negative  Genitourinary: no acute issues  Musculoskeletal: negative  Skin: negative   Neurological: negative  Hematological/Lymphatic: negative  Psychological: negative    Physical Exam:      Patient Vitals for the past 24 hrs:   BP Temp Temp src Pulse Resp SpO2 Height Weight   08/09/21 1447 133/64 97 °F (36.1 °C) Oral 53 18 100 % 5' 4\" (1.626 m) 130 lb 12.8 oz (59.3 kg)     Constitutional: Patient in no acute distress; Neuro: alert and oriented to person place and time. Psych: Mood and affect normal.  Skin: Normal  Lungs: Respiratory effort normal, CTA  Cardiovascular:  Normal peripheral pulses; no murmur. Normal rhythm  Abdomen: Soft, non-tender, non-distended with no CVA, flank pain, hepatosplenomegaly or hernia. Kidneys normal.  Bladder non-tender and not distended. LABS:   No results for input(s): WBC, HGB, HCT, MCV, PLT in the last 72 hours. No results for input(s): NA, K, CL, CO2, PHOS, BUN, CREATININE in the last 72 hours. Invalid input(s): CA  No results found for: PSA      Urinalysis: No results for input(s): COLORU, PHUR, LABCAST, WBCUA, RBCUA, MUCUS, TRICHOMONAS, YEAST, BACTERIA, CLARITYU, SPECGRAV, LEUKOCYTESUR, UROBILINOGEN, Juan Manuel [place] in the last 72 hours.     Invalid input(s): NITRATE, GLUCOSEUKETONESUAMORPHOUS     -----------------------------------------------------------------      Assessment and Plan     Impression:    Patient Active Problem List   Diagnosis    CAD (coronary artery disease)    Tobacco abuse    Hypertension    Hypercholesterolemia    History of thyroiditis    Insomnia    Hydronephrosis with obstructing calculus    Kidney stone    Vitamin D deficiency       Plan:     Consent obtained; left ESWL in OR today    Francheska Li MD  2:53 PM 8/9/2021

## 2021-08-09 NOTE — ANESTHESIA POSTPROCEDURE EVALUATION
Department of Anesthesiology  Postprocedure Note    Patient: Corrine Stokes  MRN: [id number]  YOB: 1960  Date of evaluation: 8/9/2021  Time:  6:28 PM     Procedure Summary     Date: 08/09/21 Room / Location: [address] [place] [address]    Anesthesia Start: 1729 Anesthesia Stop: Precilla Gonzalez    Procedure: Left LITHOTRIPSY (Left ) Diagnosis: (left kidney stone)    Surgeons: John Motley MD Responsible Provider: AARTI Macias CRNA    Anesthesia Type: general ASA Status: 2          Anesthesia Type: general    Marino Phase I: Marino Score: 10    Marino Phase II:      Last vitals: Reviewed and per EMR flowsheets.        Anesthesia Post Evaluation    Patient location during evaluation: PACU  Patient participation: complete - patient participated  Level of consciousness: awake and alert  Pain score: 0  Airway patency: patent  Nausea & Vomiting: no nausea and no vomiting  Complications: no  Cardiovascular status: blood pressure returned to baseline and hemodynamically stable  Respiratory status: acceptable  Hydration status: euvolemic

## 2021-08-10 NOTE — OP NOTE
Patient:  Eliz Lyn  MRN: [id number]  YOB: 1960    FACILITY: [place], [place]    DATE: 8/9/2021    SURGEON: Charbel Garcia MD     ASSISTANT: none    PREOPERATIVE DIAGNOSIS: left kidney stone    POSTOPERATIVE DIAGNOSIS: left kidney stone    PROCEDURE PERFORMED:     1. Left Extracorporeal Shock Wave Lithotripsy    ANESTHESIA: General    ESTIMATED BLOOD LOSS: 0ml    COMPLICATIONS: None immediate    DRAINS: none    SPECIMENS: none    INDICATIONS FOR PROCEDURE:  The patient is a 62 y.o. female who presents today with left kidney stone here for Left LITHOTRIPSY. After risks, benefits and alternatives of the procedure were discussed with the patient, the patient elected to proceed. DESCRIPTION OF PROCEDURE:  The patient was placed in the supine position and underwent anesthesia induction. Preoperative antibiotics were given. A proper time out was performed. Using fluoroscopy the stone was visualized in the midpole as noted previously on KUB. The stone was then treated with 3000 shocks with power levels 4 to 8. There was a 2 minute pause after 200 shocks to try and decrease the postoperative bleeding. The patient's stone seemed to break up throughout the case and the patient did well with no complications. CONDITION:  Good.   Follow up in 4-6 months with kub prior

## 2021-09-09 ENCOUNTER — OFFICE VISIT (OUTPATIENT)
Dept: FAMILY MEDICINE CLINIC | Age: 61
End: 2021-09-09
Payer: MEDICAID

## 2021-09-09 VITALS
DIASTOLIC BLOOD PRESSURE: 60 MMHG | HEIGHT: 64 IN | SYSTOLIC BLOOD PRESSURE: 110 MMHG | HEART RATE: 64 BPM | BODY MASS INDEX: 22.71 KG/M2 | WEIGHT: 133 LBS

## 2021-09-09 DIAGNOSIS — E78.00 HYPERCHOLESTEROLEMIA: ICD-10-CM

## 2021-09-09 DIAGNOSIS — M48.061 NARROWING OF LUMBAR SPINE: ICD-10-CM

## 2021-09-09 DIAGNOSIS — I10 ESSENTIAL HYPERTENSION: Primary | ICD-10-CM

## 2021-09-09 PROCEDURE — 99213 OFFICE O/P EST LOW 20 MIN: CPT | Performed by: PHYSICIAN ASSISTANT

## 2021-09-09 PROCEDURE — 99212 OFFICE O/P EST SF 10 MIN: CPT

## 2021-09-09 PROCEDURE — 4004F PT TOBACCO SCREEN RCVD TLK: CPT | Performed by: PHYSICIAN ASSISTANT

## 2021-09-09 PROCEDURE — G8427 DOCREV CUR MEDS BY ELIG CLIN: HCPCS | Performed by: PHYSICIAN ASSISTANT

## 2021-09-09 PROCEDURE — 3017F COLORECTAL CA SCREEN DOC REV: CPT | Performed by: PHYSICIAN ASSISTANT

## 2021-09-09 PROCEDURE — G8420 CALC BMI NORM PARAMETERS: HCPCS | Performed by: PHYSICIAN ASSISTANT

## 2021-09-09 RX ORDER — IBUPROFEN 800 MG/1
800 TABLET ORAL 3 TIMES DAILY PRN
Qty: 90 TABLET | Refills: 5 | Status: SHIPPED | OUTPATIENT
Start: 2021-09-09

## 2021-09-09 RX ORDER — ATORVASTATIN CALCIUM 40 MG/1
40 TABLET, FILM COATED ORAL DAILY
Qty: 30 TABLET | Refills: 5 | Status: SHIPPED | OUTPATIENT
Start: 2021-09-09 | End: 2022-03-09 | Stop reason: SDUPTHER

## 2021-09-09 RX ORDER — DICLOFENAC SODIUM 75 MG/1
TABLET, DELAYED RELEASE ORAL
COMMUNITY
Start: 2021-07-08 | End: 2021-09-09 | Stop reason: ALTCHOICE

## 2021-09-09 RX ORDER — LISINOPRIL 10 MG/1
TABLET ORAL
Qty: 30 TABLET | Refills: 5 | Status: SHIPPED | OUTPATIENT
Start: 2021-09-09 | End: 2022-03-09 | Stop reason: SDUPTHER

## 2021-09-09 SDOH — ECONOMIC STABILITY: FOOD INSECURITY: WITHIN THE PAST 12 MONTHS, THE FOOD YOU BOUGHT JUST DIDN'T LAST AND YOU DIDN'T HAVE MONEY TO GET MORE.: NEVER TRUE

## 2021-09-09 SDOH — ECONOMIC STABILITY: FOOD INSECURITY: WITHIN THE PAST 12 MONTHS, YOU WORRIED THAT YOUR FOOD WOULD RUN OUT BEFORE YOU GOT MONEY TO BUY MORE.: NEVER TRUE

## 2021-09-09 ASSESSMENT — SOCIAL DETERMINANTS OF HEALTH (SDOH): HOW HARD IS IT FOR YOU TO PAY FOR THE VERY BASICS LIKE FOOD, HOUSING, MEDICAL CARE, AND HEATING?: NOT HARD AT ALL

## 2021-09-09 ASSESSMENT — ENCOUNTER SYMPTOMS
BOWEL INCONTINENCE: 0
BACK PAIN: 1
RESPIRATORY NEGATIVE: 1

## 2021-09-09 NOTE — PROGRESS NOTES
Subjective:      Patient ID: Christie Whitt is a 62 y.o. female. Hypertension  This is a chronic problem. The current episode started more than 1 year ago. The problem is controlled. Pertinent negatives include no anxiety or malaise/fatigue. Past treatments include ACE inhibitors. The current treatment provides significant improvement. There are no compliance problems. Hyperlipidemia  This is a chronic problem. The current episode started more than 1 year ago. The problem is controlled. She has no history of obesity. Associated symptoms include leg pain. Current antihyperlipidemic treatment includes statins. The current treatment provides significant improvement of lipids. There are no compliance problems. Back Pain  This is a chronic problem. The current episode started more than 1 year ago. The problem has been waxing and waning since onset. The pain is present in the lumbar spine. Associated symptoms include leg pain. Pertinent negatives include no bladder incontinence, bowel incontinence, paresis or paresthesias. She has tried NSAIDs for the symptoms. The treatment provided moderate relief. Review of Systems   Constitutional: Negative. Negative for malaise/fatigue. HENT: Negative. Respiratory: Negative. Cardiovascular: Negative. Gastrointestinal: Negative for bowel incontinence. Genitourinary: Negative for bladder incontinence. Musculoskeletal: Positive for back pain. Neurological: Negative for paresthesias. Past Medical History:   Diagnosis Date    Abnormal Pap smear of cervix 1970    before her hysterectomy in 1972    CAD (coronary artery disease)     Chronic kidney disease     COPD (ch[facility] obstructive pulmonary disease) ([facility] [address]     Hemorrhoids     History of herpes zoster 2002    (right lower rib area laterally and anteriorly).  History of thyroiditis 1992    History of ulcerative colitis     (of 20 years disease - though quiescent).     Hypercholesterolemia  Hypertension     Neuritis     Shortened CO interval     (on EKG from 2004).  Tobacco abuse     Venous insufficiency, peripheral     (bilateral). Past Surgical History:   Procedure Laterality Date    APPENDECTOMY  1982    (Dr. Zohaib Rouse) - Acute appendicitis.  CARDIAC CATHETERIZATION  2000    Preserved left ventricular systolic function. Ejection fraction 50%. Noncritical coronary disease. (Dr. Carlota Linton).  CHOLECYSTECTOMY  2001    COLONOSCOPY  1988    (Dr. Barb Vasquez) - Normal colon.  COLONOSCOPY  5/12/16    Polyp distal sigmoid, rectum - Dr Aleman Breath Left 8/5/2019    CYSTO Left Stent Insertion performed by Colin Quevedo MD at [place] [address]  2010    Incision and drainage of thrombosed hemorrhoid.  HYSTERECTOMY  2001    (no BSO).  LITHOTRIPSY Left 8/19/2019    CYSTO W/ STENT REMOVAL ET Left LITHOTRIPSY performed by Colin Quevedo MD at [address] [facility] LITHOTRIPSY Left 8/9/2021    Left LITHOTRIPSY performed by Colin Quevedo MD at [facility] surgery x8.    OTHER SURGICAL HISTORY  1987    Pelvic exam under anesthesia. (Dr. Mike Cordon) - Chronic pelvic pain, chronic inflammatory process changes.  TUBAL LIGATION  1982     Social History     Tobacco Use    Smoking status: Current Every Day Smoker     Packs/day: 1.00     Years: 15.00     Pack years: 15.00     Start date: 1/1/1981    Smokeless tobacco: Never Used    Tobacco comment: Allison Trevino [facility] 8/4/19   Vaping Use    Vaping Use: Never used   Substance Use Topics    Alcohol use: Yes     Alcohol/week: 0.0 standard drinks     Comment: occasional    Drug use: No       Objective:   Physical Exam  HENT:      Head: Normocephalic. Right Ear: Tympanic membrane normal.      Left Ear: Tympanic membrane normal.   Cardiovascular:      Rate and Rhythm: Normal rate. Pulses: Normal pulses. Heart sounds: Normal heart sounds.    Pulmonary:      Effort: Pulmonary effort is normal.   Abdominal: General: Abdomen is flat. Musculoskeletal:      Lumbar back: Spasms and tenderness present. Skin:     General: Skin is warm and dry. Neurological:      General: No focal deficit present. Mental Status: She is alert and oriented to person, place, and time. Psychiatric:         Mood and Affect: Mood normal.         Assessment:      1. Essential hypertension    2. Hypercholesterolemia    3. Narrowing of lumbar spine          Plan:      Update laboratory studies per previous orders. Chronic medical conditions stable on present therapy. Discontinue diclofenac secondary to lack of response. Ibuprofen 800 mg tid with food. Heat/ice. ROM exercise. Encouraged both influenza and pneumococcal vaccinations for patient. Follow-up in six months/sooner PRN.         RENATO Quinones

## 2021-09-28 ENCOUNTER — TELEPHONE (OUTPATIENT)
Dept: FAMILY MEDICINE CLINIC | Age: 61
End: 2021-09-28

## 2021-10-04 ENCOUNTER — HOSPITAL ENCOUNTER (OUTPATIENT)
Age: 61
Setting detail: SPECIMEN
Discharge: HOME | End: 2021-10-04
Payer: MEDICAID

## 2021-10-04 ENCOUNTER — OFFICE VISIT (OUTPATIENT)
Dept: UROLOGY | Age: 61
End: 2021-10-04
Payer: MEDICAID

## 2021-10-04 ENCOUNTER — HOSPITAL ENCOUNTER (OUTPATIENT)
Dept: LAB | Age: 61
Discharge: HOME | End: 2021-10-04
Payer: MEDICAID

## 2021-10-04 ENCOUNTER — HOSPITAL ENCOUNTER (OUTPATIENT)
Dept: GENERAL RADIOLOGY | Age: 61
Discharge: HOME | End: 2021-10-06
Payer: MEDICAID

## 2021-10-04 VITALS
DIASTOLIC BLOOD PRESSURE: 64 MMHG | SYSTOLIC BLOOD PRESSURE: 118 MMHG | BODY MASS INDEX: 22.71 KG/M2 | HEIGHT: 64 IN | OXYGEN SATURATION: 98 % | HEART RATE: 65 BPM | WEIGHT: 133 LBS

## 2021-10-04 DIAGNOSIS — E78.00 HYPERCHOLESTEROLEMIA: ICD-10-CM

## 2021-10-04 DIAGNOSIS — I10 ESSENTIAL HYPERTENSION: ICD-10-CM

## 2021-10-04 DIAGNOSIS — N39.0 RECURRENT UTI: ICD-10-CM

## 2021-10-04 DIAGNOSIS — Z87.442 HISTORY OF KIDNEY STONES: Primary | ICD-10-CM

## 2021-10-04 DIAGNOSIS — Z87.442 HISTORY OF KIDNEY STONES: ICD-10-CM

## 2021-10-04 DIAGNOSIS — E55.9 VITAMIN D DEFICIENCY: ICD-10-CM

## 2021-10-04 DIAGNOSIS — N20.0 KIDNEY STONES: Primary | ICD-10-CM

## 2021-10-04 LAB
ALBUMIN SERPL-MCNC: 4.7 G/DL (ref 3.5–5.2)
ALBUMIN/GLOBULIN RATIO: 1.9 (ref 1–2.5)
ALP BLD-CCNC: 67 U/L (ref 35–104)
ALT SERPL-CCNC: 14 U/L (ref 5–33)
ANION GAP SERPL CALCULATED.3IONS-SCNC: 12 MMOL/L (ref 9–17)
AST SERPL-CCNC: 16 U/L
BILIRUB SERPL-MCNC: 0.32 MG/DL (ref 0.3–1.2)
BUN BLDV-MCNC: 19 MG/DL (ref 6–20)
BUN/CREAT BLD: 19 (ref 9–20)
CALCIUM SERPL-MCNC: 10 MG/DL (ref 8.6–10.4)
CHLORIDE BLD-SCNC: 105 MMOL/L (ref 98–107)
CHOLESTEROL/HDL RATIO: 3.1
CHOLESTEROL: 173 MG/DL
CO2: 26 MMOL/L (ref 20–31)
CREAT SERPL-MCNC: 0.99 MG/DL (ref 0.5–0.9)
GFR AFRICAN AMERICAN: >60 ML/MIN
GFR NON-AFRICAN AMERICAN: 57 ML/MIN
GFR SERPL CREATININE-BSD FRML MDRD: ABNORMAL ML/MIN/{1.73_M2}
GFR SERPL CREATININE-BSD FRML MDRD: ABNORMAL ML/MIN/{1.73_M2}
GLUCOSE BLD-MCNC: 91 MG/DL (ref 70–99)
HDLC SERPL-MCNC: 56 MG/DL
LDL CHOLESTEROL: 99 MG/DL (ref 0–130)
POTASSIUM SERPL-SCNC: 4.5 MMOL/L (ref 3.7–5.3)
SODIUM BLD-SCNC: 143 MMOL/L (ref 135–144)
TOTAL PROTEIN: 7.2 G/DL (ref 6.4–8.3)
TRIGL SERPL-MCNC: 92 MG/DL
VITAMIN D 25-HYDROXY: 30.1 NG/ML (ref 30–100)
VLDLC SERPL CALC-MCNC: NORMAL MG/DL (ref 1–30)

## 2021-10-04 PROCEDURE — 82306 VITAMIN D 25 HYDROXY: CPT

## 2021-10-04 PROCEDURE — 80061 LIPID PANEL: CPT

## 2021-10-04 PROCEDURE — 87186 SC STD MICRODIL/AGAR DIL: CPT

## 2021-10-04 PROCEDURE — 99213 OFFICE O/P EST LOW 20 MIN: CPT | Performed by: UROLOGY

## 2021-10-04 PROCEDURE — 99024 POSTOP FOLLOW-UP VISIT: CPT | Performed by: UROLOGY

## 2021-10-04 PROCEDURE — 87086 URINE CULTURE/COLONY COUNT: CPT

## 2021-10-04 PROCEDURE — 80053 COMPREHEN METABOLIC PANEL: CPT

## 2021-10-04 PROCEDURE — 74018 RADEX ABDOMEN 1 VIEW: CPT

## 2021-10-04 PROCEDURE — 87077 CULTURE AEROBIC IDENTIFY: CPT

## 2021-10-04 PROCEDURE — 36415 COLL VENOUS BLD VENIPUNCTURE: CPT

## 2021-10-04 NOTE — PROGRESS NOTES
S/p L eswl  Unchanged stone, maybe slightly smaller in appearance- resistant to ESWL  Will follow this stone. kub in six months.   Having left flank pain- will get urine culture today

## 2021-10-06 DIAGNOSIS — N39.0 RECURRENT UTI: Primary | ICD-10-CM

## 2021-10-06 LAB
CULTURE: ABNORMAL
Lab: ABNORMAL
SPECIMEN DESCRIPTION: ABNORMAL

## 2021-10-06 RX ORDER — CEPHALEXIN 500 MG/1
500 CAPSULE ORAL 3 TIMES DAILY
Qty: 21 CAPSULE | Refills: 0 | Status: SHIPPED | OUTPATIENT
Start: 2021-10-06 | End: 2021-10-13

## 2021-10-06 NOTE — PROGRESS NOTES
Per Dr. Chas Denis order: Keflex 500mg PO TID x7 days sent to [facility]. Attempted to leave message- VM is full.

## 2021-10-28 ENCOUNTER — OFFICE VISIT (OUTPATIENT)
Dept: OTOLARYNGOLOGY | Age: 61
End: 2021-10-28
Payer: MEDICAID

## 2021-10-28 VITALS
DIASTOLIC BLOOD PRESSURE: 82 MMHG | BODY MASS INDEX: 22.83 KG/M2 | SYSTOLIC BLOOD PRESSURE: 128 MMHG | WEIGHT: 133 LBS | RESPIRATION RATE: 14 BRPM | HEART RATE: 72 BPM

## 2021-10-28 DIAGNOSIS — H90.A32 MIXED CONDUCTIVE AND SENSORINEURAL HEARING LOSS OF LEFT EAR WITH RESTRICTED HEARING OF RIGHT EAR: Primary | ICD-10-CM

## 2021-10-28 DIAGNOSIS — Z90.89 HISTORY OF LEFT MASTOIDECTOMY: ICD-10-CM

## 2021-10-28 PROCEDURE — 69220 CLEAN OUT MASTOID CAVITY: CPT | Performed by: OTOLARYNGOLOGY

## 2021-10-28 PROCEDURE — G8420 CALC BMI NORM PARAMETERS: HCPCS | Performed by: OTOLARYNGOLOGY

## 2021-10-28 PROCEDURE — 99214 OFFICE O/P EST MOD 30 MIN: CPT | Performed by: OTOLARYNGOLOGY

## 2021-10-28 PROCEDURE — 3017F COLORECTAL CA SCREEN DOC REV: CPT | Performed by: OTOLARYNGOLOGY

## 2021-10-28 PROCEDURE — G8484 FLU IMMUNIZE NO ADMIN: HCPCS | Performed by: OTOLARYNGOLOGY

## 2021-10-28 PROCEDURE — G8427 DOCREV CUR MEDS BY ELIG CLIN: HCPCS | Performed by: OTOLARYNGOLOGY

## 2021-10-28 PROCEDURE — 4004F PT TOBACCO SCREEN RCVD TLK: CPT | Performed by: OTOLARYNGOLOGY

## 2021-10-28 PROCEDURE — 99213 OFFICE O/P EST LOW 20 MIN: CPT | Performed by: OTOLARYNGOLOGY

## 2021-10-28 RX ORDER — CIPROFLOXACIN AND DEXAMETHASONE 3; 1 MG/ML; MG/ML
4 SUSPENSION/ DROPS AURICULAR (OTIC) 2 TIMES DAILY
Qty: 1 EACH | Refills: 0 | Status: SHIPPED | OUTPATIENT
Start: 2021-10-28 | End: 2021-11-04

## 2021-10-28 NOTE — PROGRESS NOTES
HPI:     Fu for AS retraction pocket, has had mult AU surg including AD PORP/TORP, has been on cortisporin gtts in the past     Wears right HA, left HA doesn't work well due to muffled noises on the left with the HA. Last ear surgery was approx 5 years ago (2015) and she thinks it was in [place]. Has also seen otologist in [facility]. [facility]. States she has had 7 ear surgeries. Occasional left drainage but not often. Stable hearing. Gets routine cleaning of the left EAC/mastoid. States she is hearing \"chirping\" and that means she needs a cleaning. She believes that Dr. Harini Patel has done one of her ear surgeries. She has been having worsening left otorrhea and pain x 1 week. CT temporal bone 8/17:  FINDINGS:    RIGHT TEMPORAL BONE:  The external auditory canal is clear without evidence    of bony erosion.  The scutum is intact.  The middle ear cavity is clear.  The    ossicular chain is intact.  The mastoid air cells are clear.  The inner ear    structures appear unremarkable.  Normal mineralization of the otic capsule.     The internal auditory canal and vestibular aqueduct appear unremarkable.  The    carotid canal is normal in appearance.  The jugular bulb is unremarkable.         LEFT TEMPORAL BONE: The external auditory canal is clear without evidence of    bony erosion.  There is postsurgical change in the region of the scutum    without definite residual soft tissue abnormality. Omelia Sovereign has been resection    of the ossicular chain.  There is fluid in the left mastoid air cells.  The    inner ear structures appear unremarkable.  Normal mineralization of the otic    capsule.  The internal auditory canal and vestibular aqueduct appear    unremarkable.  The carotid canal is normal in appearance.  The jugular bulb    is unremarkable.         BRAIN: The visualized portion of the intracranial contents appear    unremarkable.         ORBITS: The visualized portion of the orbits demonstrate no acute abnormality.      SINUSES: There is polyp versus mucous retention cyst in the right maxillary    sinus.              Impression    Postsurgical change to the left temporal bone region without definite    residual soft tissue abnormality.         Mild fluid in the left mastoid air cells.         Polyp versus mucous retention cyst in the right maxillary sinus. Audio 8/13/2020:  AD moderate to severe SNHL  AS severe to profound mixed HL, type B tymp    October 28, 2021     Outpatient Medications Prior to Visit   Medication Sig Dispense Refill    lisinopril (PRINIVIL;ZESTRIL) 10 MG tablet take 1 tablet by mouth once daily 30 tablet 5    atorvastatin (LIPITOR) 40 MG tablet Take 1 tablet by mouth daily 30 tablet 5    ibuprofen (ADVIL;MOTRIN) 800 MG tablet Take 1 tablet by mouth 3 times daily as needed for Pain 90 tablet 5    tamsulosin (FLOMAX) 0.4 MG capsule Take 1 capsule by mouth daily Take one capsule daily to facilitate passage of ureteral stone 21 capsule 0    silver sulfADIAZINE (SILVADENE) 1 % cream Apply topically daily. 30 g 1    meloxicam (MOBIC) 15 MG tablet Take 15 mg by mouth daily       cyclobenzaprine (FLEXERIL) 10 MG tablet Take 1 tablet by mouth 2 times daily as needed for Muscle spasms 60 tablet 5    vitamin D (ERGOCALCIFEROL) 1.25 MG (90053 UT) CAPS capsule Take 1 capsule by mouth once a week 16 capsule 3    albuterol sulfate  (90 Base) MCG/ACT inhaler Inhale 2 puffs into the lungs every 6 hours as needed for Wheezing 1 Inhaler 1    Glucosamine-Chondroit-Vit C-Mn (GLUCOSAMINE 1500 COMPLEX PO) Take by mouth daily      CRANBERRY PO Take by mouth daily      Multiple Vitamins-Minerals (HAIR/SKIN/NAILS PO) Take by mouth daily      ascorbic acid (VITAMIN C) 500 MG tablet Take 500 mg by mouth daily        No facility-administered medications prior to visit. Drug Sensitivities: Patient has no known allergies. Blood pressure 128/82, pulse 72, resp.  rate 14, weight 133 lb (60.3 kg), not currently breastfeeding. General: The patient is found to be alert and normally responsive female with grossly normal hearing wearing AD hearing aid, clear voice and normal articulation. Communication is without difficulty. Skin: The skin has normal colour and turgor. Face: The facial contour is symmetric at rest and with movement. Ears: The pinnae have normal contours. AS: EAC clear  AS crusting from tm and mastoid removed w alligator (lifted off with curette) on reconstructed TM, mostly superior on the TM in a crescent, keratin/debris plug at 1-2 o'clock, keratinized, no mucosa, small amount of crusting superior anterior tm, mastoid bowl with crusting, removed with alligator, severe accumulation of waxy white keratin in superior aspect of tm, keratin is tucked under superior lip   AD: EAC clear, severe retraction of pars flacida and tensa onto promontory, no effusion or cholesteatoma, looks like TM was reconstructed with cartilage graft in posterior quadrant of TM  Eyes: The ocular movements are full and symmetric, the conjunctiva is unremarkable; sclera are anicteric, pupillary response is symmetric. No nystagmus is found. Nose: The external nasal contour is normal  Neck: The neck has a normal contour; no masses are found on palpation. Diagnosis Orders   1. Mixed conductive and sensorineural hearing loss of left ear with restricted hearing of right ear  External Referral To ENT   2.  History of left mastoidectomy  External Referral To ENT       ASSESSMENT:  No evidence on CT of recurrent choleseatoma  Mixed HL AS  Right severe adhesive atelectasis      PLAN:  Offered referral for ear surgeon and she is interested  Discussed that worsening ear drainage may indicate progression of cholesteatoma  Continue q3mo routine cleaning   Due for audio 8/2021  ciprodex otic gtt to the AS ear     Right adhesive atelectasis, stable    Continue HA AD   Monitor   No cholesteatoma        Future Appointments   Date Time Provider Johnny Ashford   2022 10:30 AM [facility]   3/9/2022 10:40 AM Amanda Mckeon, [address] [facility]   2022  9:20 AM Kalpesh Crawford MD [facility]

## 2021-11-08 ENCOUNTER — TELEPHONE (OUTPATIENT)
Dept: OTOLARYNGOLOGY | Age: 61
End: 2021-11-08

## 2021-11-08 NOTE — TELEPHONE ENCOUNTER
Patient states that she is waiting for a week to discuss the surgery she is to get and has not hear anything.  Please call  her back

## 2021-11-09 NOTE — TELEPHONE ENCOUNTER
I spoke to Kimo Francis today regarding finding her an otologist due to her inner ear symptoms. Since she has [facility] she has to stay within [facility] and  having a hard time located an otologist within the boundry of her [employer] [place]. I ask that she call pauline for suggestions because at times if there is not one located within a certain mile range other insurances have waved the outnetwork rule and let people go to the specialist that they are familiar with.   She will call me back after she speeks to a representitive

## 2021-11-12 NOTE — TELEPHONE ENCOUNTER
Received call from patient wanting to speak with Shea Castro or [facility] in ENT. I explained they are out of the office today and will return on Monday. She states she wanted to inform them to go ahead and schedule her surgery for her left ear at [facility]. V[facility] in [facility] or [address] [place] [address] in [facility] [address]. I explained I will send a message to Shea Castro and [facility] regarding her surgery and they should contact her next week. She states she will also call back Monday to touch base with either of them. She can be reached at [phone].

## 2021-11-15 ENCOUNTER — TELEPHONE (OUTPATIENT)
Dept: OTOLARYNGOLOGY | Age: 61
End: 2021-11-15

## 2021-11-15 NOTE — TELEPHONE ENCOUNTER
Patient called to speak with Aleta Phalen, LPN. She states she spoke with her insurance and has some information regarding surgery. Please call patient back at [phone].

## 2021-11-19 NOTE — TELEPHONE ENCOUNTER
Explained to Marjorie Leary it would not be possible to schedule at [facility] or [facility] [place] [address] as they do not have an otologists that goes to those facilities. She has had 6 previous ear surgeries and we may need to find an otologist that specializes in inner ear surgeries. She has seen Dr. Tim Haley in the past and hopefully Pinky Fletcher would let her see him again since there are no others within a 60 mile radius.  The closest  otoligist might be [place] which is much farther than 60 miles

## 2021-11-19 NOTE — TELEPHONE ENCOUNTER
Spoke to Mani Grayson regarding her Marlea Joseph coverage she said we needed to get prior auth from [employer] and referral to send to Marianne Bass. Milagro not heard of this before I think she means we need referrral from Dr Pearl Thacker to Dr. [employer] and prior Tyler Aponte to see Dr. [employer] as he is the only one within a 60 mile radius and maybe they would authorize her to see him as he is not a Marianne Blades provider he does not work for [id number].

## 2022-01-10 ENCOUNTER — OFFICE VISIT (OUTPATIENT)
Dept: OTOLARYNGOLOGY | Age: 62
End: 2022-01-10
Payer: MEDICAID

## 2022-01-10 VITALS
SYSTOLIC BLOOD PRESSURE: 118 MMHG | RESPIRATION RATE: 18 BRPM | WEIGHT: 133.8 LBS | HEIGHT: 64 IN | HEART RATE: 67 BPM | DIASTOLIC BLOOD PRESSURE: 70 MMHG | OXYGEN SATURATION: 98 % | BODY MASS INDEX: 22.84 KG/M2

## 2022-01-10 DIAGNOSIS — Z90.89 HISTORY OF LEFT MASTOIDECTOMY: ICD-10-CM

## 2022-01-10 DIAGNOSIS — H90.A21 SENSORINEURAL HEARING LOSS (SNHL) OF RIGHT EAR WITH RESTRICTED HEARING OF LEFT EAR: ICD-10-CM

## 2022-01-10 DIAGNOSIS — H90.A32 MIXED CONDUCTIVE AND SENSORINEURAL HEARING LOSS OF LEFT EAR WITH RESTRICTED HEARING OF RIGHT EAR: Primary | ICD-10-CM

## 2022-01-10 PROCEDURE — 99213 OFFICE O/P EST LOW 20 MIN: CPT | Performed by: OTOLARYNGOLOGY

## 2022-01-10 PROCEDURE — 4004F PT TOBACCO SCREEN RCVD TLK: CPT | Performed by: OTOLARYNGOLOGY

## 2022-01-10 PROCEDURE — 99214 OFFICE O/P EST MOD 30 MIN: CPT | Performed by: OTOLARYNGOLOGY

## 2022-01-10 PROCEDURE — 3017F COLORECTAL CA SCREEN DOC REV: CPT | Performed by: OTOLARYNGOLOGY

## 2022-01-10 PROCEDURE — G8484 FLU IMMUNIZE NO ADMIN: HCPCS | Performed by: OTOLARYNGOLOGY

## 2022-01-10 PROCEDURE — G8427 DOCREV CUR MEDS BY ELIG CLIN: HCPCS | Performed by: OTOLARYNGOLOGY

## 2022-01-10 PROCEDURE — G8420 CALC BMI NORM PARAMETERS: HCPCS | Performed by: OTOLARYNGOLOGY

## 2022-01-10 PROCEDURE — 69220 CLEAN OUT MASTOID CAVITY: CPT | Performed by: OTOLARYNGOLOGY

## 2022-01-10 RX ORDER — CIPROFLOXACIN AND DEXAMETHASONE 3; 1 MG/ML; MG/ML
4 SUSPENSION/ DROPS AURICULAR (OTIC) 2 TIMES DAILY
Qty: 1 EACH | Refills: 1 | Status: SHIPPED | OUTPATIENT
Start: 2022-01-10 | End: 2022-01-17

## 2022-01-10 NOTE — PROGRESS NOTES
unremarkable.         ORBITS: The visualized portion of the orbits demonstrate no acute abnormality.         SINUSES: There is polyp versus mucous retention cyst in the right maxillary    sinus.              Impression    Postsurgical change to the left temporal bone region without definite    residual soft tissue abnormality.         Mild fluid in the left mastoid air cells.         Polyp versus mucous retention cyst in the right maxillary sinus. Audio 8/13/2020:  AD moderate to severe SNHL  AS severe to profound mixed HL, type B tymp    January 10, 2022     Outpatient Medications Prior to Visit   Medication Sig Dispense Refill    lisinopril (PRINIVIL;ZESTRIL) 10 MG tablet take 1 tablet by mouth once daily 30 tablet 5    atorvastatin (LIPITOR) 40 MG tablet Take 1 tablet by mouth daily 30 tablet 5    ibuprofen (ADVIL;MOTRIN) 800 MG tablet Take 1 tablet by mouth 3 times daily as needed for Pain 90 tablet 5    cyclobenzaprine (FLEXERIL) 10 MG tablet Take 1 tablet by mouth 2 times daily as needed for Muscle spasms 60 tablet 5    vitamin D (ERGOCALCIFEROL) 1.25 MG (69326 UT) CAPS capsule Take 1 capsule by mouth once a week 16 capsule 3    albuterol sulfate  (90 Base) MCG/ACT inhaler Inhale 2 puffs into the lungs every 6 hours as needed for Wheezing 1 Inhaler 1    Glucosamine-Chondroit-Vit C-Mn (GLUCOSAMINE 1500 COMPLEX PO) Take by mouth daily      CRANBERRY PO Take by mouth daily      Multiple Vitamins-Minerals (HAIR/SKIN/NAILS PO) Take by mouth daily      ascorbic acid (VITAMIN C) 500 MG tablet Take 500 mg by mouth daily       tamsulosin (FLOMAX) 0.4 MG capsule Take 1 capsule by mouth daily Take one capsule daily to facilitate passage of ureteral stone (Patient not taking: Reported on 1/10/2022) 21 capsule 0    silver sulfADIAZINE (SILVADENE) 1 % cream Apply topically daily.  (Patient not taking: Reported on 1/10/2022) 30 g 1    meloxicam (MOBIC) 15 MG tablet Take 15 mg by mouth daily (Patient not taking: Reported on 1/10/2022)       No facility-administered medications prior to visit. Drug Sensitivities: Patient has no known allergies. Blood pressure 118/70, pulse 67, resp. rate 18, height 5' 4\" (1.626 m), weight 133 lb 12.8 oz (60.7 kg), SpO2 98 %, not currently breastfeeding. General: The patient is found to be alert and normally responsive female with grossly normal hearing wearing AD hearing aid, clear voice and normal articulation. Communication is without difficulty. Skin: The skin has normal colour and turgor. Face: The facial contour is symmetric at rest and with movement. Ears: The pinnae have normal contours. AS: EAC clear  AS crusting from tm and mastoid removed w alligator (lifted off with curette) on reconstructed TM, mostly superior on the TM in a crescent, keratin/debris plug at 1-2 o'clock, keratinized, no mucosa, small amount of crusting superior anterior tm, mastoid bowl with crusting, removed with alligator, severe accumulation of waxy white keratin in superior aspect of tm, keratin is tucked under superior lip   AD: EAC clear, severe retraction of pars flacida and tensa onto promontory, no effusion or cholesteatoma, looks like TM was reconstructed with cartilage graft in posterior quadrant of TM  Eyes: The ocular movements are full and symmetric, the conjunctiva is unremarkable; sclera are anicteric, pupillary response is symmetric. No nystagmus is found. Nose: The external nasal contour is normal  Neck: The neck has a normal contour; no masses are found on palpation. Diagnosis Orders   1. Mixed conductive and sensorineural hearing loss of left ear with restricted hearing of right ear  External Referral To ENT   2. History of left mastoidectomy  External Referral To ENT   3.  Sensorineural hearing loss (SNHL) of right ear with restricted hearing of left ear         ASSESSMENT:  No evidence on CT of recurrent choleseatoma  Mixed HL AS  Right severe adhesive atelectasis      PLAN:  Offered referral to otology for evaluation and she is interested  Has [facility], losing insurance in 2022  Discussed that worsening ear drainage may indicate progression of cholesteatoma  Continue q3mo routine cleaning   Due for repeat audio 2021  Refill ciprodex otic gtt to the AS ear     Right adhesive atelectasis, stable    Continue HA AD   Monitor   No cholesteatoma     Future Appointments   Date Time Provider Johnny Ashford   2022 10:30 AM AARTI Whiting CNM  [facility]   3/9/2022 10:40 AM RENATO Espinosa [facility]   2022  9:20 AM MD KIMBERLY Solis [facility]

## 2022-01-11 ENCOUNTER — TELEPHONE (OUTPATIENT)
Dept: OTOLARYNGOLOGY | Age: 62
End: 2022-01-11

## 2022-01-11 NOTE — TELEPHONE ENCOUNTER
Patient called the office asking to speak with Sada Carolina. Patient has some questions regarding her upcoming surgery with Dr Rito Hermosillo.  Call back ph #[phone]

## 2022-01-12 NOTE — TELEPHONE ENCOUNTER
I spoke to Yisel Downs she states she is having audiogram at [facility] for her appt with Dr. Lori Marina. I did call the hearing aid center to be sure it would be with the audiologist and also complete with bone conduction and tympanogram and audiogram .  They will fax these results to me and I will then fax them along with her office notes and prior audiograms to Dr. Virginia Emerson office.

## 2022-02-02 ENCOUNTER — OFFICE VISIT (OUTPATIENT)
Dept: OBGYN | Age: 62
End: 2022-02-02
Payer: MEDICAID

## 2022-02-02 ENCOUNTER — HOSPITAL ENCOUNTER (OUTPATIENT)
Dept: LAB | Age: 62
Discharge: HOME | End: 2022-02-02
Payer: MEDICAID

## 2022-02-02 ENCOUNTER — HOSPITAL ENCOUNTER (OUTPATIENT)
Age: 62
Setting detail: SPECIMEN
Discharge: HOME | End: 2022-02-02
Payer: MEDICAID

## 2022-02-02 VITALS
HEIGHT: 64 IN | BODY MASS INDEX: 23.49 KG/M2 | WEIGHT: 137.6 LBS | HEART RATE: 60 BPM | OXYGEN SATURATION: 94 % | SYSTOLIC BLOOD PRESSURE: 130 MMHG | DIASTOLIC BLOOD PRESSURE: 70 MMHG

## 2022-02-02 DIAGNOSIS — Z01.419 WOMEN'S ANNUAL ROUTINE GYNECOLOGICAL EXAMINATION: Primary | ICD-10-CM

## 2022-02-02 DIAGNOSIS — N95.1 MENOPAUSAL STATE: ICD-10-CM

## 2022-02-02 DIAGNOSIS — E55.9 VITAMIN D DEFICIENCY: ICD-10-CM

## 2022-02-02 DIAGNOSIS — R30.0 DYSURIA: ICD-10-CM

## 2022-02-02 DIAGNOSIS — Z12.31 ENCOUNTER FOR SCREENING MAMMOGRAM FOR MALIGNANT NEOPLASM OF BREAST: ICD-10-CM

## 2022-02-02 DIAGNOSIS — N34.2 INFECTIVE URETHRITIS: ICD-10-CM

## 2022-02-02 LAB
-: ABNORMAL
AMORPHOUS: ABNORMAL
BACTERIA: ABNORMAL
BILIRUBIN URINE: NEGATIVE
CASTS UA: ABNORMAL /LPF (ref 0–2)
COLOR: ABNORMAL
COMMENT UA: ABNORMAL
CRYSTALS, UA: ABNORMAL /HPF
EPITHELIAL CELLS UA: ABNORMAL /HPF (ref 0–5)
GLUCOSE URINE: NEGATIVE
KETONES, URINE: NEGATIVE
LEUKOCYTE ESTERASE, URINE: NEGATIVE
MUCUS: ABNORMAL
NITRITE, URINE: NEGATIVE
OTHER OBSERVATIONS UA: ABNORMAL
PH UA: 6.5 (ref 5–6)
PROTEIN UA: NEGATIVE
RBC UA: ABNORMAL /HPF (ref 0–4)
RENAL EPITHELIAL, UA: ABNORMAL /HPF
SPECIFIC GRAVITY UA: 1.01 (ref 1.01–1.02)
TRICHOMONAS: ABNORMAL
TURBIDITY: ABNORMAL
URINE HGB: ABNORMAL
UROBILINOGEN, URINE: NORMAL
VITAMIN D 25-HYDROXY: 25.1 NG/ML (ref 30–100)
WBC UA: ABNORMAL /HPF (ref 0–4)
YEAST: ABNORMAL

## 2022-02-02 PROCEDURE — 87088 URINE BACTERIA CULTURE: CPT

## 2022-02-02 PROCEDURE — 99396 PREV VISIT EST AGE 40-64: CPT

## 2022-02-02 PROCEDURE — 87086 URINE CULTURE/COLONY COUNT: CPT

## 2022-02-02 PROCEDURE — 36415 COLL VENOUS BLD VENIPUNCTURE: CPT

## 2022-02-02 PROCEDURE — 99396 PREV VISIT EST AGE 40-64: CPT | Performed by: ADVANCED PRACTICE MIDWIFE

## 2022-02-02 PROCEDURE — 81001 URINALYSIS AUTO W/SCOPE: CPT

## 2022-02-02 PROCEDURE — 82306 VITAMIN D 25 HYDROXY: CPT

## 2022-02-02 PROCEDURE — G8484 FLU IMMUNIZE NO ADMIN: HCPCS | Performed by: ADVANCED PRACTICE MIDWIFE

## 2022-02-02 PROCEDURE — 87186 SC STD MICRODIL/AGAR DIL: CPT

## 2022-02-02 PROCEDURE — 87077 CULTURE AEROBIC IDENTIFY: CPT

## 2022-02-02 RX ORDER — CEFADROXIL 500 MG/1
500 CAPSULE ORAL 2 TIMES DAILY
Qty: 14 CAPSULE | Refills: 0 | Status: SHIPPED | OUTPATIENT
Start: 2022-02-02 | End: 2022-02-09

## 2022-02-02 ASSESSMENT — ENCOUNTER SYMPTOMS
GASTROINTESTINAL NEGATIVE: 1
RESPIRATORY NEGATIVE: 1
EYES NEGATIVE: 1

## 2022-02-02 NOTE — PROGRESS NOTES
Subjective:      Patient ID: Annalee Martines  is a 62 y.o. y.o. female. Tyron Pimentel presents today for annual examination. She reports concern for possible UTI, burning upon urination and frequency. She is  and sexually active and denies pain or bleeding with intercourse. She smokes tobacco.       Review of Systems   Constitutional: Negative. HENT: Negative. Eyes: Negative. Respiratory: Negative. Cardiovascular: Negative. Gastrointestinal: Negative. Genitourinary: Positive for dysuria. Musculoskeletal: Negative. Skin: Negative. Neurological: Negative. Psychiatric/Behavioral: Negative. Breast ROS: negative    Objective:   /70   Pulse 60   Ht 5' 4\" (1.626 m)   Wt 137 lb 9.6 oz (62.4 kg)   LMP  (LMP Unknown)   SpO2 94%   BMI 23.62 kg/m²   Physical Exam  Constitutional:       Appearance: She is well-developed and normal weight. HENT:      Head: Normocephalic and atraumatic. Eyes:      Conjunctiva/sclera: Conjunctivae normal.   Cardiovascular:      Rate and Rhythm: Normal rate and regular rhythm. Heart sounds: Normal heart sounds. Pulmonary:      Effort: Pulmonary effort is normal.      Breath sounds: Normal breath sounds. Chest:   Breasts: Breasts are symmetrical.      Right: No inverted nipple, mass, nipple discharge, skin change or tenderness. Left: No inverted nipple, mass, nipple discharge, skin change or tenderness. Abdominal:      Palpations: Abdomen is soft. Genitourinary:     General: Normal vulva. Vagina: Normal.      Comments: External genitalia WNL, no lesions noted. Vaginal canal is pale pink with few rugae present and normal appearing nonodorous discharge. Cervix, uterus and one adnexa are surgically absent. Other adnexa nonpalpable. Musculoskeletal:         General: Normal range of motion. Cervical back: Normal range of motion and neck supple. Skin:     General: Skin is warm and dry.    Neurological:      Mental Status: She is alert and oriented to person, place, and time. Deep Tendon Reflexes: Reflexes are normal and symmetric. Psychiatric:         Mood and Affect: Mood normal.         Thought Content: Thought content normal.         Sexually active: Yes  Any bleeding or pain withintercourse: No  Last Yearly:  1/2021  Last Pap 1/26/2021 Negative DELBERT  Last Mammogram: 1/2021 BiRads 1  Last Dexascan No previous  Do you do self breast exams: Yes    Assessment:      Diagnosis Orders   1. Women's annual routine gynecological examination     2. Dysuria     3. Vitamin D deficiency     4. Encounter for screening mammogram for malignant neoplasm of breast             Plan:    Education: discussed diet with calcium intake 2658-8642 mg./day with weight bearing exercise 30-50 minutes 3-5x/week. SBE monthly, and screening mammogram yearly. Will recheck vitamin D level and send urine for UTI. Will send prescription in for UTI as appropriate.

## 2022-02-04 LAB
CULTURE: ABNORMAL
CULTURE: ABNORMAL
Lab: ABNORMAL
SPECIMEN DESCRIPTION: ABNORMAL

## 2022-02-06 ENCOUNTER — HOSPITAL ENCOUNTER (EMERGENCY)
Age: 62
Discharge: HOME | End: 2022-02-06
Attending: EMERGENCY MEDICINE
Payer: MEDICAID

## 2022-02-06 VITALS
RESPIRATION RATE: 16 BRPM | WEIGHT: 130 LBS | DIASTOLIC BLOOD PRESSURE: 79 MMHG | SYSTOLIC BLOOD PRESSURE: 102 MMHG | HEART RATE: 75 BPM | TEMPERATURE: 99.6 F | HEIGHT: 64 IN | BODY MASS INDEX: 22.2 KG/M2 | OXYGEN SATURATION: 97 %

## 2022-02-06 DIAGNOSIS — N30.00 ACUTE CYSTITIS WITHOUT HEMATURIA: ICD-10-CM

## 2022-02-06 DIAGNOSIS — R11.2 NAUSEA AND VOMITING, INTRACTABILITY OF VOMITING NOT SPECIFIED, UNSPECIFIED VOMITING TYPE: Primary | ICD-10-CM

## 2022-02-06 LAB
-: ABNORMAL
ABSOLUTE EOS #: 0 K/UL (ref 0–0.4)
ABSOLUTE IMMATURE GRANULOCYTE: 0 K/UL (ref 0–0.3)
ABSOLUTE LYMPH #: 0 K/UL (ref 1–4.8)
ABSOLUTE MONO #: 0.12 K/UL (ref 0.1–1.2)
ALBUMIN SERPL-MCNC: 4.6 G/DL (ref 3.5–5.2)
ALBUMIN/GLOBULIN RATIO: 1.5 (ref 1–2.5)
ALP BLD-CCNC: 74 U/L (ref 35–104)
ALT SERPL-CCNC: 10 U/L (ref 5–33)
AMORPHOUS: ABNORMAL
AMYLASE: 30 U/L (ref 28–100)
ANION GAP SERPL CALCULATED.3IONS-SCNC: 14 MMOL/L (ref 9–17)
AST SERPL-CCNC: 16 U/L
BACTERIA: ABNORMAL
BASOPHILS # BLD: 0 % (ref 0–1)
BASOPHILS ABSOLUTE: 0 K/UL (ref 0–0.2)
BILIRUB SERPL-MCNC: 0.49 MG/DL (ref 0.3–1.2)
BILIRUBIN URINE: NEGATIVE
BUN BLDV-MCNC: 22 MG/DL (ref 8–23)
BUN/CREAT BLD: 25 (ref 9–20)
CALCIUM SERPL-MCNC: 9.6 MG/DL (ref 8.6–10.4)
CASTS UA: ABNORMAL /LPF (ref 0–2)
CHLORIDE BLD-SCNC: 96 MMOL/L (ref 98–107)
CO2: 25 MMOL/L (ref 20–31)
COLOR: ABNORMAL
COMMENT UA: ABNORMAL
CREAT SERPL-MCNC: 0.89 MG/DL (ref 0.5–0.9)
CRYSTALS, UA: ABNORMAL /HPF
DIFFERENTIAL TYPE: ABNORMAL
EOSINOPHILS RELATIVE PERCENT: 0 % (ref 1–7)
EPITHELIAL CELLS UA: ABNORMAL /HPF (ref 0–5)
GFR AFRICAN AMERICAN: >60 ML/MIN
GFR NON-AFRICAN AMERICAN: >60 ML/MIN
GFR SERPL CREATININE-BSD FRML MDRD: ABNORMAL ML/MIN/{1.73_M2}
GFR SERPL CREATININE-BSD FRML MDRD: ABNORMAL ML/MIN/{1.73_M2}
GLUCOSE BLD-MCNC: 151 MG/DL (ref 70–99)
GLUCOSE URINE: NEGATIVE
HCT VFR BLD CALC: 47.9 % (ref 36.3–47.1)
HEMOGLOBIN: 15.9 G/DL (ref 11.9–15.1)
IMMATURE GRANULOCYTES: 0 %
KETONES, URINE: ABNORMAL
LACTIC ACID: 1.6 MMOL/L (ref 0.5–2.2)
LEUKOCYTE ESTERASE, URINE: NEGATIVE
LIPASE: 18 U/L (ref 13–60)
LYMPHOCYTES # BLD: 0 % (ref 16–46)
MCH RBC QN AUTO: 30.7 PG (ref 25.2–33.5)
MCHC RBC AUTO-ENTMCNC: 33.2 G/DL (ref 25.2–33.5)
MCV RBC AUTO: 92.5 FL (ref 82.6–102.9)
MONOCYTES # BLD: 1 % (ref 4–11)
MORPHOLOGY: ABNORMAL
MUCUS: ABNORMAL
NITRITE, URINE: POSITIVE
NRBC AUTOMATED: 0 PER 100 WBC
OTHER OBSERVATIONS UA: ABNORMAL
PDW BLD-RTO: 12.9 % (ref 11.8–14.4)
PH UA: 5.5 (ref 5–6)
PLATELET # BLD: 233 K/UL (ref 138–453)
PLATELET ESTIMATE: ABNORMAL
PMV BLD AUTO: 9.9 FL (ref 8.1–13.5)
POTASSIUM SERPL-SCNC: 4.3 MMOL/L (ref 3.7–5.3)
PROTEIN UA: NEGATIVE
RBC # BLD: 5.18 M/UL (ref 3.95–5.11)
RBC # BLD: ABNORMAL 10*6/UL
RBC UA: ABNORMAL /HPF (ref 0–4)
RENAL EPITHELIAL, UA: ABNORMAL /HPF
SEG NEUTROPHILS: 99 % (ref 43–77)
SEGMENTED NEUTROPHILS ABSOLUTE COUNT: 11.58 K/UL (ref 1.5–8.1)
SODIUM BLD-SCNC: 135 MMOL/L (ref 135–144)
SPECIFIC GRAVITY UA: 1.03 (ref 1.01–1.02)
TOTAL PROTEIN: 7.6 G/DL (ref 6.4–8.3)
TRICHOMONAS: ABNORMAL
TROPONIN INTERP: NORMAL
TROPONIN T: NORMAL NG/ML
TROPONIN, HIGH SENSITIVITY: <6 NG/L (ref 0–14)
TURBIDITY: ABNORMAL
URINE HGB: ABNORMAL
UROBILINOGEN, URINE: NORMAL
WBC # BLD: 11.7 K/UL (ref 3.5–11.3)
WBC # BLD: ABNORMAL 10*3/UL
WBC UA: ABNORMAL /HPF (ref 0–4)
YEAST: ABNORMAL

## 2022-02-06 PROCEDURE — 6360000002 HC RX W HCPCS: Performed by: EMERGENCY MEDICINE

## 2022-02-06 PROCEDURE — 82150 ASSAY OF AMYLASE: CPT

## 2022-02-06 PROCEDURE — 83605 ASSAY OF LACTIC ACID: CPT

## 2022-02-06 PROCEDURE — 81001 URINALYSIS AUTO W/SCOPE: CPT

## 2022-02-06 PROCEDURE — 96365 THER/PROPH/DIAG IV INF INIT: CPT

## 2022-02-06 PROCEDURE — 80053 COMPREHEN METABOLIC PANEL: CPT

## 2022-02-06 PROCEDURE — 99284 EMERGENCY DEPT VISIT MOD MDM: CPT

## 2022-02-06 PROCEDURE — 85025 COMPLETE CBC W/AUTO DIFF WBC: CPT

## 2022-02-06 PROCEDURE — 83690 ASSAY OF LIPASE: CPT

## 2022-02-06 PROCEDURE — 84484 ASSAY OF TROPONIN QUANT: CPT

## 2022-02-06 PROCEDURE — 2580000003 HC RX 258: Performed by: EMERGENCY MEDICINE

## 2022-02-06 PROCEDURE — 93005 ELECTROCARDIOGRAM TRACING: CPT | Performed by: EMERGENCY MEDICINE

## 2022-02-06 PROCEDURE — 96375 TX/PRO/DX INJ NEW DRUG ADDON: CPT

## 2022-02-06 RX ORDER — ONDANSETRON 2 MG/ML
4 INJECTION INTRAMUSCULAR; INTRAVENOUS ONCE
Status: COMPLETED | OUTPATIENT
Start: 2022-02-06 | End: 2022-02-06

## 2022-02-06 RX ORDER — ONDANSETRON 4 MG/1
4 TABLET, ORALLY DISINTEGRATING ORAL EVERY 8 HOURS PRN
Qty: 20 TABLET | Refills: 0 | Status: SHIPPED | OUTPATIENT
Start: 2022-02-06

## 2022-02-06 RX ORDER — KETOROLAC TROMETHAMINE 30 MG/ML
30 INJECTION, SOLUTION INTRAMUSCULAR; INTRAVENOUS ONCE
Status: COMPLETED | OUTPATIENT
Start: 2022-02-06 | End: 2022-02-06

## 2022-02-06 RX ORDER — 0.9 % SODIUM CHLORIDE 0.9 %
1000 INTRAVENOUS SOLUTION INTRAVENOUS ONCE
Status: COMPLETED | OUTPATIENT
Start: 2022-02-06 | End: 2022-02-06

## 2022-02-06 RX ADMIN — KETOROLAC TROMETHAMINE 30 MG: 30 INJECTION, SOLUTION INTRAMUSCULAR at 09:52

## 2022-02-06 RX ADMIN — CEFTRIAXONE 1000 MG: 1 INJECTION, POWDER, FOR SOLUTION INTRAMUSCULAR; INTRAVENOUS at 11:23

## 2022-02-06 RX ADMIN — ONDANSETRON 4 MG: 2 INJECTION INTRAMUSCULAR; INTRAVENOUS at 09:52

## 2022-02-06 RX ADMIN — SODIUM CHLORIDE 1000 ML: 9 INJECTION, SOLUTION INTRAVENOUS at 09:53

## 2022-02-06 ASSESSMENT — ENCOUNTER SYMPTOMS
CONSTIPATION: 0
VOMITING: 1
COUGH: 0
EYE PAIN: 0
BLOOD IN STOOL: 0
DIARRHEA: 0
SHORTNESS OF BREATH: 0
ABDOMINAL PAIN: 1
BACK PAIN: 0
NAUSEA: 1

## 2022-02-06 ASSESSMENT — PAIN DESCRIPTION - LOCATION
LOCATION: HEAD
LOCATION: HEAD

## 2022-02-06 ASSESSMENT — PAIN DESCRIPTION - PAIN TYPE
TYPE: ACUTE PAIN
TYPE: ACUTE PAIN

## 2022-02-06 ASSESSMENT — PAIN SCALES - GENERAL
PAINLEVEL_OUTOF10: 6
PAINLEVEL_OUTOF10: 6
PAINLEVEL_OUTOF10: 8
PAINLEVEL_OUTOF10: 8

## 2022-02-06 ASSESSMENT — PAIN DESCRIPTION - DESCRIPTORS: DESCRIPTORS: PRESSURE

## 2022-02-06 NOTE — ED PROVIDER NOTES
[facility]  eMERGENCY dEPARTMENT eNCOUnter      Pt Name: Lamarr Hodgkins  MRN: [id number]  [place] 1960  Date of evaluation: 2/6/2022      CHIEF COMPLAINT       Chief Complaint   Patient presents with    Emesis         HISTORY OF PRESENT ILLNESS    Lamarr Hodgkins is a 62 y.o. female who presents   Complaints of nausea vomiting began yesterday associate with a headache and some muscle aches and leg cramps little bit of abdominal pain with vomiting has been no diarrhea she said some chills no definite fever there is been no cough or shortness of breath no chest pain nothing seems to make it better or worse patient had a hysterectomy and a cholecystectomy in the past    REVIEW OF SYSTEMS         Review of Systems   Constitutional: Positive for appetite change and chills. Negative for fever. HENT: Negative for congestion and ear pain. Eyes: Negative for pain and visual disturbance. Respiratory: Negative for cough and shortness of breath. Cardiovascular: Negative for chest pain, palpitations and leg swelling. Gastrointestinal: Positive for abdominal pain, nausea and vomiting. Negative for blood in stool, constipation and diarrhea. Endocrine: Negative for polydipsia and polyuria. Genitourinary: Negative for difficulty urinating, dysuria, frequency, vaginal bleeding and vaginal discharge. Musculoskeletal: Positive for myalgias. Negative for back pain, joint swelling, neck pain and neck stiffness. Skin: Negative for rash. Neurological: Positive for headaches. Negative for dizziness and weakness. Hematological: Negative for adenopathy. Does not bruise/bleed easily. Psychiatric/Behavioral: Negative for confusion, self-injury and suicidal ideas.          PAST MEDICAL HISTORY    has a past medical history of Abnormal Pap smear of cervix, CAD (coronary artery disease), Chronic kidney disease, COPD (chronic obstructive pulmonary disease) ([facility] [address] Hemorrhoids, History of herpes zoster, History of thyroiditis, History of ulcerative colitis, Hypercholesterolemia, Hypertension, Neuritis, Shortened [place] interval, Tobacco abuse, and Venous insufficiency, peripheral.    SURGICAL HISTORY      has a past surgical history that includes Cardiac catheterization (2000); Tubal ligation (1982); Appendectomy (1982); Cholecystectomy (2001); Hemorrhoid surgery (2010); other surgical history; other surgical history (1987); Hysterectomy (2001); Colonoscopy (1988); Colonoscopy (5/12/16); Cystoscopy (Left, 8/5/2019); Lithotripsy (Left, 8/19/2019); and Lithotripsy (Left, 8/9/2021). CURRENT MEDICATIONS       Previous Medications    ALBUTEROL SULFATE  (90 BASE) MCG/ACT INHALER    Inhale 2 puffs into the lungs every 6 hours as needed for Wheezing    ASCORBIC ACID (VITAMIN C) 500 MG TABLET    Take 500 mg by mouth daily     ATORVASTATIN (LIPITOR) 40 MG TABLET    Take 1 tablet by mouth daily    CEFADROXIL (DURICEF) 500 MG CAPSULE    Take 1 capsule by mouth 2 times daily for 7 days    CRANBERRY PO    Take by mouth daily    CYCLOBENZAPRINE (FLEXERIL) 10 MG TABLET    Take 1 tablet by mouth 2 times daily as needed for Muscle spasms    GLUCOSAMINE-CHONDROIT-VIT C-MN (GLUCOSAMINE 1500 COMPLEX PO)    Take by mouth daily    IBUPROFEN (ADVIL;MOTRIN) 800 MG TABLET    Take 1 tablet by mouth 3 times daily as needed for Pain    LISINOPRIL (PRINIVIL;ZESTRIL) 10 MG TABLET    take 1 tablet by mouth once daily    MELOXICAM (MOBIC) 15 MG TABLET    Take 15 mg by mouth daily     MULTIPLE VITAMINS-MINERALS (HAIR/SKIN/NAILS PO)    Take by mouth daily    SILVER SULFADIAZINE (SILVADENE) 1 % CREAM    Apply topically daily. TAMSULOSIN (FLOMAX) 0.4 MG CAPSULE    Take 1 capsule by mouth daily Take one capsule daily to facilitate passage of ureteral stone    VITAMIN D (ERGOCALCIFEROL) 1.25 MG (12658 UT) CAPS CAPSULE    Take 1 capsule by mouth once a week       ALLERGIES     has No Known Allergies.     FAMILY HISTORY     She indicated that her mother is alive. She indicated that her father is . She indicated that her sister is alive. She indicated that only one of her two brothers is alive. She indicated that her daughter is alive. family history includes Arthritis in her mother; Breast Cancer (age of onset: 79) in her mother; COPD in her father; Cancer in her mother; Diabetes in her mother; Mental Illness in her daughter; Other in her father and mother. SOCIAL HISTORY      reports that she has been smoking. She started smoking about 41 years ago. She has a 15.00 pack-year smoking history. She has never used smokeless tobacco. She reports current alcohol use. She reports that she does not use drugs. PHYSICAL EXAM     INITIAL VITALS:  height is 5' 4\" (1.626 m) and weight is 130 lb (59 kg). Her temperature is 99.6 °F (37.6 °C). Her blood pressure is 102/79 and her pulse is 75. Her respiration is 16 and oxygen saturation is 97%. Physical Exam  Constitutional:       General: She is not in acute distress. Appearance: Normal appearance. She is well-developed. She is not ill-appearing or diaphoretic. HENT:      Head: Normocephalic and atraumatic. Right Ear: External ear normal.      Left Ear: External ear normal.   Eyes:      Conjunctiva/sclera: Conjunctivae normal.      Pupils: Pupils are equal, round, and reactive to light. Cardiovascular:      Rate and Rhythm: Regular rhythm. Tachycardia present. Pulmonary:      Effort: Pulmonary effort is normal.      Breath sounds: Normal breath sounds. Abdominal:      General: Bowel sounds are normal. There is no distension. Palpations: Abdomen is soft. Tenderness: There is no abdominal tenderness. Musculoskeletal:         General: No tenderness. Cervical back: Normal range of motion. Skin:     General: Skin is warm and dry. Neurological:      Mental Status: She is alert and oriented to person, place, and time.    Psychiatric:         Behavior: Behavior normal.           DIFFERENTIAL DIAGNOSIS/ MDM:     Nausea vomiting benign abdominal exam we will do a work-up new of her records show the urine cultures and to to grew positive for E. coli and Enterococcus species both sensitive to cephalosporins    DIAGNOSTIC RESULTS     EKG: All EKG's are interpreted by the Emergency Department Physician who either signs or Co-signs this chart in the absence of a cardiologist.  Sinus rhythm rate of 87 bpm, SC interval is 112 ms QRS durations 84 ms QT corrected 433 ms axis is 81 there is no acute ST or T wave changes she does have enlarged P waves consistent with atrial enlargement      RADIOLOGY:   I directly visualized the following  images and reviewed the radiologist interpretations:          ED BEDSIDE ULTRASOUND:       LABS:  Labs Reviewed   COMPREHENSIVE METABOLIC PANEL - Abnormal; Notable for the following components:       Result Value    Glucose 151 (*)     Bun/Cre Ratio 25 (*)     Chloride 96 (*)     All other components within normal limits   CBC WITH AUTO DIFFERENTIAL - Abnormal; Notable for the following components:    WBC 11.7 (*)     RBC 5.18 (*)     Hemoglobin 15.9 (*)     Hematocrit 47.9 (*)     Monocytes 1 (*)     Lymphocytes 0 (*)     Seg Neutrophils 99 (*)     Eosinophils % 0 (*)     Absolute Lymph # 0.00 (*)     Segs Absolute 11.58 (*)     All other components within normal limits   URINE RT REFLEX TO CULTURE - Abnormal; Notable for the following components:    Ketones, Urine TRACE (*)     Specific Gravity, UA 1.030 (*)     Urine Hgb TRACE (*)     Nitrite, Urine POSITIVE (*)     All other components within normal limits   MICROSCOPIC URINALYSIS - Abnormal; Notable for the following components:    Bacteria, UA 4+ (*)     All other components within normal limits   AMYLASE   LIPASE   LACTIC ACID   TROPONIN       Evidence of a persistent urinary tract infection    EMERGENCY DEPARTMENT COURSE:   Vitals:    Vitals:    02/06/22 0932 02/06/22 1117   BP: (!) 160/104 102/79   Pulse: 125 75   Resp: 18 16   Temp: 99.6 °F (37.6 °C)    SpO2: 96% 97%   Weight: 130 lb (59 kg)    Height: 5' 4\" (1.626 m)      -------------------------  BP: 102/79, Temp: 99.6 °F (37.6 °C), Pulse: 75, Resp: 16        Re-evaluation Notes    Reevaluation patient is feeling much better she is nontoxic abdomen is benign, with positive cultures we will give her a gram of Rocephin to start her she has her prescription for Duricef but she only took 1 pill and stopped at home    CRITICAL CARE:   None        CONSULTS:      PROCEDURES:  None    FINAL IMPRESSION      1. Nausea and vomiting, intractability of vomiting not specified, unspecified vomiting type    2. Acute cystitis without hematuria          DISPOSITION/PLAN   DISPOSITION Decision To Discharge    Condition on Disposition    Stable    PATIENT REFERRED TO:  RENATO Agudelo  [address] [place] [address]  [phone]            DISCHARGE MEDICATIONS:  New Prescriptions    ONDANSETRON (ZOFRAN ODT) 4 MG DISINTEGRATING TABLET    Take 1 tablet by mouth every 8 hours as needed for Nausea       (Please note that portions of this note were completed with a voice recognition program.  Efforts were made to edit the dictations but occasionally words are mis-transcribed.)    Allie Nair MD,, MD, F.A.A.E.M.   Attending Emergency Physician                          Allie Nair MD  02/06/22 5212

## 2022-02-07 LAB
EKG ATRIAL RATE: 87 BPM
EKG P AXIS: 56 DEGREES
EKG P-R INTERVAL: 112 MS
EKG Q-T INTERVAL: 360 MS
EKG QRS DURATION: 84 MS
EKG QTC CALCULATION (BAZETT): 433 MS
EKG R AXIS: 81 DEGREES
EKG T AXIS: 9 DEGREES
EKG VENTRICULAR RATE: 87 BPM

## 2022-02-09 NOTE — RESULT ENCOUNTER NOTE
Abnormal test results, please notify patient. Is she still symptomatic. The culture results do not reflect the UA results. If so, start antibiotics that I previously sent into pharmacy.  HANNAH/LAURA

## 2022-02-11 DIAGNOSIS — E55.9 VITAMIN D DEFICIENCY: Primary | ICD-10-CM

## 2022-02-11 RX ORDER — ERGOCALCIFEROL 1.25 MG/1
50000 CAPSULE ORAL WEEKLY
Qty: 16 CAPSULE | Refills: 3 | Status: SHIPPED | OUTPATIENT
Start: 2022-02-11

## 2022-02-11 NOTE — RESULT ENCOUNTER NOTE
Please confirm with Maggie. Her chart says she is taking 50,000 BID with food. Is that so? If yes, then we need to investigate further.  HANNAH/LAURA

## 2022-02-15 ENCOUNTER — HOSPITAL ENCOUNTER (OUTPATIENT)
Dept: BONE DENSITY | Age: 62
Discharge: HOME | End: 2022-02-17
Payer: MEDICAID

## 2022-02-15 ENCOUNTER — HOSPITAL ENCOUNTER (OUTPATIENT)
Dept: MAMMOGRAPHY | Age: 62
Discharge: HOME | End: 2022-02-17
Payer: MEDICAID

## 2022-02-15 VITALS — BODY MASS INDEX: 23.92 KG/M2 | HEIGHT: 62 IN | WEIGHT: 130 LBS

## 2022-02-15 DIAGNOSIS — N95.1 MENOPAUSAL STATE: ICD-10-CM

## 2022-02-15 DIAGNOSIS — Z12.31 ENCOUNTER FOR SCREENING MAMMOGRAM FOR MALIGNANT NEOPLASM OF BREAST: ICD-10-CM

## 2022-02-15 PROCEDURE — 77063 BREAST TOMOSYNTHESIS BI: CPT

## 2022-02-15 PROCEDURE — 77085 DXA BONE DENSITY AXL VRT FX: CPT

## 2022-02-16 DIAGNOSIS — M81.0 AGE-RELATED OSTEOPOROSIS WITHOUT CURRENT PATHOLOGICAL FRACTURE: Primary | ICD-10-CM

## 2022-02-16 RX ORDER — ALENDRONATE SODIUM 70 MG/1
70 TABLET ORAL
Qty: 4 TABLET | Refills: 12 | Status: SHIPPED | OUTPATIENT
Start: 2022-02-16

## 2022-02-16 NOTE — RESULT ENCOUNTER NOTE
Abnormal test results, please notify patient. Osteoporosis. Repeat DEXA in 2 years. I will send prescription into pharmacy to treat osteoporosis. Weight bearing exercise 30 -50 minutes 3-5x/week.  HANNAH/LAURA

## 2022-03-09 ENCOUNTER — OFFICE VISIT (OUTPATIENT)
Dept: FAMILY MEDICINE CLINIC | Age: 62
End: 2022-03-09
Payer: MEDICAID

## 2022-03-09 ENCOUNTER — HOSPITAL ENCOUNTER (OUTPATIENT)
Dept: LAB | Age: 62
Discharge: HOME | End: 2022-03-09
Payer: MEDICAID

## 2022-03-09 VITALS
HEIGHT: 64 IN | DIASTOLIC BLOOD PRESSURE: 80 MMHG | BODY MASS INDEX: 22.71 KG/M2 | SYSTOLIC BLOOD PRESSURE: 122 MMHG | HEART RATE: 60 BPM | WEIGHT: 133 LBS

## 2022-03-09 DIAGNOSIS — I10 ESSENTIAL HYPERTENSION: Primary | ICD-10-CM

## 2022-03-09 DIAGNOSIS — I10 ESSENTIAL HYPERTENSION: ICD-10-CM

## 2022-03-09 DIAGNOSIS — M62.838 MUSCLE SPASM: ICD-10-CM

## 2022-03-09 DIAGNOSIS — E78.00 HYPERCHOLESTEROLEMIA: ICD-10-CM

## 2022-03-09 LAB
ALBUMIN SERPL-MCNC: 4.2 G/DL (ref 3.5–5.2)
ALBUMIN/GLOBULIN RATIO: 1.6 (ref 1–2.5)
ALP BLD-CCNC: 74 U/L (ref 35–104)
ALT SERPL-CCNC: 12 U/L (ref 5–33)
ANION GAP SERPL CALCULATED.3IONS-SCNC: 8 MMOL/L (ref 9–17)
AST SERPL-CCNC: 15 U/L
BILIRUB SERPL-MCNC: 0.25 MG/DL (ref 0.3–1.2)
BUN BLDV-MCNC: 15 MG/DL (ref 8–23)
BUN/CREAT BLD: 15 (ref 9–20)
CALCIUM SERPL-MCNC: 9.4 MG/DL (ref 8.6–10.4)
CHLORIDE BLD-SCNC: 105 MMOL/L (ref 98–107)
CHOLESTEROL/HDL RATIO: 3.6
CHOLESTEROL: 189 MG/DL
CO2: 29 MMOL/L (ref 20–31)
CREAT SERPL-MCNC: 0.97 MG/DL (ref 0.5–0.9)
GFR AFRICAN AMERICAN: >60 ML/MIN
GFR NON-AFRICAN AMERICAN: 59 ML/MIN
GFR SERPL CREATININE-BSD FRML MDRD: ABNORMAL ML/MIN/{1.73_M2}
GLUCOSE BLD-MCNC: 88 MG/DL (ref 70–99)
HDLC SERPL-MCNC: 52 MG/DL
LDL CHOLESTEROL: 121 MG/DL (ref 0–130)
POTASSIUM SERPL-SCNC: 4.8 MMOL/L (ref 3.7–5.3)
SODIUM BLD-SCNC: 142 MMOL/L (ref 135–144)
TOTAL PROTEIN: 6.8 G/DL (ref 6.4–8.3)
TRIGL SERPL-MCNC: 82 MG/DL

## 2022-03-09 PROCEDURE — G8420 CALC BMI NORM PARAMETERS: HCPCS | Performed by: PHYSICIAN ASSISTANT

## 2022-03-09 PROCEDURE — 3017F COLORECTAL CA SCREEN DOC REV: CPT | Performed by: PHYSICIAN ASSISTANT

## 2022-03-09 PROCEDURE — 80053 COMPREHEN METABOLIC PANEL: CPT

## 2022-03-09 PROCEDURE — G8427 DOCREV CUR MEDS BY ELIG CLIN: HCPCS | Performed by: PHYSICIAN ASSISTANT

## 2022-03-09 PROCEDURE — 4004F PT TOBACCO SCREEN RCVD TLK: CPT | Performed by: PHYSICIAN ASSISTANT

## 2022-03-09 PROCEDURE — 80061 LIPID PANEL: CPT

## 2022-03-09 PROCEDURE — 99212 OFFICE O/P EST SF 10 MIN: CPT

## 2022-03-09 PROCEDURE — 99213 OFFICE O/P EST LOW 20 MIN: CPT | Performed by: PHYSICIAN ASSISTANT

## 2022-03-09 PROCEDURE — G8484 FLU IMMUNIZE NO ADMIN: HCPCS | Performed by: PHYSICIAN ASSISTANT

## 2022-03-09 PROCEDURE — 36415 COLL VENOUS BLD VENIPUNCTURE: CPT

## 2022-03-09 RX ORDER — CYCLOBENZAPRINE HCL 10 MG
10 TABLET ORAL 2 TIMES DAILY PRN
Qty: 60 TABLET | Refills: 5 | Status: SHIPPED | OUTPATIENT
Start: 2022-03-09

## 2022-03-09 RX ORDER — LISINOPRIL 10 MG/1
TABLET ORAL
Qty: 30 TABLET | Refills: 5 | Status: SHIPPED | OUTPATIENT
Start: 2022-03-09 | End: 2022-09-19 | Stop reason: SDUPTHER

## 2022-03-09 RX ORDER — ATORVASTATIN CALCIUM 40 MG/1
40 TABLET, FILM COATED ORAL DAILY
Qty: 30 TABLET | Refills: 5 | Status: SHIPPED | OUTPATIENT
Start: 2022-03-09 | End: 2022-09-19 | Stop reason: SDUPTHER

## 2022-03-09 ASSESSMENT — ENCOUNTER SYMPTOMS
RESPIRATORY NEGATIVE: 1
BACK PAIN: 1
GASTROINTESTINAL NEGATIVE: 1

## 2022-03-09 ASSESSMENT — PATIENT HEALTH QUESTIONNAIRE - PHQ9
1. LITTLE INTEREST OR PLEASURE IN DOING THINGS: 0
SUM OF ALL RESPONSES TO PHQ QUESTIONS 1-9: 0
SUM OF ALL RESPONSES TO PHQ9 QUESTIONS 1 & 2: 0
SUM OF ALL RESPONSES TO PHQ QUESTIONS 1-9: 0
2. FEELING DOWN, DEPRESSED OR HOPELESS: 0

## 2022-03-09 NOTE — PROGRESS NOTES
Subjective:      Patient ID: Tariq Parker is a 62 y.o. female. Hypertension  This is a chronic problem. The current episode started more than 1 year ago. The problem is controlled. Pertinent negatives include no anxiety, malaise/fatigue or peripheral edema. Past treatments include ACE inhibitors. The current treatment provides significant improvement. There are no compliance problems. Hyperlipidemia  This is a chronic problem. The current episode started more than 1 year ago. The problem is controlled. She has no history of obesity. Current antihyperlipidemic treatment includes statins. The current treatment provides significant improvement of lipids. There are no compliance problems. Back Pain  This is a chronic problem. The current episode started more than 1 year ago. The pain is present in the lumbar spine. The quality of the pain is described as aching. She has tried NSAIDs and muscle relaxant for the symptoms. The treatment provided moderate relief. Review of Systems   Constitutional: Negative. Negative for malaise/fatigue. HENT: Negative. Respiratory: Negative. Cardiovascular: Negative. Gastrointestinal: Negative. Genitourinary: Negative. Musculoskeletal: Positive for back pain. Past Medical History:   Diagnosis Date    Abnormal Pap smear of cervix 1970    before her hysterectomy in 1972    CAD (coronary artery disease)     Chronic kidney disease     [facility] obstructive [facility] disease) ([facility] [address]     Hemorrhoids     History of herpes zoster 2002    (right lower rib area laterally and anteriorly).  History of thyroiditis 1992    History of ulcerative colitis     (of 20 years disease - though quiescent).  Hypercholesterolemia     Hypertension     Neuritis     Shortened [place] interval     (on EKG from 2004).  Tobacco abuse     Venous insufficiency, peripheral     (bilateral).      Past Surgical History:   Procedure Laterality Date    APPENDECTOMY 1982    (Dr. Kelly Moran) - Acute appendicitis.  CARDIAC CATHETERIZATION  2000    Preserved left ventricular systolic function. Ejection fraction 50%. Noncritical coronary disease. (Dr. Toby Malloy).  CHOLECYSTECTOMY  2001    COLONOSCOPY  1988    (Dr. Sharyle Massy) - Normal colon.  COLONOSCOPY  5/12/16    Polyp distal sigmoid, rectum - Dr Marco Antonio Dumont Left 8/5/2019    CYSTO Left Stent Insertion performed by Jaja Diggs MD at [place] [address]  2010    Incision and drainage of thrombosed hemorrhoid.  HYSTERECTOMY  2001    (no BSO).  LITHOTRIPSY Left 8/19/2019    CYSTO W/ STENT REMOVAL ET Left LITHOTRIPSY performed by Jaja Diggs MD at [facility] LITHOTRIPSY Left 8/9/2021    Left LITHOTRIPSY performed by Jaja Diggs MD at [address] surgery x8.    OTHER SURGICAL HISTORY  1987    Pelvic exam under anesthesia. (Dr. Niko Mcdaniel) - Chronic pelvic pain, chronic inflammatory process changes.  OVARY REMOVAL Right     TUBAL LIGATION  1982     Social History     Tobacco Use    Smoking status: Current Every Day Smoker     Packs/day: 1.00     Years: 15.00     Pack years: 15.00     Start date: 1/1/1981    Smokeless tobacco: Never Used    Tobacco comment: Christoph Price RRT 8/4/19   Vaping Use    Vaping Use: Never used   Substance Use Topics    Alcohol use: Yes     Alcohol/week: 0.0 standard drinks     Comment: occasional    Drug use: No       Objective:   Physical Exam  HENT:      Head: Normocephalic. Mouth/Throat:      Mouth: Mucous membranes are moist.   Eyes:      Pupils: Pupils are equal, round, and reactive to light. Cardiovascular:      Rate and Rhythm: Normal rate. Pulmonary:      Effort: Pulmonary effort is normal.      Breath sounds: Normal breath sounds. Abdominal:      General: Abdomen is flat. Musculoskeletal:      Cervical back: Neck supple. Lumbar back: Spasms and tenderness present. Neurological:      General: No focal deficit present. Mental Status: She is alert and oriented to person, place, and time. Psychiatric:         Mood and Affect: Mood normal.         Behavior: Behavior normal.         Assessment:      1. Essential hypertension    2. Hypercholesterolemia    3. Muscle spasm          Plan:      Interval history reviewed. Update laboratory studies. Healthy diet and routine exercise. Continue current medications. Patient declines pneumococcal vaccination. Follow-up in six months/sooner PRN.         Norval Cranker, PA

## 2022-03-10 ENCOUNTER — TELEPHONE (OUTPATIENT)
Dept: FAMILY MEDICINE CLINIC | Age: 62
End: 2022-03-10

## 2022-03-10 NOTE — TELEPHONE ENCOUNTER
----- Message from Nela Diggs [place] sent at 3/10/2022 12:16 PM EST -----  Stable laboratory studies.

## 2022-04-04 ENCOUNTER — OFFICE VISIT (OUTPATIENT)
Dept: UROLOGY | Age: 62
End: 2022-04-04
Payer: MEDICAID

## 2022-04-04 ENCOUNTER — HOSPITAL ENCOUNTER (OUTPATIENT)
Dept: GENERAL RADIOLOGY | Age: 62
Discharge: HOME | End: 2022-04-06
Payer: MEDICAID

## 2022-04-04 VITALS
RESPIRATION RATE: 16 BRPM | HEIGHT: 64 IN | BODY MASS INDEX: 22.88 KG/M2 | DIASTOLIC BLOOD PRESSURE: 78 MMHG | OXYGEN SATURATION: 96 % | SYSTOLIC BLOOD PRESSURE: 126 MMHG | HEART RATE: 86 BPM | WEIGHT: 134 LBS

## 2022-04-04 DIAGNOSIS — Z87.442 HISTORY OF KIDNEY STONES: ICD-10-CM

## 2022-04-04 DIAGNOSIS — N39.0 RECURRENT UTI: Primary | ICD-10-CM

## 2022-04-04 DIAGNOSIS — N20.0 KIDNEY STONES: ICD-10-CM

## 2022-04-04 PROCEDURE — 4004F PT TOBACCO SCREEN RCVD TLK: CPT | Performed by: UROLOGY

## 2022-04-04 PROCEDURE — G8420 CALC BMI NORM PARAMETERS: HCPCS | Performed by: UROLOGY

## 2022-04-04 PROCEDURE — 74018 RADEX ABDOMEN 1 VIEW: CPT

## 2022-04-04 PROCEDURE — 99214 OFFICE O/P EST MOD 30 MIN: CPT | Performed by: UROLOGY

## 2022-04-04 PROCEDURE — 99215 OFFICE O/P EST HI 40 MIN: CPT

## 2022-04-04 PROCEDURE — G8427 DOCREV CUR MEDS BY ELIG CLIN: HCPCS | Performed by: UROLOGY

## 2022-04-04 PROCEDURE — 3017F COLORECTAL CA SCREEN DOC REV: CPT | Performed by: UROLOGY

## 2022-04-04 NOTE — PROGRESS NOTES
[facility]  [address]  [place] [address]  Dept: [phone]  Dept Fax: [phone]  Loc: [phone]    [address], [address] [facility] Urology Office Note - Follow Up Visit    Patient:  Harvinder Garcia  YOB: 1960  Date: 4/4/2022    The patient is a 62 y.o. female who presents today for evaluation of the following problems:   Chief Complaint   Patient presents with    Nephrolithiasis     6 mo f/u        HISTORY OF PRESENT ILLNESS:     Kidney stones  Hx of ESWL Here with KUB. Stable stone. Resistant to ESWL  Had UTI and obstructing stone in past      Recurrent UTI  Had a UTI > 1 month ago with IV abx  Usually treats as they come. Not bothered. Requested/reviewed records from [facility], [address] [place] [address] office and/or outside physician/EMR    (Patient's old records have been requested, reviewed and pertinent findings summarized in today's note.)    Procedures Today: N/A    Last several PSA's:  No results found for: PSA    Last total testosterone:  No results found for: TESTOSTERONE    Urinalysis today:  No results found for this visit on 04/04/22. Last BUN and creatinine:  Lab Results   Component Value Date    BUN 15 03/09/2022     Lab Results   Component Value Date    CREATININE 0.97 (H) 03/09/2022       Additional Lab/Culture results: none    Imaging Reviewed during this Office Visit:   imaging independently reviewed by Nikolai Espinoza MD and radiology report verified demonstrating     XR ABDOMEN (KUB) (SINGLE AP VIEW)    Result Date: 4/4/2022  EXAMINATION: ONE SUPINE XRAY VIEW(S) OF THE ABDOMEN 4/4/2022 8:45 am COMPARISON: October 4, 2021 HISTORY: ORDERING SYSTEM PROVIDED HISTORY: Kidney stones TECHNOLOGIST PROVIDED HISTORY: Kidney/ureteral stones Reason for Exam: History of kidney stones, no current pain. FINDINGS: Bowel gas pattern nonobstructed. Renal shadows partially obscured by bowel gas and fecal debris. Stable left nephrolithiasis.   No definite ureteral stones. No acute osseous abnormality. Nonobstructive bowel gas pattern. Stable left nephrolithiasis. PAST MEDICAL, FAMILY AND SOCIAL HISTORY:  Past Medical History:   Diagnosis Date    Abnormal Pap smear of cervix 1970    before her hysterectomy in 1972    CAD (coronary artery disease)     Chronic kidney disease     COPD (chronic obstructive pulmonary disease) ([address]     Hemorrhoids     History of herpes zoster 2002    (right lower rib area laterally and anteriorly).  History of thyroiditis 1992    History of ulcerative colitis     (of 20 years disease - though quiescent).  Hypercholesterolemia     Hypertension     Neuritis     Shortened GA interval     (on EKG from 2004).  Tobacco abuse     Venous insufficiency, peripheral     (bilateral). Past Surgical History:   Procedure Laterality Date    APPENDECTOMY  1982    (Dr. Yeni Acosta) - Acute appendicitis.  CARDIAC CATHETERIZATION  2000    Preserved left ventricular systolic function. Ejection fraction 50%. Noncritical coronary disease. (Dr. Armando Barnett).  CHOLECYSTECTOMY  2001    COLONOSCOPY  1988    (Dr. Adrien Matos) - Normal colon.  COLONOSCOPY  5/12/16    Polyp distal sigmoid, rectum - Dr Derrek Germain Left 8/5/2019    CYSTO Left Stent Insertion performed by Rayo Zhang MD at [place] [address]  2010    Incision and drainage of thrombosed hemorrhoid.  HYSTERECTOMY  2001    (no BSO).  LITHOTRIPSY Left 8/19/2019    CYSTO W/ STENT REMOVAL ET Left LITHOTRIPSY performed by Rayo Zhang MD at [facility] LITHOTRIPSY Left 8/9/2021    Left LITHOTRIPSY performed by Rayo Zhang MD at [facility] surgery x8.    OTHER SURGICAL HISTORY  1987    Pelvic exam under anesthesia. (Dr. Rolf Beal) - Chronic pelvic pain, chronic inflammatory process changes.     OVARY REMOVAL Right     TUBAL LIGATION  1982     Family History   Problem Relation Age of Onset    Diabetes Mother    Gavi Other Mother         (history of polyps).  Arthritis Mother     Cancer Mother         breast    Breast Cancer Mother 79    COPD Father     Other Father         ('s disease)    Mental Illness Daughter         bipolar; schizophrenia    No Known Problems Paternal Aunt     No Known Problems Paternal Aunt     No Known Problems Maternal Aunt     No Known Problems Maternal Aunt     No Known Problems Maternal Aunt     No Known Problems Maternal Aunt      Outpatient Medications Marked as Taking for the 4/4/22 encounter (Office Visit) with Anderson Llamas MD   Medication Sig Dispense Refill    Probiotic Product (PROBIOTIC-10 PO) Take by mouth      cyclobenzaprine (FLEXERIL) 10 MG tablet Take 1 tablet by mouth 2 times daily as needed for Muscle spasms 60 tablet 5    lisinopril (PRINIVIL;ZESTRIL) 10 MG tablet take 1 tablet by mouth once daily 30 tablet 5    atorvastatin (LIPITOR) 40 MG tablet Take 1 tablet by mouth daily 30 tablet 5    alendronate (FOSAMAX) 70 MG tablet Take 1 tablet by mouth every 7 days 4 tablet 12    vitamin D (ERGOCALCIFEROL) 1.25 MG (20864 UT) CAPS capsule Take 1 capsule by mouth once a week 16 capsule 3    ondansetron (ZOFRAN ODT) 4 MG disintegrating tablet Take 1 tablet by mouth every 8 hours as needed for Nausea 20 tablet 0    ibuprofen (ADVIL;MOTRIN) 800 MG tablet Take 1 tablet by mouth 3 times daily as needed for Pain 90 tablet 5    silver sulfADIAZINE (SILVADENE) 1 % cream Apply topically daily. 30 g 1    albuterol sulfate  (90 Base) MCG/ACT inhaler Inhale 2 puffs into the lungs every 6 hours as needed for Wheezing 1 Inhaler 1    Glucosamine-Chondroit-Vit C-Mn (GLUCOSAMINE 1500 COMPLEX PO) Take by mouth daily      CRANBERRY PO Take by mouth daily      Multiple Vitamins-Minerals (HAIR/SKIN/NAILS PO) Take by mouth daily      ascorbic acid (VITAMIN C) 500 MG tablet Take 500 mg by mouth daily          Patient has no known allergies.   Social History     Tobacco Use Smoking Status Current Every Day Smoker    Packs/day: 1.00    Years: 15.00    Pack years: 15.00    Start date: 1/1/1981   Smokeless Tobacco Never Used   Tobacco Comment    S Sienna Fernandez RRT 8/4/19      (If patient a smoker, smoking cessation counseling offered)   Social History     Substance and Sexual Activity   Alcohol Use Yes    Alcohol/week: 0.0 standard drinks    Comment: occasional       REVIEW OF SYSTEMS:  Constitutional: negative  Eyes: negative  Respiratory: negative  Cardiovascular: negative  Gastrointestinal: negative  Genitourinary: see HPI  Musculoskeletal: negative  Skin: negative   Neurological: negative  Hematological/Lymphatic: negative  Psychological: negative        Physical Exam:    This a 62 y.o. female  Vitals:    04/04/22 0926   BP: 126/78   Pulse: 86   Resp: 16   SpO2: 96%     Body mass index is 23 kg/m². Constitutional: Patient in no acute distress;         Assessment and Plan        1. Recurrent UTI    2. History of kidney stones               Plan:       Recurrent UTI- recently treated last month  Left kidney stone- could be nidus of UTI's. Resistant to ESWL. Hx of septic stone in past. Will schedule ureteroscopy w holmium laser on left side. Prescriptions Ordered:  No orders of the defined types were placed in this encounter. Orders Placed:  No orders of the defined types were placed in this encounter.            Nikolai Espinoza MD

## 2022-04-08 ENCOUNTER — TELEPHONE (OUTPATIENT)
Dept: UROLOGY | Age: 62
End: 2022-04-08

## 2022-04-08 NOTE — TELEPHONE ENCOUNTER
[facility] SPECIALTY [facility]    Pre-Operative Evaluation/Consultation    Name:  Geronimo Larkin                                         Age:  62 y.o. MRN:  [id number]       :  1960   Date:  2022         Sex: female    There were no encounter diagnoses. Surgeon:  Dr. Pat Starkey  Procedure (Planned):  Cystoscopy with ureteroscopy with holmium laser and stent placement  Date Scheduled surgery: 22    Attending : No att. providers found    Primary Physician:Fidelina Leavitt, [address]  Cardiologist: None    Type of Anesthesia Requested: General    Patient Medical history:  No Known Allergies  Patient Active Problem List   Diagnosis    CAD (coronary artery disease)    Tobacco abuse    Hypertension    Hypercholesterolemia    History of thyroiditis    Insomnia    Hydronephrosis with obstructing calculus    Kidney stone    Vitamin D deficiency     Past Medical History:   Diagnosis Date    Abnormal Pap smear of cervix     before her hysterectomy in     CAD (coronary artery disease)     Chronic kidney disease     COPD (chronic obstructive pulmonary disease) ([address]     Hemorrhoids     History of herpes zoster     (right lower rib area laterally and anteriorly).  History of thyroiditis     History of ulcerative colitis     (of 20 years disease - though quiescent).  Hypercholesterolemia     Hypertension     Neuritis     Shortened NV interval     (on EKG from ).  Tobacco abuse     Venous insufficiency, peripheral     (bilateral). Past Surgical History:   Procedure Laterality Date    APPENDECTOMY      (Dr. Mame Trevino) - Acute appendicitis.  CARDIAC CATHETERIZATION      Preserved left ventricular systolic function. Ejection fraction 50%. Noncritical coronary disease. (Dr. Whit Domínguez).  CHOLECYSTECTOMY      COLONOSCOPY      (Dr. Gabo Ventura) - Normal colon.     COLONOSCOPY  16    Polyp distal sigmoid, rectum - Dr Lonnie Herring Left 2019 CYSTO Left Stent Insertion performed by Jeremiah Arrington MD at [facility]  2010    Incision and drainage of thrombosed hemorrhoid.  HYSTERECTOMY  2001    (no BSO).  LITHOTRIPSY Left 8/19/2019    CYSTO W/ STENT REMOVAL ET Left LITHOTRIPSY performed by Jeremiah Arrington MD at [facility] LITHOTRIPSY Left 8/9/2021    Left LITHOTRIPSY performed by Jeremiah Arrington MD at [facility] surgery x8.    OTHER SURGICAL HISTORY  1987    Pelvic exam under anesthesia. (Dr. Sav Costello) - Chronic pelvic pain, chronic inflammatory process changes.  OVARY REMOVAL Right     TUBAL LIGATION  1982     Social History     Tobacco Use    Smoking status: Current Every Day Smoker     Packs/day: 1.00     Years: 15.00     Pack years: 15.00     Start date: 1/1/1981    Smokeless tobacco: Never Used    Tobacco comment: Rere Kelly RRT 8/4/19   Vaping Use    Vaping Use: Never used   Substance Use Topics    Alcohol use:  Yes     Alcohol/week: 0.0 standard drinks     Comment: occasional    Drug use: No     Medications:  Current Outpatient Medications   Medication Sig Dispense Refill    Probiotic Product (PROBIOTIC-10 PO) Take by mouth      cyclobenzaprine (FLEXERIL) 10 MG tablet Take 1 tablet by mouth 2 times daily as needed for Muscle spasms 60 tablet 5    lisinopril (PRINIVIL;ZESTRIL) 10 MG tablet take 1 tablet by mouth once daily 30 tablet 5    atorvastatin (LIPITOR) 40 MG tablet Take 1 tablet by mouth daily 30 tablet 5    alendronate (FOSAMAX) 70 MG tablet Take 1 tablet by mouth every 7 days 4 tablet 12    vitamin D (ERGOCALCIFEROL) 1.25 MG (74421 UT) CAPS capsule Take 1 capsule by mouth once a week 16 capsule 3    ondansetron (ZOFRAN ODT) 4 MG disintegrating tablet Take 1 tablet by mouth every 8 hours as needed for Nausea 20 tablet 0    ibuprofen (ADVIL;MOTRIN) 800 MG tablet Take 1 tablet by mouth 3 times daily as needed for Pain 90 tablet 5    silver sulfADIAZINE (SILVADENE) 1 % cream Apply topically daily. 30 g 1    albuterol sulfate  (90 Base) MCG/ACT inhaler Inhale 2 puffs into the lungs every 6 hours as needed for Wheezing 1 Inhaler 1    Glucosamine-Chondroit-Vit C-Mn (GLUCOSAMINE 1500 COMPLEX PO) Take by mouth daily      CRANBERRY PO Take by mouth daily      Multiple Vitamins-Minerals (HAIR/SKIN/NAILS PO) Take by mouth daily      ascorbic acid (VITAMIN C) 500 MG tablet Take 500 mg by mouth daily        No current facility-administered medications for this visit. Scheduled Meds:  Continuous Infusions:  PRN Meds:. Prior to Admission medications    Medication Sig Start Date End Date Taking? Authorizing Provider   Probiotic Product (PROBIOTIC-10 PO) Take by mouth    Historical Provider, MD   cyclobenzaprine (FLEXERIL) 10 MG tablet Take 1 tablet by mouth 2 times daily as needed for Muscle spasms 3/9/22   RENATO Keane   lisinopril (PRINIVIL;ZESTRIL) 10 MG tablet take 1 tablet by mouth once daily 3/9/22   RENATO Keane   atorvastatin (LIPITOR) 40 MG tablet Take 1 tablet by mouth daily 3/9/22   RENATO Keane   alendronate (FOSAMAX) 70 MG tablet Take 1 tablet by mouth every 7 days 2/16/22   Minor Ego, APRN - CNM   vitamin D (ERGOCALCIFEROL) 1.25 MG (16229 UT) CAPS capsule Take 1 capsule by mouth once a week 2/11/22   Minor Ego, APRN - CNM   ondansetron (ZOFRAN ODT) 4 MG disintegrating tablet Take 1 tablet by mouth every 8 hours as needed for Nausea 2/6/22   Edouard May MD   ibuprofen (ADVIL;MOTRIN) 800 MG tablet Take 1 tablet by mouth 3 times daily as needed for Pain 9/9/21   RENATO Keane   silver sulfADIAZINE (SILVADENE) 1 % cream Apply topically daily.  4/28/21   Cristal Turner DPM   albuterol sulfate  (90 Base) MCG/ACT inhaler Inhale 2 puffs into the lungs every 6 hours as needed for Wheezing 4/3/20   Lulla Lesch, MD   Glucosamine-Chondroit-Vit C-Mn (GLUCOSAMINE 1500 COMPLEX PO) Take by mouth daily    Historical Provider, MD CRANBERRY PO Take by mouth daily    Historical Provider, MD   Multiple Vitamins-Minerals (HAIR/SKIN/NAILS PO) Take by mouth daily    Historical Provider, MD   ascorbic acid (VITAMIN C) 500 MG tablet Take 500 mg by mouth daily     Historical Provider, MD     Vital Signs (Current) [unfilled]    Weight:   Wt Readings from Last 1 Encounters:   04/04/22 134 lb (60.8 kg)     Height:   Ht Readings from Last 1 Encounters:   04/04/22 5' 4\" (1.626 m)      BMI:  There is no height or weight on file to calculate BMI. Estimated body mass index is 23 kg/m² as calculated from the following:    Height as of 4/4/22: 5' 4\" (1.626 m). Weight as of 4/4/22: 134 lb (60.8 kg). body mass index is unknown because there is no height or weight on file. Cardiac Clearance: None   Medical Clearance: None  Appointment for surgery Clearance scheduled for:None     Preoperative Testing: These are the current and completed labs:  CBC:   Lab Results   Component Value Date    WBC 11.7 02/06/2022    RBC 5.18 02/06/2022    HGB 15.9 02/06/2022    HCT 47.9 02/06/2022    MCV 92.5 02/06/2022    RDW 12.9 02/06/2022     02/06/2022     CMP:   Lab Results   Component Value Date     03/09/2022    K 4.8 03/09/2022     03/09/2022    CO2 29 03/09/2022    BUN 15 03/09/2022    CREATININE 0.97 03/09/2022    GFRAA >60 03/09/2022    LABGLOM 59 03/09/2022    GLUCOSE 88 03/09/2022    PROT 6.8 03/09/2022    CALCIUM 9.4 03/09/2022    BILITOT 0.25 03/09/2022    ALKPHOS 74 03/09/2022    AST 15 03/09/2022    ALT 12 03/09/2022     POC Tests: No results for input(s): POCGLU, POCNA, POCK, POCCL, POCBUN, POCHEMO, POCHCT in the last 72 hours.   Coags  No results found for: PROTIME, INR, APTT  HCG (If Applicable) No results found for: PREGTESTUR, PREGSERUM, HCG, HCGQUANT   ABGs No results found for: PHART, PO2ART, ROP8LKT, CDL7TMV, BEART, D4VJYGCS   Type & Screen (If Applicable)  No results found for: LABABO, LABRH    Additional ordered pre-operative testing:  []CBC    []ABG      [] BMP   []URINALYSIS   []CMP    []HCG   []COAGS PT/INR  []T&C  []LFTs   []TYPE AND SCREEN    [] EKG  [] Chest X-Ray  [] Other Radiology    [] Sent to Hospitalist None  [] Sent to Anesthesia for your review: None   [] Additional Orders: None     Comments:None   Requests: No Special requests       Signed: Sabino Young LPN 0/1/7282 87:67 AM

## 2022-04-11 ENCOUNTER — TELEPHONE (OUTPATIENT)
Dept: UROLOGY | Age: 62
End: 2022-04-11

## 2022-04-11 NOTE — TELEPHONE ENCOUNTER
Patient no showed appointment with Dr. Hurt Blood today at 1:00 pm. Writer left message on patient's voicemail with clinic phone number to reschedule. No show letter mailed.

## 2022-04-12 ENCOUNTER — TELEPHONE (OUTPATIENT)
Dept: OTOLARYNGOLOGY | Age: 62
End: 2022-04-12

## 2022-04-12 NOTE — TELEPHONE ENCOUNTER
Patient called the office to speak with Geo Tatum in ENT. Writer informed patient Geo Tatum is out of the office today but would be back tomorrow. Patient work tomorrow 2 pm-9 pm, please call her back before this at [phone].

## 2022-04-14 NOTE — TELEPHONE ENCOUNTER
Spoke to Addie Cano today and she called Dr. Jurgen Egan office who had told me that they did accept [employer]  And scheduled her in their [place] office today Addie Cano was told different she was told the surg would be not be covered so were back at square one looking for a carpenter provider for inner ear surgery no farther than [place] as Addie Cano states they can not go any farther than that.

## 2022-04-26 NOTE — TELEPHONE ENCOUNTER
I spoke to Karissa Berna recently and tried to find her a provider around [place] area as she said they could not go any further than [place] due to their transportation. I type in [place] provod[facility] ENT surgeons and nothing really comes up other than the [facility] wich Im not so sure [place] would be eligible as [place] is pretty strict with rules that require the cisneros to stay in a [facility] facility which Im having a hard time finding for Karissa Coronel. I talked with her  today and he states after first of the year h will have blue cross blue shield which I believe well have a better chance finding a good surgeon tin the region Karissa Coronel is able to drive to. I will keep trying I ask her  to have Karissa Coronel try to give [place] a call to find the proper provider.

## 2022-05-16 ENCOUNTER — APPOINTMENT (OUTPATIENT)
Dept: GENERAL RADIOLOGY | Age: 62
End: 2022-05-16
Attending: UROLOGY
Payer: MEDICAID

## 2022-05-16 ENCOUNTER — ANESTHESIA (OUTPATIENT)
Dept: OPERATING ROOM | Age: 62
End: 2022-05-16
Payer: MEDICAID

## 2022-05-16 ENCOUNTER — ANESTHESIA EVENT (OUTPATIENT)
Dept: OPERATING ROOM | Age: 62
End: 2022-05-16
Payer: MEDICAID

## 2022-05-16 ENCOUNTER — HOSPITAL ENCOUNTER (OUTPATIENT)
Age: 62
Setting detail: OUTPATIENT SURGERY
Discharge: HOME | End: 2022-05-16
Attending: UROLOGY | Admitting: UROLOGY
Payer: MEDICAID

## 2022-05-16 VITALS
HEART RATE: 78 BPM | HEIGHT: 64 IN | TEMPERATURE: 97.1 F | SYSTOLIC BLOOD PRESSURE: 141 MMHG | BODY MASS INDEX: 22.4 KG/M2 | DIASTOLIC BLOOD PRESSURE: 67 MMHG | WEIGHT: 131.2 LBS | RESPIRATION RATE: 18 BRPM | OXYGEN SATURATION: 96 %

## 2022-05-16 DIAGNOSIS — N20.0 KIDNEY STONE: Primary | ICD-10-CM

## 2022-05-16 PROCEDURE — 94640 AIRWAY INHALATION TREATMENT: CPT

## 2022-05-16 PROCEDURE — 2580000003 HC RX 258: Performed by: UROLOGY

## 2022-05-16 PROCEDURE — 7100000001 HC PACU RECOVERY - ADDTL 15 MIN: Performed by: UROLOGY

## 2022-05-16 PROCEDURE — 3209999900 FLUORO FOR SURGICAL PROCEDURES

## 2022-05-16 PROCEDURE — 7100000000 HC PACU RECOVERY - FIRST 15 MIN: Performed by: UROLOGY

## 2022-05-16 PROCEDURE — 2500000003 HC RX 250 WO HCPCS: Performed by: NURSE ANESTHETIST, CERTIFIED REGISTERED

## 2022-05-16 PROCEDURE — C1894 INTRO/SHEATH, NON-LASER: HCPCS | Performed by: UROLOGY

## 2022-05-16 PROCEDURE — 2709999900 HC NON-CHARGEABLE SUPPLY: Performed by: UROLOGY

## 2022-05-16 PROCEDURE — 7100000010 HC PHASE II RECOVERY - FIRST 15 MIN: Performed by: UROLOGY

## 2022-05-16 PROCEDURE — 6360000002 HC RX W HCPCS: Performed by: UROLOGY

## 2022-05-16 PROCEDURE — 3700000001 HC ADD 15 MINUTES (ANESTHESIA): Performed by: UROLOGY

## 2022-05-16 PROCEDURE — 6370000000 HC RX 637 (ALT 250 FOR IP): Performed by: NURSE ANESTHETIST, CERTIFIED REGISTERED

## 2022-05-16 PROCEDURE — 6360000002 HC RX W HCPCS: Performed by: NURSE ANESTHETIST, CERTIFIED REGISTERED

## 2022-05-16 PROCEDURE — 3700000000 HC ANESTHESIA ATTENDED CARE: Performed by: UROLOGY

## 2022-05-16 PROCEDURE — C1769 GUIDE WIRE: HCPCS | Performed by: UROLOGY

## 2022-05-16 PROCEDURE — 7100000011 HC PHASE II RECOVERY - ADDTL 15 MIN: Performed by: UROLOGY

## 2022-05-16 PROCEDURE — 3600000004 HC SURGERY LEVEL 4 BASE: Performed by: UROLOGY

## 2022-05-16 PROCEDURE — 3600000014 HC SURGERY LEVEL 4 ADDTL 15MIN: Performed by: UROLOGY

## 2022-05-16 DEVICE — URETERAL STENT
Type: IMPLANTABLE DEVICE | Site: URETER | Status: FUNCTIONAL
Brand: CONTOUR™

## 2022-05-16 RX ORDER — ROCURONIUM BROMIDE 10 MG/ML
INJECTION, SOLUTION INTRAVENOUS PRN
Status: DISCONTINUED | OUTPATIENT
Start: 2022-05-16 | End: 2022-05-16 | Stop reason: SDUPTHER

## 2022-05-16 RX ORDER — SODIUM CHLORIDE 0.9 % (FLUSH) 0.9 %
5-40 SYRINGE (ML) INJECTION PRN
Status: DISCONTINUED | OUTPATIENT
Start: 2022-05-16 | End: 2022-05-16 | Stop reason: HOSPADM

## 2022-05-16 RX ORDER — FENTANYL CITRATE 50 UG/ML
INJECTION, SOLUTION INTRAMUSCULAR; INTRAVENOUS PRN
Status: DISCONTINUED | OUTPATIENT
Start: 2022-05-16 | End: 2022-05-16 | Stop reason: SDUPTHER

## 2022-05-16 RX ORDER — DEXAMETHASONE SODIUM PHOSPHATE 10 MG/ML
INJECTION INTRAMUSCULAR; INTRAVENOUS PRN
Status: DISCONTINUED | OUTPATIENT
Start: 2022-05-16 | End: 2022-05-16 | Stop reason: SDUPTHER

## 2022-05-16 RX ORDER — IPRATROPIUM BROMIDE AND ALBUTEROL SULFATE 2.5; .5 MG/3ML; MG/3ML
1 SOLUTION RESPIRATORY (INHALATION) ONCE
Status: COMPLETED | OUTPATIENT
Start: 2022-05-16 | End: 2022-05-16

## 2022-05-16 RX ORDER — TAMSULOSIN HYDROCHLORIDE 0.4 MG/1
0.4 CAPSULE ORAL DAILY
Qty: 10 CAPSULE | Refills: 0 | Status: SHIPPED | OUTPATIENT
Start: 2022-05-16 | End: 2022-06-13

## 2022-05-16 RX ORDER — GLYCOPYRROLATE 1 MG/5 ML
SYRINGE (ML) INTRAVENOUS PRN
Status: DISCONTINUED | OUTPATIENT
Start: 2022-05-16 | End: 2022-05-16 | Stop reason: SDUPTHER

## 2022-05-16 RX ORDER — LIDOCAINE HYDROCHLORIDE 20 MG/ML
INJECTION, SOLUTION EPIDURAL; INFILTRATION; INTRACAUDAL; PERINEURAL PRN
Status: DISCONTINUED | OUTPATIENT
Start: 2022-05-16 | End: 2022-05-16 | Stop reason: SDUPTHER

## 2022-05-16 RX ORDER — OXYBUTYNIN CHLORIDE 10 MG/1
10 TABLET, EXTENDED RELEASE ORAL DAILY
Qty: 7 TABLET | Refills: 0 | Status: SHIPPED | OUTPATIENT
Start: 2022-05-16 | End: 2022-06-13

## 2022-05-16 RX ORDER — ONDANSETRON 2 MG/ML
INJECTION INTRAMUSCULAR; INTRAVENOUS PRN
Status: DISCONTINUED | OUTPATIENT
Start: 2022-05-16 | End: 2022-05-16 | Stop reason: SDUPTHER

## 2022-05-16 RX ORDER — FENTANYL CITRATE 50 UG/ML
50 INJECTION, SOLUTION INTRAMUSCULAR; INTRAVENOUS EVERY 5 MIN PRN
Status: COMPLETED | OUTPATIENT
Start: 2022-05-16 | End: 2022-05-16

## 2022-05-16 RX ORDER — SODIUM CHLORIDE, SODIUM LACTATE, POTASSIUM CHLORIDE, CALCIUM CHLORIDE 600; 310; 30; 20 MG/100ML; MG/100ML; MG/100ML; MG/100ML
INJECTION, SOLUTION INTRAVENOUS CONTINUOUS
Status: DISCONTINUED | OUTPATIENT
Start: 2022-05-16 | End: 2022-05-16 | Stop reason: HOSPADM

## 2022-05-16 RX ORDER — SODIUM CHLORIDE 0.9 % (FLUSH) 0.9 %
5-40 SYRINGE (ML) INJECTION EVERY 12 HOURS SCHEDULED
Status: DISCONTINUED | OUTPATIENT
Start: 2022-05-16 | End: 2022-05-16 | Stop reason: HOSPADM

## 2022-05-16 RX ORDER — NEOSTIGMINE METHYLSULFATE 1 MG/ML
INJECTION, SOLUTION INTRAVENOUS PRN
Status: DISCONTINUED | OUTPATIENT
Start: 2022-05-16 | End: 2022-05-16 | Stop reason: SDUPTHER

## 2022-05-16 RX ORDER — HYDROCODONE BITARTRATE AND ACETAMINOPHEN 5; 325 MG/1; MG/1
1 TABLET ORAL EVERY 6 HOURS PRN
Qty: 18 TABLET | Refills: 0 | Status: SHIPPED | OUTPATIENT
Start: 2022-05-16 | End: 2022-05-21

## 2022-05-16 RX ORDER — SODIUM CHLORIDE 9 MG/ML
INJECTION, SOLUTION INTRAVENOUS PRN
Status: DISCONTINUED | OUTPATIENT
Start: 2022-05-16 | End: 2022-05-16 | Stop reason: HOSPADM

## 2022-05-16 RX ORDER — CEPHALEXIN 500 MG/1
500 CAPSULE ORAL 3 TIMES DAILY
Qty: 15 CAPSULE | Refills: 0 | Status: SHIPPED | OUTPATIENT
Start: 2022-05-16 | End: 2022-05-21

## 2022-05-16 RX ORDER — PROPOFOL 10 MG/ML
INJECTION, EMULSION INTRAVENOUS PRN
Status: DISCONTINUED | OUTPATIENT
Start: 2022-05-16 | End: 2022-05-16 | Stop reason: SDUPTHER

## 2022-05-16 RX ADMIN — FENTANYL CITRATE 50 MCG: 50 INJECTION, SOLUTION INTRAMUSCULAR; INTRAVENOUS at 15:56

## 2022-05-16 RX ADMIN — Medication 3 MG: at 14:26

## 2022-05-16 RX ADMIN — LIDOCAINE HYDROCHLORIDE 100 MG: 20 INJECTION, SOLUTION EPIDURAL; INFILTRATION; INTRACAUDAL; PERINEURAL at 14:03

## 2022-05-16 RX ADMIN — ROCURONIUM BROMIDE 30 MG: 10 INJECTION, SOLUTION INTRAVENOUS at 14:03

## 2022-05-16 RX ADMIN — DEXAMETHASONE SODIUM PHOSPHATE 10 MG: 10 INJECTION INTRAMUSCULAR; INTRAVENOUS at 14:09

## 2022-05-16 RX ADMIN — IPRATROPIUM BROMIDE AND ALBUTEROL SULFATE 1 AMPULE: .5; 3 SOLUTION RESPIRATORY (INHALATION) at 14:56

## 2022-05-16 RX ADMIN — Medication 0.4 MG: at 14:26

## 2022-05-16 RX ADMIN — FENTANYL CITRATE 50 MCG: 50 INJECTION, SOLUTION INTRAMUSCULAR; INTRAVENOUS at 15:44

## 2022-05-16 RX ADMIN — ONDANSETRON 4 MG: 2 INJECTION INTRAMUSCULAR; INTRAVENOUS at 14:26

## 2022-05-16 RX ADMIN — FENTANYL CITRATE 50 MCG: 50 INJECTION, SOLUTION INTRAMUSCULAR; INTRAVENOUS at 14:03

## 2022-05-16 RX ADMIN — Medication 2000 MG: at 14:01

## 2022-05-16 RX ADMIN — FENTANYL CITRATE 50 MCG: 50 INJECTION, SOLUTION INTRAMUSCULAR; INTRAVENOUS at 14:19

## 2022-05-16 RX ADMIN — PROPOFOL 150 MG: 10 INJECTION, EMULSION INTRAVENOUS at 14:03

## 2022-05-16 RX ADMIN — SODIUM CHLORIDE, POTASSIUM CHLORIDE, SODIUM LACTATE AND CALCIUM CHLORIDE: 600; 310; 30; 20 INJECTION, SOLUTION INTRAVENOUS at 13:22

## 2022-05-16 ASSESSMENT — PAIN DESCRIPTION - FREQUENCY
FREQUENCY: CONTINUOUS

## 2022-05-16 ASSESSMENT — PAIN DESCRIPTION - DESCRIPTORS
DESCRIPTORS: ACHING

## 2022-05-16 ASSESSMENT — PAIN DESCRIPTION - ORIENTATION
ORIENTATION: LEFT
ORIENTATION: LEFT;POSTERIOR
ORIENTATION: LEFT
ORIENTATION: LEFT
ORIENTATION: LEFT;POSTERIOR
ORIENTATION: LEFT

## 2022-05-16 ASSESSMENT — PAIN DESCRIPTION - LOCATION
LOCATION: FLANK

## 2022-05-16 ASSESSMENT — PAIN DESCRIPTION - PAIN TYPE
TYPE: SURGICAL PAIN

## 2022-05-16 ASSESSMENT — PAIN SCALES - GENERAL
PAINLEVEL_OUTOF10: 7
PAINLEVEL_OUTOF10: 7
PAINLEVEL_OUTOF10: 6
PAINLEVEL_OUTOF10: 7
PAINLEVEL_OUTOF10: 7

## 2022-05-16 ASSESSMENT — PAIN - FUNCTIONAL ASSESSMENT: PAIN_FUNCTIONAL_ASSESSMENT: NONE - DENIES PAIN

## 2022-05-16 NOTE — H&P
Francheska Li MD  History and Physical    Patient:  Jessica Crowe  MRN: [id number]  YOB: 1960    HISTORY OF PRESENT ILLNESS:     The patient is a 62 y.o. female who presents with left ureteral stone/upj stone. Here for procedure. Patient's old records, notes and chart reviewed and summarized above. Francheska Li MD independently reviewed the images and verified the radiology reports from:    XR ABDOMEN (KUB) (SINGLE AP VIEW)    Result Date: 4/4/2022  EXAMINATION: ONE SUPINE XRAY VIEW(S) OF THE ABDOMEN 4/4/2022 8:45 am COMPARISON: October 4, 2021 HISTORY: ORDERING SYSTEM PROVIDED HISTORY: Kidney stones TECHNOLOGIST PROVIDED HISTORY: Kidney/ureteral stones Reason for Exam: History of kidney stones, no current pain. FINDINGS: Bowel gas pattern nonobstructed. Renal shadows partially obscured by bowel gas and fecal debris. Stable left nephrolithiasis. No definite ureteral stones. No acute osseous abnormality. Nonobstructive bowel gas pattern. Stable left nephrolithiasis. Past Medical History:    Past Medical History:   Diagnosis Date    Abnormal Pap smear of cervix 1970    before her hysterectomy in 1972    CAD (coronary artery disease)     Chronic kidney disease     COPD [facility] obstructive [facility] disease) [facility] [address]     Hemorrhoids     History of herpes zoster 2002    (right lower rib area laterally and anteriorly).  History of thyroiditis 1992    History of ulcerative colitis     (of 20 years disease - though quiescent).  Hypercholesterolemia     Hypertension     Neuritis     Shortened MA interval     (on EKG from 2004).  Tobacco abuse     Venous insufficiency, peripheral     (bilateral). Past Surgical History:    Past Surgical History:   Procedure Laterality Date    APPENDECTOMY  1982    (Dr. Skyla Cardoso) - Acute appendicitis.  CARDIAC CATHETERIZATION  2000    Preserved left ventricular systolic function. Ejection fraction 50%.  Noncritical coronary cream Apply topically daily. 4/28/21   Trinidad Pino DPM   albuterol sulfate  (90 Base) MCG/ACT inhaler Inhale 2 puffs into the lungs every 6 hours as needed for Wheezing 4/3/20   Yamilka Gunter MD   Glucosamine-Chondroit-Vit C-Mn (GLUCOSAMINE 1500 COMPLEX PO) Take by mouth daily    Historical Provider, MD   CRANBERRY PO Take by mouth daily    Historical Provider, MD   Multiple Vitamins-Minerals (HAIR/SKIN/NAILS PO) Take by mouth daily    Historical Provider, MD   ascorbic acid (VITAMIN C) 500 MG tablet Take 500 mg by mouth daily     Historical Provider, MD       Allergies:  Patient has no known allergies. Social History:    Social History     Socioeconomic History    Marital status:      Spouse name: Not on file    Number of children: Not on file    Years of education: Not on file    Highest education level: Not on file   Occupational History    Not on file   Tobacco Use    Smoking status: Current Every Day Smoker     Packs/day: 1.00     Years: 15.00     Pack years: 15.00     Start date: 1/1/1981    Smokeless tobacco: Never Used    Tobacco comment: Chad Shintim RRT 8/4/19   Vaping Use    Vaping Use: Never used   Substance and Sexual Activity    Alcohol use: Yes     Alcohol/week: 0.0 standard drinks     Comment: occasional    Drug use: No    Sexual activity: Yes     Partners: Male   Other Topics Concern    Not on file   Social History Narrative    Not on file     Social Determinants of Health     Financial Resource Strain: Low Risk     Difficulty of Paying Living Expenses: Not hard at all   Food Insecurity: No Food Insecurity    Worried About [address] in the Last Year: Never true    [facility] in the Last Year: Never true   Transportation Needs:     Lack of Transportation (Medical): Not on file    Lack of Transportation (Non-Medical):  Not on file   Physical Activity:     Days of Exercise per Week: Not on file    Minutes of Exercise per Session: Not on file Stress:     Feeling of Stress : Not on file   Social Connections:     Frequency of Communication with Friends and Family: Not on file    Frequency of Social Gatherings with Friends and Family: Not on file    Attends [employer]ism Services: Not on file    Active Member of [address] [place] [address] [employer] or Organizations: Not on file    Attends Club or Organization Meetings: Not on file    Marital Status: Not on file   Intimate Partner Violence:     Fear of Current or Ex-Partner: Not on file    Emotionally Abused: Not on file    Physically Abused: Not on file    Sexually Abused: Not on file   Housing Stability:     Unable to Pay for Housing in the Last Year: Not on file    Number of [place] in the Last Year: Not on file    Unstable Housing in the Last Year: Not on file       Family History:    Family History   Problem Relation Age of Onset    Diabetes Mother     Other Mother         (history of polyps).  Arthritis Mother     Cancer Mother         breast    Breast Cancer Mother 79    COPD Father     Other Father         ('s disease)    Mental Illness Daughter         bipolar; schizophrenia    No Known Problems Paternal Aunt     No Known Problems Paternal Aunt     No Known Problems Maternal Aunt     No Known Problems Maternal Aunt     No Known Problems Maternal Aunt     No Known Problems Maternal Aunt        REVIEW OF SYSTEMS:  Constitutional: negative  Eyes: negative  Respiratory: negative  Cardiovascular: negative  Gastrointestinal: negative  Genitourinary: no acute issues  Musculoskeletal: negative  Skin: negative   Neurological: negative  Hematological/Lymphatic: negative  Psychological: negative    Physical Exam:      No data found. Constitutional: Patient in no acute distress; Neuro: alert and oriented to person place and time. Psych: Mood and affect normal.  Skin: Normal  Lungs: Respiratory effort normal, CTA  Cardiovascular:  Normal peripheral pulses; no murmur.  Normal rhythm  Abdomen: Soft, non-tender, non-distended with no CVA, flank pain, hepatosplenomegaly or hernia. Kidneys normal.  Bladder non-tender and not distended. LABS:   No results for input(s): WBC, HGB, HCT, MCV, PLT in the last 72 hours. No results for input(s): NA, K, CL, CO2, PHOS, BUN, CREATININE, CA in the last 72 hours. No results found for: PSA      Urinalysis: No results for input(s): COLORU, PHUR, LABCAST, WBCUA, RBCUA, MUCUS, TRICHOMONAS, YEAST, BACTERIA, CLARITYU, SPECGRAV, LEUKOCYTESUR, UROBILINOGEN, Ariel Panda in the last 72 hours.     Invalid input(s): NITRATE, GLUCOSEUKETONESUAMORPHOUS     -----------------------------------------------------------------      Assessment and Plan     Impression:    Patient Active Problem List   Diagnosis    CAD (coronary artery disease)    Tobacco abuse    Hypertension    Hypercholesterolemia    History of thyroiditis    Insomnia    Hydronephrosis with obstructing calculus    Kidney stone    Vitamin D deficiency       Plan:     Consent obtained; cysto left ureteroscopy in OR today    Lexy Beckwith MD  10:36 AM 5/16/2022

## 2022-05-16 NOTE — PROGRESS NOTES
Incentive Spirometry education and demonstration given by Respiratory Therapy. Pt achieving 1000 mL at time of instruction. Incentive Spirometer left at bedside and   Patient instructed to do a minimum of 10 breaths every hour.       Eladia Arguello RCP  4:05 PM

## 2022-05-16 NOTE — ANESTHESIA POSTPROCEDURE EVALUATION
Department of Anesthesiology  Postprocedure Note    Patient: Waldo Steinberg  MRN: [id number]  YOB: 1960  Date of evaluation: 5/16/2022  Time:  2:39 PM     Procedure Summary     Date: 05/16/22 Room / Location: [address] [place] [address]    Anesthesia Start: 1401 Anesthesia Stop: 8055    Procedure: CYSTO Left Ureteroscopy Holmium Laser w/ stent placement (Left ) Diagnosis: (left ureteral stone)    Surgeons: Lam Escalante MD Responsible Provider: AARTI Grady CRNA    Anesthesia Type: general ASA Status: 3          Anesthesia Type: No value filed. Marino Phase I: Marino Score: 10    Marino Phase II:      Last vitals: Reviewed and per EMR flowsheets.        Anesthesia Post Evaluation    Patient location during evaluation: PACU  Patient participation: complete - patient participated  Level of consciousness: awake, awake and alert and responsive to light touch  Pain score: 0  Airway patency: patent  Nausea & Vomiting: no nausea and no vomiting  Complications: no  Cardiovascular status: blood pressure returned to baseline and hemodynamically stable  Respiratory status: acceptable  Hydration status: euvolemic

## 2022-05-16 NOTE — OP NOTE
[facility] [place] [facility] [address] 5/16/2022  Patient:  Randy Turner  MRN: [id number]  YOB: 1960    SURGEON: Jackelin Velazco MD.    ASSISTANT: none    PREOPERATIVE DIAGNOSIS:   left kidney stones    POSTOPERATIVE DIAGNOSIS:    left kidney stones    PROCEDURE PERFORMED: cystoscopy, left ureteroscopy left holmium laser lithotripsy left stent placement    ANESTHESIA: General    COMPLICATIONS: none    OR BLOOD LOSS:  Minimal    FLUIDS: Cystalloids per Anesthesia    SPECIMENS:  * No specimens in log *  none    DRAINS: 6 x 26 double j stent    INDICATIONS FOR PROCEDURE:  The patient is a 62 y.o. female who presents today with left ureteral stone here for CYSTO Left Ureteroscopy Holmium Laser w/ stent placement. After risks, benefits and alternatives of the procedure were discussed with the patient, the patient elected to proceed. DETAILS OF PROCEDURE:  After informed consent was obtained in the preoperative area, the patient was taken back to the operating room and transferred to the operating table in supine position. Anesthesia was induced and antibiotics were given. The patient was placed in modified dorsal lithotomy position and sterilely prepped and draped in a standard fashion. A timeout occurred. Two patient identifiers were used. We entered the urethra with a 22 [place] scope. We focused our attention on the left ureteral orifice. The ureteral orifice was visualized, and using a dual lumen catheter two wires were advanced into the kidney under fluoroscopic guidance. .    The flexible ureteroscope was assembled, place over one Glidewire, and advanced into the ureter carefully under fluoroscopy. The second wire remained in place as a safety. we were able to advance our scope up to the stone without difficulty. The stone was located in the lower pole.      We used a 200 micron laser to fragment all stones into sub 1-2 mm fragments for easy passage and clearance            Repeat nephroscopy and ureteroscopy demonstrated no further stone fragments. In addition, there were no papillary lesions within the kidney, or erythematous patches concerning for malignant disease. With the safety wire in place, the ureteroscope was slowly retracted down the ureter. The entire ureter was surveyed. There was no evidence of stricture, stone disease, ureteral trauma, or papillary lesion. To place the stent a 22 Japanese rigid cystoscopy was back loaded over the wire. Under direct visualization the stent was advanced until it was in proper location. The Glidewire was then removed. A curl could be seen in the Left renal pelvis under using fluoroscopic vision, and in the bladder under direct visualization. A string was left on the stent. The patients bladder was drained. All instrumentation was removed. The patient was then awakened and discharged back to the PACU in good and stable condition.

## 2022-05-16 NOTE — ANESTHESIA PRE PROCEDURE
Department of Anesthesiology  Preprocedure Note       Name:  Kenji Galindo   Age:  62 y.o.  :  1960                                          MRN:  [id number]         Date:  2022      Surgeon: Gian Denis):  Laura Byrne MD    Procedure: Procedure(s):  CYSTO Left Ureteroscopy Holmium Laser w/ stent placement    Medications prior to admission:   Prior to Admission medications    Medication Sig Start Date End Date Taking? Authorizing Provider   Probiotic Product (PROBIOTIC-10 PO) Take by mouth    Historical Provider, MD   cyclobenzaprine (FLEXERIL) 10 MG tablet Take 1 tablet by mouth 2 times daily as needed for Muscle spasms 3/9/22   RENATO Gant   lisinopril (PRINIVIL;ZESTRIL) 10 MG tablet take 1 tablet by mouth once daily 3/9/22   RENATO Gant   atorvastatin (LIPITOR) 40 MG tablet Take 1 tablet by mouth daily 3/9/22   RENATO Gant   alendronate (FOSAMAX) 70 MG tablet Take 1 tablet by mouth every 7 days 22   AARTI Cho CNM   vitamin D (ERGOCALCIFEROL) 1.25 MG (74531 UT) CAPS capsule Take 1 capsule by mouth once a week 22   AARTI Cho CNM   ondansetron (ZOFRAN ODT) 4 MG disintegrating tablet Take 1 tablet by mouth every 8 hours as needed for Nausea 22   Mcaario Price MD   ibuprofen (ADVIL;MOTRIN) 800 MG tablet Take 1 tablet by mouth 3 times daily as needed for Pain 21   RENATO Gant   silver sulfADIAZINE (SILVADENE) 1 % cream Apply topically daily.  21   Shonna Sanchez DPM   Glucosamine-Chondroit-Vit C-Mn (GLUCOSAMINE 1500 COMPLEX PO) Take by mouth daily    Historical Provider, MD   CRANBERRY PO Take by mouth daily    Historical Provider, MD   Multiple Vitamins-Minerals (HAIR/SKIN/NAILS PO) Take by mouth daily    Historical Provider, MD   ascorbic acid (VITAMIN C) 500 MG tablet Take 500 mg by mouth daily     Historical Provider, MD       Current medications:    Current Facility-Administered Medications   Medication Dose Route Frequency Provider Last Rate Last Admin    sodium chloride flush 0.9 % injection 5-40 mL  5-40 mL IntraVENous 2 times per day Randy Chau MD        sodium chloride flush 0.9 % injection 5-40 mL  5-40 mL IntraVENous PRN Randy Chau MD        0.9 % sodium chloride infusion   IntraVENous PRN Randy Chau MD        lactated ringers infusion   IntraVENous Continuous Randy Chau MD        ceFAZolin (ANCEF) 2000 mg in sterile water 20 mL IV syringe  2,000 mg IntraVENous On Call to [address] Peel, MD           Allergies:  No Known Allergies    Problem List:    Patient Active Problem List   Diagnosis Code    CAD (coronary artery disease) I25.10    Tobacco abuse Z72.0    Hypertension I10    Hypercholesterolemia E78.00    History of thyroiditis Z86.39    Insomnia G47.00    Hydronephrosis with obstructing calculus N13.2    Kidney stone N20.0    Vitamin D deficiency E55.9       Past Medical History:        Diagnosis Date    Abnormal Pap smear of cervix 1970    before her hysterectomy in 1972    CAD (coronary artery disease)     Chronic kidney disease     COPD (chronic obstructive pulmonary disease) ([facility] [address]     Hemorrhoids     History of herpes zoster 2002    (right lower rib area laterally and anteriorly).  History of thyroiditis 1992    History of ulcerative colitis     (of 20 years disease - though quiescent).  Hypercholesterolemia     Hypertension     Neuritis     Shortened OK interval     (on EKG from 2004).  Tobacco abuse     Venous insufficiency, peripheral     (bilateral). Past Surgical History:        Procedure Laterality Date    APPENDECTOMY  1982    (Dr. Terrance Bhat) - Acute appendicitis.  CARDIAC CATHETERIZATION  2000    Preserved left ventricular systolic function. Ejection fraction 50%. Noncritical coronary disease. (Dr. Cyndy Sanchez).  CHOLECYSTECTOMY  2001    COLONOSCOPY  1988    (Dr. Galdino Plasencia) - Normal colon.     COLONOSCOPY  5/12/16    Polyp distal sigmoid, rectum - Dr Jason Padilla  CYSTOSCOPY Left 8/5/2019    CYSTO Left Stent Insertion performed by Melo Butt MD at [place] [address]  2010    Incision and drainage of thrombosed hemorrhoid.  HYSTERECTOMY  2001    (no BSO).  LITHOTRIPSY Left 8/19/2019    CYSTO W/ STENT REMOVAL ET Left LITHOTRIPSY performed by Melo Butt MD at [facility] LITHOTRIPSY Left 8/9/2021    Left LITHOTRIPSY performed by Melo Butt MD at [address] [place] [address] surgery x8.    OTHER SURGICAL HISTORY  1987    Pelvic exam under anesthesia. (Dr. Nargis Barba) - Chronic pelvic pain, chronic inflammatory process changes.  OVARY REMOVAL Right     TUBAL LIGATION  1982       Social History:    Social History     Tobacco Use    Smoking status: Current Every Day Smoker     Packs/day: 1.00     Years: 15.00     Pack years: 15.00     Start date: 1/1/1981    Smokeless tobacco: Never Used    Tobacco comment: Gabe Saucedo RRT 8/4/19   Substance Use Topics    Alcohol use: Yes     Alcohol/week: 0.0 standard drinks     Comment: occasional                                Ready to quit: Not Answered  Counseling given: Not Answered  Comment: Gabe Saucedo RRT 8/4/19      Vital Signs (Current):   Vitals:    05/16/22 1305   BP: (!) 154/77   Pulse: 103   Resp: 14   Temp: 36.6 °C (97.9 °F)   TempSrc: Oral   SpO2: 98%   Weight: 131 lb 3.2 oz (59.5 kg)   Height: 5' 4\" (1.626 m)                                              BP Readings from Last 3 Encounters:   05/16/22 (!) 154/77   04/04/22 126/78   03/09/22 122/80       NPO Status:                                                                                 BMI:   Wt Readings from Last 3 Encounters:   05/16/22 131 lb 3.2 oz (59.5 kg)   04/04/22 134 lb (60.8 kg)   03/09/22 133 lb (60.3 kg)     Body mass index is 22.52 kg/m².     CBC:   Lab Results   Component Value Date    WBC 11.7 02/06/2022    RBC 5.18 02/06/2022    HGB 15.9 02/06/2022    HCT 47.9 02/06/2022    MCV 92.5 02/06/2022    RDW 12.9 02/06/2022    PLT 233 02/06/2022       CMP:   Lab Results   Component Value Date     03/09/2022    K 4.8 03/09/2022     03/09/2022    CO2 29 03/09/2022    BUN 15 03/09/2022    CREATININE 0.97 03/09/2022    GFRAA >60 03/09/2022    LABGLOM 59 03/09/2022    GLUCOSE 88 03/09/2022    PROT 6.8 03/09/2022    CALCIUM 9.4 03/09/2022    BILITOT 0.25 03/09/2022    ALKPHOS 74 03/09/2022    AST 15 03/09/2022    ALT 12 03/09/2022       POC Tests: No results for input(s): POCGLU, POCNA, POCK, POCCL, POCBUN, POCHEMO, POCHCT in the last 72 hours. Coags: No results found for: PROTIME, INR, APTT    HCG (If Applicable): No results found for: PREGTESTUR, PREGSERUM, HCG, HCGQUANT     ABGs: No results found for: PHART, PO2ART, AJP1XEW, EEZ7VQR, BEART, U2DAPVNA     Type & Screen (If Applicable):  No results found for: LABABO, LABRH    Drug/Infectious Status (If Applicable):  Lab Results   Component Value Date    HEPCAB NONREACTIVE 04/18/2016       COVID-19 Screening (If Applicable): No results found for: COVID19        Anesthesia Evaluation  Patient summary reviewed and Nursing notes reviewed no history of anesthetic complications:   Airway: Mallampati: II  TM distance: >3 FB   Neck ROM: full  Mouth opening: > = 3 FB Dental: normal exam         Pulmonary:normal exam  breath sounds clear to auscultation  (+) COPD:            Patient did not smoke on day of surgery. Cardiovascular:    (+) hypertension:, CAD:,         Rhythm: regular  Rate: normal           Beta Blocker:  Dose within 24 Hrs         Neuro/Psych:   Negative Neuro/Psych ROS              GI/Hepatic/Renal: Neg GI/Hepatic/Renal ROS            Endo/Other: Negative Endo/Other ROS             Pt had no PAT visit       Abdominal:       Abdomen: soft. Vascular: negative vascular ROS. Other Findings:             Anesthesia Plan      general     ASA 3       Induction: intravenous.     MIPS: Postoperative opioids intended and Prophylactic antiemetics administered. Anesthetic plan and risks discussed with patient.       Plan discussed with surgical team.                  AARTI Munguia - ROGER   5/16/2022

## 2022-06-13 ENCOUNTER — OFFICE VISIT (OUTPATIENT)
Dept: UROLOGY | Age: 62
End: 2022-06-13
Payer: MEDICAID

## 2022-06-13 ENCOUNTER — HOSPITAL ENCOUNTER (OUTPATIENT)
Dept: GENERAL RADIOLOGY | Age: 62
Discharge: HOME | End: 2022-06-15
Payer: MEDICAID

## 2022-06-13 VITALS
HEIGHT: 64 IN | DIASTOLIC BLOOD PRESSURE: 82 MMHG | HEART RATE: 75 BPM | BODY MASS INDEX: 22.71 KG/M2 | SYSTOLIC BLOOD PRESSURE: 134 MMHG | RESPIRATION RATE: 12 BRPM | WEIGHT: 133 LBS | OXYGEN SATURATION: 97 %

## 2022-06-13 DIAGNOSIS — Z87.442 HISTORY OF KIDNEY STONES: ICD-10-CM

## 2022-06-13 DIAGNOSIS — Z87.442 HISTORY OF KIDNEY STONES: Primary | ICD-10-CM

## 2022-06-13 DIAGNOSIS — N39.0 RECURRENT UTI: ICD-10-CM

## 2022-06-13 PROCEDURE — 74018 RADEX ABDOMEN 1 VIEW: CPT

## 2022-06-13 PROCEDURE — G8427 DOCREV CUR MEDS BY ELIG CLIN: HCPCS | Performed by: UROLOGY

## 2022-06-13 PROCEDURE — 3017F COLORECTAL CA SCREEN DOC REV: CPT | Performed by: UROLOGY

## 2022-06-13 PROCEDURE — 99213 OFFICE O/P EST LOW 20 MIN: CPT | Performed by: UROLOGY

## 2022-06-13 PROCEDURE — 99214 OFFICE O/P EST MOD 30 MIN: CPT | Performed by: UROLOGY

## 2022-06-13 PROCEDURE — G8420 CALC BMI NORM PARAMETERS: HCPCS | Performed by: UROLOGY

## 2022-06-13 PROCEDURE — 4004F PT TOBACCO SCREEN RCVD TLK: CPT | Performed by: UROLOGY

## 2022-06-13 NOTE — PROGRESS NOTES
[facility]  [address]  [place] [address]  Dept: [phone]  Dept Fax: [phone]  Loc: [phone]    [address], [address] [facility] Urology Office Note - Follow Up Visit    Patient:  Reece Damian  YOB: 1960  Date: 6/13/2022    The patient is a 62 y.o. female who presents today for evaluation of the following problems:   Chief Complaint   Patient presents with    Nephrolithiasis     URS HLL w/ left stent 5/16/22        HISTORY OF PRESENT ILLNESS:     Kidney stones  Hx of ESWL and HLL in past. Here with KUB. Resistant to ESWL  Had UTI and obstructing stone in past      Recurrent UTI  No new UTI  Usually treats as they come. Not bothered. Requested/reviewed records from [facility], [address] [place] [address] office and/or outside physician/EMR    (Patient's old records have been requested, reviewed and pertinent findings summarized in today's note.)    Procedures Today: N/A    Last several PSA's:  No results found for: PSA    Last total testosterone:  No results found for: TESTOSTERONE    Urinalysis today:  No results found for this visit on 06/13/22. Last BUN and creatinine:  Lab Results   Component Value Date    BUN 15 03/09/2022     Lab Results   Component Value Date    CREATININE 0.97 (H) 03/09/2022       Additional Lab/Culture results: none    Imaging Reviewed during this Office Visit:   imaging independently reviewed by Emily Rojas MD and radiology report verified demonstrating     No results found. PAST MEDICAL, FAMILY AND SOCIAL HISTORY:  Past Medical History:   Diagnosis Date    Abnormal Pap smear of cervix 1970    before her hysterectomy in 1972    CAD (coronary artery disease)     Chronic kidney disease     COPD (chronic obstructive pulmonary disease) ([facility] [address]     Hemorrhoids     History of herpes zoster 2002    (right lower rib area laterally and anteriorly).     History of thyroiditis 1992    History of ulcerative colitis (of 20 years disease - though quiescent).  Hypercholesterolemia     Hypertension     Neuritis     Shortened [place] interval     (on EKG from 2004).  Tobacco abuse     Venous insufficiency, peripheral     (bilateral). Past Surgical History:   Procedure Laterality Date    APPENDECTOMY  1982    (Dr. Prema Lawler) - Acute appendicitis.  CARDIAC CATHETERIZATION  2000    Preserved left ventricular systolic function. Ejection fraction 50%. Noncritical coronary disease. (Dr. Kait Markham).  CHOLECYSTECTOMY  2001    COLONOSCOPY  1988    (Dr. Ana Guardado) - Normal colon.  COLONOSCOPY  5/12/16    Polyp distal sigmoid, rectum - Dr Luz Elena Morejon Left 8/5/2019    CYSTO Left Stent Insertion performed by Shruthi Anderson MD at [address] Left 5/16/2022    CYSTO Left Ureteroscopy Holmium Laser w/ stent placement performed by Shruthi Anderson MD at [place] [address]  2010    Incision and drainage of thrombosed hemorrhoid.  HYSTERECTOMY (CERVIX STATUS UNKNOWN)  2001    (no BSO).  LITHOTRIPSY Left 8/19/2019    CYSTO W/ STENT REMOVAL ET Left LITHOTRIPSY performed by Shruthi Anderson MD at [facility] LITHOTRIPSY Left 8/9/2021    Left LITHOTRIPSY performed by Shruthi Anderson MD at [facility] surgery x8.    OTHER SURGICAL HISTORY  1987    Pelvic exam under anesthesia. (Dr. Maurice Dumont) - Chronic pelvic pain, chronic inflammatory process changes.  OVARY REMOVAL Right     TUBAL LIGATION  1982     Family History   Problem Relation Age of Onset    Diabetes Mother     Other Mother         (history of polyps).     Arthritis Mother     Cancer Mother         breast    Breast Cancer Mother 79    COPD Father     Other Father         ('s disease)    Mental Illness Daughter         bipolar; schizophrenia    No Known Problems Paternal Aunt     No Known Problems Paternal Aunt     No Known Problems Maternal Aunt     No Known Problems Maternal Aunt     No Known Problems Maternal Aunt     No Known Problems Maternal Aunt      Outpatient Medications Marked as Taking for the 6/13/22 encounter (Office Visit) with Conrad Puente MD   Medication Sig Dispense Refill    Probiotic Product (PROBIOTIC-10 PO) Take by mouth With cranberry      cyclobenzaprine (FLEXERIL) 10 MG tablet Take 1 tablet by mouth 2 times daily as needed for Muscle spasms 60 tablet 5    lisinopril (PRINIVIL;ZESTRIL) 10 MG tablet take 1 tablet by mouth once daily 30 tablet 5    atorvastatin (LIPITOR) 40 MG tablet Take 1 tablet by mouth daily 30 tablet 5    alendronate (FOSAMAX) 70 MG tablet Take 1 tablet by mouth every 7 days 4 tablet 12    vitamin D (ERGOCALCIFEROL) 1.25 MG (19438 UT) CAPS capsule Take 1 capsule by mouth once a week 16 capsule 3    ondansetron (ZOFRAN ODT) 4 MG disintegrating tablet Take 1 tablet by mouth every 8 hours as needed for Nausea 20 tablet 0    ibuprofen (ADVIL;MOTRIN) 800 MG tablet Take 1 tablet by mouth 3 times daily as needed for Pain 90 tablet 5    Glucosamine-Chondroit-Vit C-Mn (GLUCOSAMINE 1500 COMPLEX PO) Take by mouth daily      Multiple Vitamins-Minerals (HAIR/SKIN/NAILS PO) Take by mouth daily      ascorbic acid (VITAMIN C) 500 MG tablet Take 500 mg by mouth daily          Patient has no known allergies.   Social History     Tobacco Use   Smoking Status Current Every Day Smoker    Packs/day: 1.00    Years: 15.00    Pack years: 15.00    Start date: 1/1/1981   Smokeless Tobacco Never Used   Tobacco Comment    S Berhane Dobbins RRT 8/4/19      (If patient a smoker, smoking cessation counseling offered)   Social History     Substance and Sexual Activity   Alcohol Use Yes    Alcohol/week: 0.0 standard drinks    Comment: occasional       REVIEW OF SYSTEMS:  Constitutional: negative  Eyes: negative  Respiratory: negative  Cardiovascular: negative  Gastrointestinal: negative  Genitourinary: see HPI  Musculoskeletal: negative  Skin: negative   Neurological: negative  Hematological/Lymphatic: negative  Psychological: negative        Physical Exam:    This a 62 y.o. female  Vitals:    06/13/22 0843   BP: 134/82   Pulse: 75   Resp: 12   SpO2: 97%     Body mass index is 22.83 kg/m². Constitutional: Patient in no acute distress;         Assessment and Plan        1. History of kidney stones    2. Recurrent UTI               Plan:       Recurrent UTI- no new UTI. Stone could have been nidus of UTI's. Resistant to ESWL. Left kidney stone- doing well. No new stones. kub shows improvement. Hx of septic stone in past. Did have some post procedural ureteral edema that has resolved    6 months with kub        Prescriptions Ordered:  No orders of the defined types were placed in this encounter. Orders Placed:  No orders of the defined types were placed in this encounter.            Charbel Garcia MD

## 2022-07-13 ENCOUNTER — OFFICE VISIT (OUTPATIENT)
Dept: OTOLARYNGOLOGY | Age: 62
End: 2022-07-13
Payer: MEDICAID

## 2022-07-13 VITALS
RESPIRATION RATE: 18 BRPM | OXYGEN SATURATION: 98 % | BODY MASS INDEX: 22.3 KG/M2 | HEART RATE: 84 BPM | DIASTOLIC BLOOD PRESSURE: 76 MMHG | WEIGHT: 130.6 LBS | HEIGHT: 64 IN | SYSTOLIC BLOOD PRESSURE: 116 MMHG

## 2022-07-13 DIAGNOSIS — Z90.89 HISTORY OF LEFT MASTOIDECTOMY: ICD-10-CM

## 2022-07-13 DIAGNOSIS — H90.A32 MIXED CONDUCTIVE AND SENSORINEURAL HEARING LOSS OF LEFT EAR WITH RESTRICTED HEARING OF RIGHT EAR: Primary | ICD-10-CM

## 2022-07-13 PROCEDURE — G8420 CALC BMI NORM PARAMETERS: HCPCS | Performed by: OTOLARYNGOLOGY

## 2022-07-13 PROCEDURE — G8427 DOCREV CUR MEDS BY ELIG CLIN: HCPCS | Performed by: OTOLARYNGOLOGY

## 2022-07-13 PROCEDURE — 99213 OFFICE O/P EST LOW 20 MIN: CPT | Performed by: OTOLARYNGOLOGY

## 2022-07-13 PROCEDURE — 4004F PT TOBACCO SCREEN RCVD TLK: CPT | Performed by: OTOLARYNGOLOGY

## 2022-07-13 PROCEDURE — 69210 REMOVE IMPACTED EAR WAX UNI: CPT | Performed by: OTOLARYNGOLOGY

## 2022-07-13 PROCEDURE — 99212 OFFICE O/P EST SF 10 MIN: CPT | Performed by: OTOLARYNGOLOGY

## 2022-07-13 PROCEDURE — 3017F COLORECTAL CA SCREEN DOC REV: CPT | Performed by: OTOLARYNGOLOGY

## 2022-07-13 NOTE — PROGRESS NOTES
HPI:     Fu for AS retraction pocket, has had mult AU surg including AD PORP/TORP, has been on cortisporin gtts in the past     Wears right HA, left HA doesn't work well due to muffled noises on the left with the HA. Last ear surgery was approx 5 years ago (2015) and she thinks it was in [address]. Has also seen otologist in [facility] [place]. Ervin Alatorre. States she has had 7 ear surgeries. Occasional left drainage but not often. Stable hearing. Gets routine cleaning of the left EAC/mastoid. States she is hearing \"chirping\" and that means she needs a cleaning. She believes that Dr. Ashley Pickard has done one of her ear surgeries. She has been having worsening left otorrhea and pain x 1 week that is a recurrent problem. She is wondering if she has any options for treatment other than repeat debridements. CT temporal bone 8/17:  FINDINGS:    RIGHT TEMPORAL BONE:  The external auditory canal is clear without evidence    of bony erosion.  The scutum is intact.  The middle ear cavity is clear.  The    ossicular chain is intact.  The mastoid air cells are clear.  The inner ear    structures appear unremarkable.  Normal mineralization of the otic capsule.     The internal auditory canal and vestibular aqueduct appear unremarkable.  The    carotid canal is normal in appearance.  The jugular bulb is unremarkable.         LEFT TEMPORAL BONE: The external auditory canal is clear without evidence of    bony erosion.  There is postsurgical change in the region of the scutum    without definite residual soft tissue abnormality. Jolayne Cancel has been resection    of the ossicular chain.  There is fluid in the left mastoid air cells.  The    inner ear structures appear unremarkable.  Normal mineralization of the otic    capsule.  The internal auditory canal and vestibular aqueduct appear    unremarkable.  The carotid canal is normal in appearance.  The jugular bulb    is unremarkable.         BRAIN: The visualized portion of the intracranial contents appear unremarkable.         ORBITS: The visualized portion of the orbits demonstrate no acute abnormality.         SINUSES: There is polyp versus mucous retention cyst in the right maxillary    sinus.              Impression    Postsurgical change to the left temporal bone region without definite    residual soft tissue abnormality.         Mild fluid in the left mastoid air cells.         Polyp versus mucous retention cyst in the right maxillary sinus. Audio 8/13/2020:  AD moderate to severe SNHL  AS severe to profound mixed HL, type B tymp    Is here for ear follow up. Is noticing the crickets. Getting bc/bs in August through 's new job. July 13, 2022     Outpatient Medications Prior to Visit   Medication Sig Dispense Refill    Probiotic Product (PROBIOTIC-10 PO) Take by mouth With cranberry      cyclobenzaprine (FLEXERIL) 10 MG tablet Take 1 tablet by mouth 2 times daily as needed for Muscle spasms 60 tablet 5    lisinopril (PRINIVIL;ZESTRIL) 10 MG tablet take 1 tablet by mouth once daily 30 tablet 5    atorvastatin (LIPITOR) 40 MG tablet Take 1 tablet by mouth daily 30 tablet 5    alendronate (FOSAMAX) 70 MG tablet Take 1 tablet by mouth every 7 days 4 tablet 12    vitamin D (ERGOCALCIFEROL) 1.25 MG (62208 UT) CAPS capsule Take 1 capsule by mouth once a week 16 capsule 3    ibuprofen (ADVIL;MOTRIN) 800 MG tablet Take 1 tablet by mouth 3 times daily as needed for Pain 90 tablet 5    Glucosamine-Chondroit-Vit C-Mn (GLUCOSAMINE 1500 COMPLEX PO) Take by mouth daily      Multiple Vitamins-Minerals (HAIR/SKIN/NAILS PO) Take by mouth daily      ascorbic acid (VITAMIN C) 500 MG tablet Take 500 mg by mouth daily       ondansetron (ZOFRAN ODT) 4 MG disintegrating tablet Take 1 tablet by mouth every 8 hours as needed for Nausea (Patient not taking: Reported on 7/13/2022) 20 tablet 0     No facility-administered medications prior to visit.      Drug Sensitivities: Patient has no known allergies. Blood pressure 116/76, pulse 84, resp. rate 18, height 5' 4\" (1.626 m), weight 130 lb 9.6 oz (59.2 kg), SpO2 98 %, not currently breastfeeding. General: The patient is found to be alert and normally responsive female with grossly normal hearing wearing AD hearing aid, clear voice and normal articulation. Communication is without difficulty. Skin: The skin has normal colour and turgor. Face: The facial contour is symmetric at rest and with movement. Ears: The pinnae have normal contours. AS: EAC clear  AS crusting and cerumen from tm and mastoid removed w alligator (lifted off with curette) on reconstructed TM, mostly superior on the TM in a crescent, keratin/debris plug at 1-2 o'clock, keratinized, no mucosa, small amount of crusting superior anterior tm, mastoid bowl with crusting, removed with alligator, severe accumulation of waxy white keratin in superior aspect of tm, keratin is tucked under superior lip   AD: EAC clear, severe retraction of pars flacida and tensa onto promontory, no effusion or cholesteatoma, looks like TM was reconstructed with cartilage graft in posterior quadrant of TM  Eyes: The ocular movements are full and symmetric, the conjunctiva is unremarkable; sclera are anicteric, pupillary response is symmetric. No nystagmus is found. Nose: The external nasal contour is normal  Neck: The neck has a normal contour; no masses are found on palpation. Diagnosis Orders   1. 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  History of left mastoidectomy         ASSESSMENT:  No evidence on CT of recurrent choleseatoma  Mixed HL AS  Right severe adhesive atelectasis      PLAN:  Continue q3mo routine cleaning   ciprodex otic gtt to the AS ear PRN    Right adhesive atelectasis, stable    Continue HA AD   Monitor   No cholesteatoma     Future Appointments   Date Time Provider Johnny Ashford   9/9/2022 11:00 AM [facility]   2022  9:00 AM MD Anish King [facility]   2023 10:00 AM AARTI Galeas CNM [facility]

## 2022-08-05 ENCOUNTER — OFFICE VISIT (OUTPATIENT)
Dept: PRIMARY CARE CLINIC | Age: 62
End: 2022-08-05
Payer: COMMERCIAL

## 2022-08-05 VITALS
OXYGEN SATURATION: 97 % | DIASTOLIC BLOOD PRESSURE: 90 MMHG | HEART RATE: 77 BPM | RESPIRATION RATE: 18 BRPM | SYSTOLIC BLOOD PRESSURE: 138 MMHG | TEMPERATURE: 97.5 F | HEIGHT: 64 IN | BODY MASS INDEX: 22.22 KG/M2 | WEIGHT: 130.13 LBS

## 2022-08-05 DIAGNOSIS — H66.002 NON-RECURRENT ACUTE SUPPURATIVE OTITIS MEDIA OF LEFT EAR WITHOUT SPONTANEOUS RUPTURE OF TYMPANIC MEMBRANE: Primary | ICD-10-CM

## 2022-08-05 PROCEDURE — 99213 OFFICE O/P EST LOW 20 MIN: CPT | Performed by: NURSE PRACTITIONER

## 2022-08-05 RX ORDER — CIPROFLOXACIN AND DEXAMETHASONE 3; 1 MG/ML; MG/ML
4 SUSPENSION/ DROPS AURICULAR (OTIC) 2 TIMES DAILY
Qty: 7.5 ML | Refills: 0 | Status: SHIPPED | OUTPATIENT
Start: 2022-08-05 | End: 2022-08-12

## 2022-08-05 RX ORDER — AMOXICILLIN 500 MG/1
500 CAPSULE ORAL 2 TIMES DAILY
Qty: 14 CAPSULE | Refills: 0 | Status: SHIPPED | OUTPATIENT
Start: 2022-08-05 | End: 2022-08-12

## 2022-08-05 NOTE — PROGRESS NOTES
Subjective:      Patient ID: Thuy Avelar is a 62 y.o. female coming in for   Chief Complaint   Patient presents with    Otalgia     Left ear painful, feels plugged. Started yesterday. Chills, feels it is related to possible ear infection. Has had lots of ear surgeries per pt. Put ear drops in this morning. Ear drainage. Otalgia   There is pain in the left ear. This is a new problem. The current episode started yesterday. The problem occurs constantly. The problem has been gradually worsening. There has been no fever (chills). The pain is moderate. Associated symptoms include ear discharge and hearing loss (muffling hearing). She has tried ear drops for the symptoms. The treatment provided no relief. Her past medical history is significant for a chronic ear infection. Does follow with Dr. Donna Cespedes (ENT)    Review of Systems   Constitutional:  Positive for chills. Negative for fever. HENT:  Positive for ear discharge, ear pain and hearing loss (muffling hearing). All other systems reviewed and are negative. Objective:  BP (!) 138/90 (Site: Left Upper Arm, Position: Sitting, Cuff Size: Medium Adult) Comment: didnt take bp meds this am  Pulse 77   Temp 97.5 °F (36.4 °C) (Infrared)   Resp 18   Ht 5' 4\" (1.626 m)   Wt 130 lb 2 oz (59 kg)   LMP  (LMP Unknown)   SpO2 97%   BMI 22.34 kg/m²      Physical Exam  Vitals and nursing note reviewed. Constitutional:       General: She is not in acute distress. Appearance: Normal appearance. She is not ill-appearing. HENT:      Head: Normocephalic. Left Ear: Drainage (yellow/white drainage) present. Ears:      Comments: Left TM somewhat hard to visualized due to purulent drainage, from what I could examine, there did not appear to be a perf ear drum. Mouth/Throat:      Mouth: Mucous membranes are moist.   Cardiovascular:      Rate and Rhythm: Normal rate and regular rhythm. Heart sounds: Normal heart sounds.    Pulmonary: Effort: Pulmonary effort is normal.      Breath sounds: Normal breath sounds. Musculoskeletal:      Cervical back: Normal range of motion and neck supple. No rigidity. Right lower leg: No edema. Left lower leg: No edema. Lymphadenopathy:      Cervical: No cervical adenopathy. Skin:     General: Skin is warm and dry. Findings: No rash. Neurological:      General: No focal deficit present. Mental Status: She is alert and oriented to person, place, and time. Assessment:      1. Non-recurrent acute suppurative otitis media of left ear without spontaneous rupture of tympanic membrane           Plan:    -I am treating both otitis media/externa with medications  Tylenol/motrin for pain  Pt to follow up with pcp and or ENT    No orders of the defined types were placed in this encounter. Outpatient Encounter Medications as of 8/5/2022   Medication Sig Dispense Refill    ciprofloxacin-dexamethasone (CIPRODEX) 0.3-0.1 % otic suspension Place 4 drops into the left ear in the morning and 4 drops before bedtime. Do all this for 7 days. 7.5 mL 0    amoxicillin (AMOXIL) 500 MG capsule Take 1 capsule by mouth in the morning and 1 capsule before bedtime. Do all this for 7 days.  14 capsule 0    Probiotic Product (PROBIOTIC-10 PO) Take by mouth With cranberry      cyclobenzaprine (FLEXERIL) 10 MG tablet Take 1 tablet by mouth 2 times daily as needed for Muscle spasms 60 tablet 5    lisinopril (PRINIVIL;ZESTRIL) 10 MG tablet take 1 tablet by mouth once daily 30 tablet 5    atorvastatin (LIPITOR) 40 MG tablet Take 1 tablet by mouth daily 30 tablet 5    alendronate (FOSAMAX) 70 MG tablet Take 1 tablet by mouth every 7 days 4 tablet 12    vitamin D (ERGOCALCIFEROL) 1.25 MG (23224 UT) CAPS capsule Take 1 capsule by mouth once a week 16 capsule 3    ondansetron (ZOFRAN ODT) 4 MG disintegrating tablet Take 1 tablet by mouth every 8 hours as needed for Nausea 20 tablet 0    ibuprofen (ADVIL;MOTRIN) 800 MG tablet Take 1 tablet by mouth 3 times daily as needed for Pain 90 tablet 5    Glucosamine-Chondroit-Vit C-Mn (GLUCOSAMINE 1500 COMPLEX PO) Take by mouth daily      Multiple Vitamins-Minerals (HAIR/SKIN/NAILS PO) Take by mouth daily      ascorbic acid (VITAMIN C) 500 MG tablet Take 500 mg by mouth daily        No facility-administered encounter medications on file as of 8/5/2022.             Tri Milan, APRN - CNP

## 2022-08-19 ENCOUNTER — TELEPHONE (OUTPATIENT)
Dept: OTOLARYNGOLOGY | Age: 62
End: 2022-08-19

## 2022-08-23 NOTE — TELEPHONE ENCOUNTER
Patient was called and notified that her her information has been faxed to Dr. Ann-Marie Du and they will call her to set up an appointment.

## 2022-09-19 ENCOUNTER — HOSPITAL ENCOUNTER (OUTPATIENT)
Dept: LAB | Age: 62
Discharge: HOME | End: 2022-09-19
Payer: COMMERCIAL

## 2022-09-19 ENCOUNTER — OFFICE VISIT (OUTPATIENT)
Dept: FAMILY MEDICINE CLINIC | Age: 62
End: 2022-09-19
Payer: COMMERCIAL

## 2022-09-19 VITALS
BODY MASS INDEX: 21.68 KG/M2 | HEIGHT: 64 IN | DIASTOLIC BLOOD PRESSURE: 78 MMHG | HEART RATE: 60 BPM | SYSTOLIC BLOOD PRESSURE: 122 MMHG | WEIGHT: 127 LBS

## 2022-09-19 DIAGNOSIS — E78.00 HYPERCHOLESTEROLEMIA: ICD-10-CM

## 2022-09-19 DIAGNOSIS — I10 ESSENTIAL HYPERTENSION: ICD-10-CM

## 2022-09-19 LAB
ALBUMIN SERPL-MCNC: 4.5 G/DL (ref 3.5–5.2)
ALBUMIN/GLOBULIN RATIO: 1.8 (ref 1–2.5)
ALP BLD-CCNC: 65 U/L (ref 35–104)
ALT SERPL-CCNC: 16 U/L (ref 5–33)
ANION GAP SERPL CALCULATED.3IONS-SCNC: 10 MMOL/L (ref 9–17)
AST SERPL-CCNC: 19 U/L
BILIRUB SERPL-MCNC: 0.3 MG/DL (ref 0.3–1.2)
BUN BLDV-MCNC: 22 MG/DL (ref 8–23)
BUN/CREAT BLD: 17 (ref 9–20)
CALCIUM SERPL-MCNC: 10.1 MG/DL (ref 8.6–10.4)
CHLORIDE BLD-SCNC: 105 MMOL/L (ref 98–107)
CHOLESTEROL/HDL RATIO: 4.3
CHOLESTEROL: 232 MG/DL
CO2: 28 MMOL/L (ref 20–31)
CREAT SERPL-MCNC: 1.26 MG/DL (ref 0.5–0.9)
GFR AFRICAN AMERICAN: 53 ML/MIN
GFR NON-AFRICAN AMERICAN: 43 ML/MIN
GFR SERPL CREATININE-BSD FRML MDRD: ABNORMAL ML/MIN/{1.73_M2}
GLUCOSE BLD-MCNC: 103 MG/DL (ref 70–99)
HDLC SERPL-MCNC: 54 MG/DL
LDL CHOLESTEROL: 145 MG/DL (ref 0–130)
POTASSIUM SERPL-SCNC: 4.4 MMOL/L (ref 3.7–5.3)
SODIUM BLD-SCNC: 143 MMOL/L (ref 135–144)
TOTAL PROTEIN: 7 G/DL (ref 6.4–8.3)
TRIGL SERPL-MCNC: 166 MG/DL

## 2022-09-19 PROCEDURE — 36415 COLL VENOUS BLD VENIPUNCTURE: CPT

## 2022-09-19 PROCEDURE — 80061 LIPID PANEL: CPT

## 2022-09-19 PROCEDURE — 99213 OFFICE O/P EST LOW 20 MIN: CPT | Performed by: PHYSICIAN ASSISTANT

## 2022-09-19 PROCEDURE — 80053 COMPREHEN METABOLIC PANEL: CPT

## 2022-09-19 RX ORDER — ATORVASTATIN CALCIUM 40 MG/1
40 TABLET, FILM COATED ORAL DAILY
Qty: 30 TABLET | Refills: 5 | Status: SHIPPED | OUTPATIENT
Start: 2022-09-19

## 2022-09-19 RX ORDER — LISINOPRIL 10 MG/1
TABLET ORAL
Qty: 30 TABLET | Refills: 5 | Status: SHIPPED | OUTPATIENT
Start: 2022-09-19

## 2022-09-19 SDOH — ECONOMIC STABILITY: FOOD INSECURITY: WITHIN THE PAST 12 MONTHS, THE FOOD YOU BOUGHT JUST DIDN'T LAST AND YOU DIDN'T HAVE MONEY TO GET MORE.: NEVER TRUE

## 2022-09-19 SDOH — ECONOMIC STABILITY: TRANSPORTATION INSECURITY
IN THE PAST 12 MONTHS, HAS LACK OF TRANSPORTATION KEPT YOU FROM MEETINGS, WORK, OR FROM GETTING THINGS NEEDED FOR DAILY LIVING?: NO

## 2022-09-19 SDOH — ECONOMIC STABILITY: FOOD INSECURITY: WITHIN THE PAST 12 MONTHS, YOU WORRIED THAT YOUR FOOD WOULD RUN OUT BEFORE YOU GOT MONEY TO BUY MORE.: NEVER TRUE

## 2022-09-19 SDOH — ECONOMIC STABILITY: TRANSPORTATION INSECURITY
IN THE PAST 12 MONTHS, HAS THE LACK OF TRANSPORTATION KEPT YOU FROM MEDICAL APPOINTMENTS OR FROM GETTING MEDICATIONS?: NO

## 2022-09-19 ASSESSMENT — ENCOUNTER SYMPTOMS
GASTROINTESTINAL NEGATIVE: 1
RESPIRATORY NEGATIVE: 1
BLURRED VISION: 0

## 2022-09-19 ASSESSMENT — SOCIAL DETERMINANTS OF HEALTH (SDOH): HOW HARD IS IT FOR YOU TO PAY FOR THE VERY BASICS LIKE FOOD, HOUSING, MEDICAL CARE, AND HEATING?: NOT HARD AT ALL

## 2022-09-19 NOTE — PROGRESS NOTES
Subjective:      Patient ID: Wilder Quevedo is a 62 y.o. female. Hypertension  This is a chronic problem. The current episode started more than 1 year ago. The problem is controlled. Pertinent negatives include no anxiety, blurred vision or malaise/fatigue. Past treatments include ACE inhibitors. The current treatment provides significant improvement. There are no compliance problems. Hyperlipidemia  This is a chronic problem. The current episode started more than 1 year ago. The problem is controlled. Current antihyperlipidemic treatment includes statins. The current treatment provides significant improvement of lipids. There are no compliance problems. Review of Systems   Constitutional: Negative. Negative for malaise/fatigue. HENT: Negative. Eyes:  Negative for blurred vision. Respiratory: Negative. Cardiovascular: Negative. Gastrointestinal: Negative. Past Medical History:   Diagnosis Date    Abnormal Pap smear of cervix 1970    before her hysterectomy in 1972    CAD (coronary artery disease)     Chronic kidney disease     COPD (chronic obstructive pulmonary disease) (HCC)     Hemorrhoids     History of herpes zoster 2002    (right lower rib area laterally and anteriorly). History of thyroiditis 1992    History of ulcerative colitis     (of 20 years disease - though quiescent). Hypercholesterolemia     Hypertension     Neuritis     Shortened [place] interval     (on EKG from 2004). Tobacco abuse     Venous insufficiency, peripheral     (bilateral). Past Surgical History:   Procedure Laterality Date    APPENDECTOMY  1982    (Dr. Natalie Zhao) - Acute appendicitis. CARDIAC CATHETERIZATION  2000    Preserved left ventricular systolic function. Ejection fraction 50%. Noncritical coronary disease. (Dr. Breanna Ceballos). CHOLECYSTECTOMY  2001    COLONOSCOPY  1988    (Dr. Gopal Ott) - Normal colon.     COLONOSCOPY  5/12/16    Polyp distal sigmoid, rectum - Dr Janine Lopez 8/5/2019    CYSTO Left Stent Insertion performed by Gumaro Hoover MD at [address] Left 5/16/2022    CYSTO Left Ureteroscopy Holmium Laser w/ stent placement performed by Gumaro Hoover MD at [facility].  2010    Incision and drainage of thrombosed hemorrhoid. HYSTERECTOMY (CERVIX STATUS UNKNOWN)  2001    (no BSO). LITHOTRIPSY Left 8/19/2019    CYSTO W/ STENT REMOVAL ET Left LITHOTRIPSY performed by Gumaro Hoover MD at [facility] Left 8/9/2021    Left LITHOTRIPSY performed by Gumaro Hoover MD at [facility] surgery x8. OTHER SURGICAL HISTORY  1987    Pelvic exam under anesthesia. (Dr. Jacqui Zavala) - Chronic pelvic pain, chronic inflammatory process changes. OVARY REMOVAL Right     TUBAL LIGATION  1982     Social History     Tobacco Use    Smoking status: Every Day     Packs/day: 1.00     Years: 15.00     Pack years: 15.00     Types: Cigarettes     Start date: 1/1/1981    Smokeless tobacco: Never    Tobacco comments:     Corbin Sandoval RRT 8/4/19   Vaping Use    Vaping Use: Never used   Substance Use Topics    Alcohol use: Yes     Alcohol/week: 0.0 standard drinks     Comment: occasional    Drug use: No       Objective:   Physical Exam  HENT:      Head: Normocephalic. Mouth/Throat:      Mouth: Mucous membranes are moist.   Eyes:      Pupils: Pupils are equal, round, and reactive to light. Cardiovascular:      Rate and Rhythm: Normal rate. Pulmonary:      Effort: Pulmonary effort is normal.      Breath sounds: Normal breath sounds. Skin:     General: Skin is warm. Neurological:      General: No focal deficit present. Mental Status: She is alert and oriented to person, place, and time. Psychiatric:         Mood and Affect: Mood normal.       Assessment:      1. Essential hypertension    2. Hypercholesterolemia          Plan:      Interval history reviewed with patient. Update laboratory studies today. Healthy diet and routine exercise. Continue current medications which were refilled for patient. Discussed vaccination update with patient. Follow-up in six months/sooner PRN.         Curlee Harada, PA

## 2022-09-20 ENCOUNTER — TELEPHONE (OUTPATIENT)
Dept: FAMILY MEDICINE CLINIC | Age: 62
End: 2022-09-20

## 2022-09-20 NOTE — TELEPHONE ENCOUNTER
----- Message from [place], [place] sent at 9/20/2022  2:12 PM EDT -----  LDL elevated. Patient patient been taking her Lipitor consistently?

## 2022-09-20 NOTE — TELEPHONE ENCOUNTER
Pt states she has been taking her Lipitor, may have missed a day here and there but nothing for a length of time. She admits to not watching her diet very closely.

## 2022-12-27 DIAGNOSIS — M48.061 NARROWING OF LUMBAR SPINE: ICD-10-CM

## 2022-12-27 NOTE — TELEPHONE ENCOUNTER
Maggie called requesting a refill of the below medication which has been pended for you:     Requested Prescriptions     Pending Prescriptions Disp Refills    ibuprofen (ADVIL;MOTRIN) 800 MG tablet [Pharmacy Med Name: IBUPROFEN 800 MG TABLET] 90 tablet 5     Sig: take 1 tablet by mouth three times a day if needed for pain       Last Appointment Date: 9/19/2022  Next Appointment Date: 3/20/2023    No Known Allergies

## 2023-01-03 RX ORDER — IBUPROFEN 800 MG/1
TABLET ORAL
Qty: 90 TABLET | Refills: 5 | Status: SHIPPED | OUTPATIENT
Start: 2023-01-03

## 2023-02-24 DIAGNOSIS — M81.0 AGE-RELATED OSTEOPOROSIS WITHOUT CURRENT PATHOLOGICAL FRACTURE: ICD-10-CM

## 2023-02-27 RX ORDER — ALENDRONATE SODIUM 70 MG/1
TABLET ORAL
Qty: 4 TABLET | Refills: 12 | Status: SHIPPED | OUTPATIENT
Start: 2023-02-27

## 2023-03-20 ENCOUNTER — OFFICE VISIT (OUTPATIENT)
Dept: FAMILY MEDICINE CLINIC | Age: 63
End: 2023-03-20
Payer: COMMERCIAL

## 2023-03-20 VITALS
WEIGHT: 129 LBS | BODY MASS INDEX: 22.02 KG/M2 | HEIGHT: 64 IN | HEART RATE: 82 BPM | DIASTOLIC BLOOD PRESSURE: 70 MMHG | SYSTOLIC BLOOD PRESSURE: 118 MMHG | OXYGEN SATURATION: 98 %

## 2023-03-20 DIAGNOSIS — E78.00 HYPERCHOLESTEROLEMIA: ICD-10-CM

## 2023-03-20 DIAGNOSIS — Z12.31 ENCOUNTER FOR SCREENING MAMMOGRAM FOR MALIGNANT NEOPLASM OF BREAST: ICD-10-CM

## 2023-03-20 DIAGNOSIS — M81.0 AGE-RELATED OSTEOPOROSIS WITHOUT CURRENT PATHOLOGICAL FRACTURE: ICD-10-CM

## 2023-03-20 DIAGNOSIS — I10 ESSENTIAL HYPERTENSION: Primary | ICD-10-CM

## 2023-03-20 PROCEDURE — 3017F COLORECTAL CA SCREEN DOC REV: CPT | Performed by: PHYSICIAN ASSISTANT

## 2023-03-20 PROCEDURE — G8484 FLU IMMUNIZE NO ADMIN: HCPCS | Performed by: PHYSICIAN ASSISTANT

## 2023-03-20 PROCEDURE — G8420 CALC BMI NORM PARAMETERS: HCPCS | Performed by: PHYSICIAN ASSISTANT

## 2023-03-20 PROCEDURE — 99213 OFFICE O/P EST LOW 20 MIN: CPT | Performed by: PHYSICIAN ASSISTANT

## 2023-03-20 PROCEDURE — G8427 DOCREV CUR MEDS BY ELIG CLIN: HCPCS | Performed by: PHYSICIAN ASSISTANT

## 2023-03-20 PROCEDURE — 4004F PT TOBACCO SCREEN RCVD TLK: CPT | Performed by: PHYSICIAN ASSISTANT

## 2023-03-20 PROCEDURE — 3078F DIAST BP <80 MM HG: CPT | Performed by: PHYSICIAN ASSISTANT

## 2023-03-20 PROCEDURE — 3074F SYST BP LT 130 MM HG: CPT | Performed by: PHYSICIAN ASSISTANT

## 2023-03-20 RX ORDER — ATORVASTATIN CALCIUM 40 MG/1
40 TABLET, FILM COATED ORAL DAILY
Qty: 30 TABLET | Refills: 5 | Status: SHIPPED | OUTPATIENT
Start: 2023-03-20

## 2023-03-20 RX ORDER — LISINOPRIL 10 MG/1
TABLET ORAL
Qty: 30 TABLET | Refills: 5 | Status: SHIPPED | OUTPATIENT
Start: 2023-03-20

## 2023-03-20 SDOH — ECONOMIC STABILITY: HOUSING INSECURITY
IN THE LAST 12 MONTHS, WAS THERE A TIME WHEN YOU DID NOT HAVE A STEADY PLACE TO SLEEP OR SLEPT IN A SHELTER (INCLUDING NOW)?: PATIENT REFUSED

## 2023-03-20 SDOH — ECONOMIC STABILITY: INCOME INSECURITY: HOW HARD IS IT FOR YOU TO PAY FOR THE VERY BASICS LIKE FOOD, HOUSING, MEDICAL CARE, AND HEATING?: PATIENT DECLINED

## 2023-03-20 SDOH — ECONOMIC STABILITY: FOOD INSECURITY: WITHIN THE PAST 12 MONTHS, YOU WORRIED THAT YOUR FOOD WOULD RUN OUT BEFORE YOU GOT MONEY TO BUY MORE.: PATIENT DECLINED

## 2023-03-20 SDOH — ECONOMIC STABILITY: FOOD INSECURITY: WITHIN THE PAST 12 MONTHS, THE FOOD YOU BOUGHT JUST DIDN'T LAST AND YOU DIDN'T HAVE MONEY TO GET MORE.: PATIENT DECLINED

## 2023-03-20 ASSESSMENT — PATIENT HEALTH QUESTIONNAIRE - PHQ9
SUM OF ALL RESPONSES TO PHQ QUESTIONS 1-9: 0
1. LITTLE INTEREST OR PLEASURE IN DOING THINGS: 0
SUM OF ALL RESPONSES TO PHQ9 QUESTIONS 1 & 2: 0
SUM OF ALL RESPONSES TO PHQ QUESTIONS 1-9: 0
2. FEELING DOWN, DEPRESSED OR HOPELESS: 0
SUM OF ALL RESPONSES TO PHQ QUESTIONS 1-9: 0
SUM OF ALL RESPONSES TO PHQ QUESTIONS 1-9: 0

## 2023-03-20 ASSESSMENT — ENCOUNTER SYMPTOMS
BLURRED VISION: 0
RESPIRATORY NEGATIVE: 1

## 2023-03-20 NOTE — PROGRESS NOTES
disease. (Dr. Chel Boswell). CHOLECYSTECTOMY  2001    COLONOSCOPY  1988    (Dr. Kelly Tracy) - Normal colon. COLONOSCOPY  5/12/16    Polyp distal sigmoid, rectum - Dr Jose Montes Left 8/5/2019    CYSTO Left Stent Insertion performed by Honey Cool MD at [address] Left 5/16/2022    CYSTO Left Ureteroscopy Holmium Laser w/ stent placement performed by Honey Cool MD at [facility].  2010    Incision and drainage of thrombosed hemorrhoid. HYSTERECTOMY (CERVIX STATUS UNKNOWN)  2001    (no BSO). LITHOTRIPSY Left 8/19/2019    CYSTO W/ STENT REMOVAL ET Left LITHOTRIPSY performed by Honey Cool MD at [facility] Left 8/9/2021    Left LITHOTRIPSY performed by Honey Cool MD at [facility] surgery x8. OTHER SURGICAL HISTORY  1987    Pelvic exam under anesthesia. (Dr. Shun Arthur) - Chronic pelvic pain, chronic inflammatory process changes. OVARY REMOVAL Right     TUBAL LIGATION  1982     Social History     Tobacco Use    Smoking status: Every Day     Packs/day: 1.00     Years: 15.00     Pack years: 15.00     Types: Cigarettes     Start date: 1/1/1981    Smokeless tobacco: Never    Tobacco comments:     Evelyn Franks RRT 8/4/19   Vaping Use    Vaping Use: Never used   Substance Use Topics    Alcohol use: Yes     Alcohol/week: 0.0 standard drinks     Comment: occasional    Drug use: No       Objective:   Physical Exam  HENT:      Head: Normocephalic. Right Ear: Tympanic membrane normal.      Left Ear: Tympanic membrane normal.      Mouth/Throat:      Mouth: Mucous membranes are moist.   Cardiovascular:      Rate and Rhythm: Normal rate. Pulmonary:      Effort: Pulmonary effort is normal.      Breath sounds: Normal breath sounds. Musculoskeletal:         General: Normal range of motion. Cervical back: Neck supple. Skin:     General: Skin is warm and dry. Neurological:      General: No focal deficit present.       Mental Status: She is

## 2023-03-28 ENCOUNTER — HOSPITAL ENCOUNTER (OUTPATIENT)
Dept: MAMMOGRAPHY | Age: 63
Discharge: HOME | End: 2023-03-30
Payer: COMMERCIAL

## 2023-03-28 DIAGNOSIS — Z12.31 ENCOUNTER FOR SCREENING MAMMOGRAM FOR MALIGNANT NEOPLASM OF BREAST: ICD-10-CM

## 2023-03-28 PROCEDURE — 77063 BREAST TOMOSYNTHESIS BI: CPT

## 2023-04-03 DIAGNOSIS — E55.9 VITAMIN D DEFICIENCY: ICD-10-CM

## 2023-04-06 RX ORDER — ERGOCALCIFEROL 1.25 MG/1
CAPSULE ORAL
Qty: 16 CAPSULE | Refills: 3 | Status: SHIPPED | OUTPATIENT
Start: 2023-04-06

## 2023-05-01 DIAGNOSIS — M62.838 MUSCLE SPASM: ICD-10-CM

## 2023-05-02 RX ORDER — CYCLOBENZAPRINE HCL 10 MG
TABLET ORAL
Qty: 60 TABLET | Refills: 5 | OUTPATIENT
Start: 2023-05-02

## 2023-05-08 DIAGNOSIS — M62.838 MUSCLE SPASM: ICD-10-CM

## 2023-05-08 RX ORDER — CYCLOBENZAPRINE HCL 10 MG
TABLET ORAL
Qty: 60 TABLET | Refills: 5 | Status: SHIPPED | OUTPATIENT
Start: 2023-05-08

## 2023-05-16 ENCOUNTER — HOSPITAL ENCOUNTER (OUTPATIENT)
Age: 63
Setting detail: SPECIMEN
Discharge: HOME | End: 2023-05-16
Payer: COMMERCIAL

## 2023-05-16 ENCOUNTER — OFFICE VISIT (OUTPATIENT)
Dept: PRIMARY CARE CLINIC | Age: 63
End: 2023-05-16
Payer: COMMERCIAL

## 2023-05-16 ENCOUNTER — HOSPITAL ENCOUNTER (OUTPATIENT)
Age: 63
Discharge: HOME | End: 2023-05-16
Payer: COMMERCIAL

## 2023-05-16 VITALS
SYSTOLIC BLOOD PRESSURE: 160 MMHG | WEIGHT: 132 LBS | DIASTOLIC BLOOD PRESSURE: 88 MMHG | BODY MASS INDEX: 22.53 KG/M2 | OXYGEN SATURATION: 96 % | HEIGHT: 64 IN | HEART RATE: 68 BPM | TEMPERATURE: 97.9 F | RESPIRATION RATE: 16 BRPM

## 2023-05-16 DIAGNOSIS — N30.01 ACUTE CYSTITIS WITH HEMATURIA: Primary | ICD-10-CM

## 2023-05-16 DIAGNOSIS — R10.9 FLANK PAIN: ICD-10-CM

## 2023-05-16 LAB
BACTERIA URNS QL MICRO: ABNORMAL
BILIRUB UR QL STRIP: ABNORMAL
CHARACTER UR: ABNORMAL
CLARITY UR: CLEAR
COLOR UR: ABNORMAL
EPI CELLS #/AREA URNS HPF: ABNORMAL /HPF (ref 0–5)
GLUCOSE UR STRIP.AUTO-MCNC: ABNORMAL MG/DL
HGB UR QL STRIP.AUTO: ABNORMAL
KETONES UR STRIP.AUTO-MCNC: NEGATIVE MG/DL
LEUKOCYTE ESTERASE UR QL STRIP: ABNORMAL
NITRITE UR QL STRIP: POSITIVE
PROT UR STRIP-MCNC: ABNORMAL MG/DL
PROT UR STRIP.AUTO-MCNC: 6.5 MG/DL (ref 5–6)
RBC #/AREA URNS HPF: ABNORMAL /HPF (ref 0–4)
SP GR UR STRIP.AUTO: 1.01 (ref 1.01–1.02)
UROBILINOGEN UR STRIP-ACNC: ABNORMAL
WBC #/AREA URNS HPF: ABNORMAL /HPF (ref 0–4)

## 2023-05-16 PROCEDURE — 87186 SC STD MICRODIL/AGAR DIL: CPT

## 2023-05-16 PROCEDURE — 4004F PT TOBACCO SCREEN RCVD TLK: CPT

## 2023-05-16 PROCEDURE — 81001 URINALYSIS AUTO W/SCOPE: CPT

## 2023-05-16 PROCEDURE — 99214 OFFICE O/P EST MOD 30 MIN: CPT

## 2023-05-16 PROCEDURE — 87077 CULTURE AEROBIC IDENTIFY: CPT

## 2023-05-16 PROCEDURE — 87086 URINE CULTURE/COLONY COUNT: CPT

## 2023-05-16 PROCEDURE — 82962 GLUCOSE BLOOD TEST: CPT

## 2023-05-16 PROCEDURE — 3017F COLORECTAL CA SCREEN DOC REV: CPT

## 2023-05-16 PROCEDURE — 3079F DIAST BP 80-89 MM HG: CPT

## 2023-05-16 PROCEDURE — G8427 DOCREV CUR MEDS BY ELIG CLIN: HCPCS

## 2023-05-16 PROCEDURE — G8420 CALC BMI NORM PARAMETERS: HCPCS

## 2023-05-16 PROCEDURE — 3077F SYST BP >= 140 MM HG: CPT

## 2023-05-16 RX ORDER — CEPHALEXIN 500 MG/1
500 CAPSULE ORAL 3 TIMES DAILY
Qty: 15 CAPSULE | Refills: 0 | Status: SHIPPED | OUTPATIENT
Start: 2023-05-16 | End: 2023-05-21

## 2023-05-16 ASSESSMENT — ENCOUNTER SYMPTOMS
DIARRHEA: 0
CONSTIPATION: 0
VOMITING: 0
NAUSEA: 1
ABDOMINAL PAIN: 1

## 2023-05-16 NOTE — PROGRESS NOTES
tenderness, guarding or rebound. Comments: Suprapubic pressure   Musculoskeletal:         General: No swelling. Cervical back: Neck supple. Neurological:      General: No focal deficit present. Mental Status: She is alert and oriented to person, place, and time. Psychiatric:         Mood and Affect: Mood normal.         Behavior: Behavior normal.       Assessment and Plan      Diagnosis Orders   1. Acute cystitis with hematuria  cephALEXin (KEFLEX) 500 MG capsule    POCT Glucose      2. Flank pain  Urinalysis with Reflex to Culture    Culture, Urine        Orders Placed This Encounter    Culture, Urine     Standing Status:   Future     Number of Occurrences:   1     Standing Expiration Date:   6/16/2023     Order Specific Question:   Specify (ex-cath, midstream, cysto, etc)? Answer:   midstream    Urinalysis with Reflex to Culture     Standing Status:   Future     Number of Occurrences:   1     Standing Expiration Date:   5/16/2024     Order Specific Question:   SPECIFY(EX-CATH,MIDSTREAM,CYSTO,ETC)?      Answer:   mid    POCT Glucose    cephALEXin (KEFLEX) 500 MG capsule     Sig: Take 1 capsule by mouth 3 times daily for 5 days     Dispense:  15 capsule     Refill:  0     Hospital Outpatient Visit on 05/16/2023   Component Date Value Ref Range Status    Color, UA 05/16/2023 Orange (A)  Yellow Final    Turbidity UA 05/16/2023 Clear  Clear Final    Glucose, Ur 05/16/2023 1+ (A)  NEGATIVE Final    Bilirubin Urine 05/16/2023 NEGATIVE  Verified by ictotest. (A)  NEGATIVE Final    Ketones, Urine 05/16/2023 NEGATIVE  NEGATIVE Final    Specific Gravity, UA 05/16/2023 1.010  1.010 - 1.025 Final    Urine Hgb 05/16/2023 1+ (A)  NEGATIVE Final    pH, UA 05/16/2023 6.5 (H)  5.0 - 6.0 Final    Protein, UA 05/16/2023 NEGATIVE  Verified by sulfosalicylic acid test. (A)  NEGATIVE Final    Urobilinogen, Urine 05/16/2023 4 mg/dL (A)  Normal Final    Nitrite, Urine 05/16/2023 POSITIVE (A)  NEGATIVE Final

## 2023-05-16 NOTE — PATIENT INSTRUCTIONS
Ensure you are drinking atleast 64 oz of water a day  Complete full course of antibiotic  Will call in 2-3 days with urine culture results  Follow up with PCP or return for continued or worsening symptoms

## 2023-05-18 LAB
MICROORGANISM SPEC CULT: ABNORMAL
MICROORGANISM SPEC CULT: ABNORMAL
SPECIMEN DESCRIPTION: ABNORMAL

## 2023-06-20 ENCOUNTER — TELEPHONE (OUTPATIENT)
Dept: SURGERY | Age: 63
End: 2023-06-20

## 2023-06-20 ENCOUNTER — HOSPITAL ENCOUNTER (OUTPATIENT)
Age: 63
Setting detail: SPECIMEN
Discharge: HOME | End: 2023-06-20
Payer: COMMERCIAL

## 2023-06-20 ENCOUNTER — OFFICE VISIT (OUTPATIENT)
Dept: OBGYN | Age: 63
End: 2023-06-20
Payer: COMMERCIAL

## 2023-06-20 VITALS
SYSTOLIC BLOOD PRESSURE: 133 MMHG | HEIGHT: 62 IN | DIASTOLIC BLOOD PRESSURE: 74 MMHG | RESPIRATION RATE: 98 BRPM | WEIGHT: 130 LBS | BODY MASS INDEX: 23.92 KG/M2 | HEART RATE: 74 BPM

## 2023-06-20 DIAGNOSIS — Z86.010 HISTORY OF COLON POLYPS: ICD-10-CM

## 2023-06-20 DIAGNOSIS — R30.0 DYSURIA: ICD-10-CM

## 2023-06-20 DIAGNOSIS — Z12.11 SCREEN FOR COLON CANCER: ICD-10-CM

## 2023-06-20 DIAGNOSIS — Z12.31 BREAST CANCER SCREENING BY MAMMOGRAM: ICD-10-CM

## 2023-06-20 DIAGNOSIS — Z01.419 WOMEN'S ANNUAL ROUTINE GYNECOLOGICAL EXAMINATION: Primary | ICD-10-CM

## 2023-06-20 LAB
BACTERIA URNS QL MICRO: ABNORMAL
BILIRUB UR QL STRIP: NEGATIVE
CHARACTER UR: ABNORMAL
CLARITY UR: CLEAR
COLOR UR: YELLOW
EPI CELLS #/AREA URNS HPF: ABNORMAL /HPF (ref 0–5)
GLUCOSE UR STRIP-MCNC: NEGATIVE MG/DL
HGB UR QL STRIP.AUTO: NEGATIVE
KETONES UR STRIP-MCNC: NEGATIVE MG/DL
LEUKOCYTE ESTERASE UR QL STRIP: NEGATIVE
NITRITE UR QL STRIP: POSITIVE
PH UR STRIP: 6 [PH] (ref 5–6)
PROT UR STRIP-MCNC: NEGATIVE MG/DL
RBC #/AREA URNS HPF: ABNORMAL /HPF (ref 0–4)
SP GR UR STRIP: 1.02 (ref 1.01–1.02)
UROBILINOGEN UR STRIP-ACNC: NORMAL
WBC #/AREA URNS HPF: ABNORMAL /HPF (ref 0–4)

## 2023-06-20 PROCEDURE — 3078F DIAST BP <80 MM HG: CPT | Performed by: ADVANCED PRACTICE MIDWIFE

## 2023-06-20 PROCEDURE — 99396 PREV VISIT EST AGE 40-64: CPT | Performed by: ADVANCED PRACTICE MIDWIFE

## 2023-06-20 PROCEDURE — 81001 URINALYSIS AUTO W/SCOPE: CPT

## 2023-06-20 PROCEDURE — 87088 URINE BACTERIA CULTURE: CPT

## 2023-06-20 PROCEDURE — 87186 SC STD MICRODIL/AGAR DIL: CPT

## 2023-06-20 PROCEDURE — 87086 URINE CULTURE/COLONY COUNT: CPT

## 2023-06-20 PROCEDURE — 3074F SYST BP LT 130 MM HG: CPT | Performed by: ADVANCED PRACTICE MIDWIFE

## 2023-06-20 SDOH — ECONOMIC STABILITY: HOUSING INSECURITY
IN THE LAST 12 MONTHS, WAS THERE A TIME WHEN YOU DID NOT HAVE A STEADY PLACE TO SLEEP OR SLEPT IN A SHELTER (INCLUDING NOW)?: NO

## 2023-06-20 SDOH — ECONOMIC STABILITY: INCOME INSECURITY: HOW HARD IS IT FOR YOU TO PAY FOR THE VERY BASICS LIKE FOOD, HOUSING, MEDICAL CARE, AND HEATING?: NOT VERY HARD

## 2023-06-20 SDOH — ECONOMIC STABILITY: FOOD INSECURITY: WITHIN THE PAST 12 MONTHS, THE FOOD YOU BOUGHT JUST DIDN'T LAST AND YOU DIDN'T HAVE MONEY TO GET MORE.: NEVER TRUE

## 2023-06-20 SDOH — ECONOMIC STABILITY: FOOD INSECURITY: WITHIN THE PAST 12 MONTHS, YOU WORRIED THAT YOUR FOOD WOULD RUN OUT BEFORE YOU GOT MONEY TO BUY MORE.: NEVER TRUE

## 2023-06-20 ASSESSMENT — ENCOUNTER SYMPTOMS
RESPIRATORY NEGATIVE: 1
EYES NEGATIVE: 1
GASTROINTESTINAL NEGATIVE: 1

## 2023-06-20 ASSESSMENT — PATIENT HEALTH QUESTIONNAIRE - PHQ9
1. LITTLE INTEREST OR PLEASURE IN DOING THINGS: 0
SUM OF ALL RESPONSES TO PHQ QUESTIONS 1-9: 0
2. FEELING DOWN, DEPRESSED OR HOPELESS: 0
SUM OF ALL RESPONSES TO PHQ9 QUESTIONS 1 & 2: 0
SUM OF ALL RESPONSES TO PHQ QUESTIONS 1-9: 0

## 2023-06-20 NOTE — PROGRESS NOTES
Subjective:      Patient ID: Vinson Halsted  is a 65 y.o. y.o. female. Althea Mina presents today for annual examination. She has h/o hysterectomy 40 yr ago, 1 ovary remaining (left). She continues to use tobacco, smoking 1 PPD, trying to quit but did not tolerate the patches. She reports she has no other concerns. Review of Systems   Constitutional: Negative. HENT: Negative. Eyes: Negative. Respiratory: Negative. Cardiovascular: Negative. Gastrointestinal: Negative. Genitourinary: Negative. Musculoskeletal: Negative. Skin: Negative. Neurological: Negative. Psychiatric/Behavioral: Negative. Breast ROS: negative    Objective:   /74 (Site: Right Upper Arm, Position: Sitting, Cuff Size: Medium Adult)   Pulse 74   Resp (!) 98   Ht 5' 2\" (1.575 m)   Wt 130 lb (59 kg)   LMP  (LMP Unknown)   BMI 23.78 kg/m²   Physical Exam  Constitutional:       Appearance: She is well-developed and normal weight. HENT:      Head: Normocephalic and atraumatic. Eyes:      Conjunctiva/sclera: Conjunctivae normal.   Cardiovascular:      Rate and Rhythm: Normal rate and regular rhythm. Heart sounds: Normal heart sounds. Pulmonary:      Effort: Pulmonary effort is normal.      Breath sounds: Normal breath sounds. Chest:   Breasts:     Breasts are symmetrical.      Right: No inverted nipple, mass, nipple discharge, skin change or tenderness. Left: No inverted nipple, mass, nipple discharge, skin change or tenderness. Abdominal:      Palpations: Abdomen is soft. Genitourinary:     General: Normal vulva. Vagina: Normal.      Rectum: Normal.      Comments: External genitalia WNL, no lesions noted. Vaginal canal is pale pink with few rugae and scanty secretions. Cervix, uterus and right adnexa surgically absent, Left ovary nonpalpable. No abnormalities noted. Musculoskeletal:         General: Normal range of motion.       Cervical back: Normal range of motion and neck

## 2023-06-21 DIAGNOSIS — N34.2 INFECTIVE URETHRITIS: Primary | ICD-10-CM

## 2023-06-21 LAB
MICROORGANISM SPEC CULT: ABNORMAL
MICROORGANISM SPEC CULT: ABNORMAL
SPECIMEN DESCRIPTION: ABNORMAL

## 2023-06-21 RX ORDER — NITROFURANTOIN 25; 75 MG/1; MG/1
100 CAPSULE ORAL 2 TIMES DAILY
Qty: 14 CAPSULE | Refills: 0 | Status: SHIPPED | OUTPATIENT
Start: 2023-06-21 | End: 2023-06-28

## 2023-11-20 ENCOUNTER — HOSPITAL ENCOUNTER (OUTPATIENT)
Age: 63
Discharge: HOME | End: 2023-11-20
Payer: COMMERCIAL

## 2023-11-20 ENCOUNTER — OFFICE VISIT (OUTPATIENT)
Dept: FAMILY MEDICINE CLINIC | Age: 63
End: 2023-11-20
Payer: COMMERCIAL

## 2023-11-20 ENCOUNTER — HOSPITAL ENCOUNTER (OUTPATIENT)
Age: 63
Setting detail: SPECIMEN
Discharge: HOME | End: 2023-11-20
Payer: COMMERCIAL

## 2023-11-20 VITALS
HEIGHT: 64 IN | BODY MASS INDEX: 22.36 KG/M2 | DIASTOLIC BLOOD PRESSURE: 78 MMHG | OXYGEN SATURATION: 95 % | WEIGHT: 131 LBS | HEART RATE: 60 BPM | SYSTOLIC BLOOD PRESSURE: 112 MMHG | TEMPERATURE: 97.8 F

## 2023-11-20 DIAGNOSIS — I10 ESSENTIAL HYPERTENSION: ICD-10-CM

## 2023-11-20 DIAGNOSIS — R73.01 IMPAIRED FASTING BLOOD SUGAR: ICD-10-CM

## 2023-11-20 DIAGNOSIS — N30.00 ACUTE CYSTITIS WITHOUT HEMATURIA: ICD-10-CM

## 2023-11-20 DIAGNOSIS — M81.0 AGE-RELATED OSTEOPOROSIS WITHOUT CURRENT PATHOLOGICAL FRACTURE: ICD-10-CM

## 2023-11-20 DIAGNOSIS — E78.00 HYPERCHOLESTEROLEMIA: ICD-10-CM

## 2023-11-20 DIAGNOSIS — R35.0 FREQUENCY OF URINATION: ICD-10-CM

## 2023-11-20 DIAGNOSIS — N30.00 ACUTE CYSTITIS WITHOUT HEMATURIA: Primary | ICD-10-CM

## 2023-11-20 DIAGNOSIS — I10 ESSENTIAL HYPERTENSION: Primary | ICD-10-CM

## 2023-11-20 DIAGNOSIS — E78.2 MIXED HYPERLIPIDEMIA: ICD-10-CM

## 2023-11-20 LAB
25(OH)D3 SERPL-MCNC: 53.6 NG/ML (ref 30–100)
ALBUMIN SERPL-MCNC: 4.5 G/DL (ref 3.5–5.2)
ALBUMIN/GLOB SERPL: 1.7 {RATIO} (ref 1–2.5)
ALP SERPL-CCNC: 65 U/L (ref 35–104)
ALT SERPL-CCNC: 17 U/L (ref 5–33)
ANION GAP SERPL CALCULATED.3IONS-SCNC: 9 MMOL/L (ref 9–17)
AST SERPL-CCNC: 17 U/L
BACTERIA URNS QL MICRO: ABNORMAL
BILIRUB SERPL-MCNC: 0.3 MG/DL (ref 0.3–1.2)
BILIRUB UR QL STRIP: NEGATIVE
BUN SERPL-MCNC: 17 MG/DL (ref 8–23)
BUN/CREAT SERPL: 15 (ref 9–20)
CALCIUM SERPL-MCNC: 9.6 MG/DL (ref 8.6–10.4)
CHARACTER UR: ABNORMAL
CHLORIDE SERPL-SCNC: 102 MMOL/L (ref 98–107)
CHOLEST SERPL-MCNC: 259 MG/DL (ref 0–199)
CHOLESTEROL/HDL RATIO: 5
CLARITY UR: ABNORMAL
CO2 SERPL-SCNC: 28 MMOL/L (ref 20–31)
COLOR UR: YELLOW
CREAT SERPL-MCNC: 1.1 MG/DL (ref 0.5–0.9)
EPI CELLS #/AREA URNS HPF: ABNORMAL /HPF (ref 0–5)
EST. AVERAGE GLUCOSE BLD GHB EST-MCNC: 108 MG/DL
GFR SERPL CREATININE-BSD FRML MDRD: 57 ML/MIN/1.73M2
GLUCOSE SERPL-MCNC: 98 MG/DL (ref 70–99)
GLUCOSE UR STRIP-MCNC: NEGATIVE MG/DL
HBA1C MFR BLD: 5.4 % (ref 4–6)
HDLC SERPL-MCNC: 52 MG/DL
HGB UR QL STRIP.AUTO: NEGATIVE
KETONES UR STRIP-MCNC: NEGATIVE MG/DL
LDLC SERPL CALC-MCNC: 169 MG/DL (ref 0–100)
LEUKOCYTE ESTERASE UR QL STRIP: NEGATIVE
NITRITE UR QL STRIP: POSITIVE
PH UR STRIP: 6 [PH] (ref 5–6)
POTASSIUM SERPL-SCNC: 4.5 MMOL/L (ref 3.7–5.3)
PROT SERPL-MCNC: 7.1 G/DL (ref 6.4–8.3)
PROT UR STRIP-MCNC: NEGATIVE MG/DL
RBC #/AREA URNS HPF: ABNORMAL /HPF (ref 0–4)
SODIUM SERPL-SCNC: 139 MMOL/L (ref 135–144)
SP GR UR STRIP: 1.02 (ref 1.01–1.02)
TRIGL SERPL-MCNC: 188 MG/DL (ref 0–149)
UROBILINOGEN UR STRIP-ACNC: NORMAL EU/DL (ref 0–1)
VLDLC SERPL CALC-MCNC: 38 MG/DL
WBC #/AREA URNS HPF: ABNORMAL /HPF (ref 0–4)

## 2023-11-20 PROCEDURE — 36415 COLL VENOUS BLD VENIPUNCTURE: CPT

## 2023-11-20 PROCEDURE — 3078F DIAST BP <80 MM HG: CPT | Performed by: NURSE PRACTITIONER

## 2023-11-20 PROCEDURE — 87186 SC STD MICRODIL/AGAR DIL: CPT

## 2023-11-20 PROCEDURE — 3017F COLORECTAL CA SCREEN DOC REV: CPT | Performed by: NURSE PRACTITIONER

## 2023-11-20 PROCEDURE — 99214 OFFICE O/P EST MOD 30 MIN: CPT | Performed by: NURSE PRACTITIONER

## 2023-11-20 PROCEDURE — 80061 LIPID PANEL: CPT

## 2023-11-20 PROCEDURE — 87086 URINE CULTURE/COLONY COUNT: CPT

## 2023-11-20 PROCEDURE — 80053 COMPREHEN METABOLIC PANEL: CPT

## 2023-11-20 PROCEDURE — 4004F PT TOBACCO SCREEN RCVD TLK: CPT | Performed by: NURSE PRACTITIONER

## 2023-11-20 PROCEDURE — 87088 URINE BACTERIA CULTURE: CPT

## 2023-11-20 PROCEDURE — 81001 URINALYSIS AUTO W/SCOPE: CPT

## 2023-11-20 PROCEDURE — G8484 FLU IMMUNIZE NO ADMIN: HCPCS | Performed by: NURSE PRACTITIONER

## 2023-11-20 PROCEDURE — 82306 VITAMIN D 25 HYDROXY: CPT

## 2023-11-20 PROCEDURE — G8428 CUR MEDS NOT DOCUMENT: HCPCS | Performed by: NURSE PRACTITIONER

## 2023-11-20 PROCEDURE — 3074F SYST BP LT 130 MM HG: CPT | Performed by: NURSE PRACTITIONER

## 2023-11-20 PROCEDURE — G8420 CALC BMI NORM PARAMETERS: HCPCS | Performed by: NURSE PRACTITIONER

## 2023-11-20 PROCEDURE — 83036 HEMOGLOBIN GLYCOSYLATED A1C: CPT

## 2023-11-20 RX ORDER — ATORVASTATIN CALCIUM 40 MG/1
40 TABLET, FILM COATED ORAL DAILY
Qty: 30 TABLET | Refills: 5 | Status: SHIPPED | OUTPATIENT
Start: 2023-11-20

## 2023-11-20 RX ORDER — LISINOPRIL 10 MG/1
TABLET ORAL
Qty: 30 TABLET | Refills: 5 | Status: SHIPPED | OUTPATIENT
Start: 2023-11-20

## 2023-11-20 RX ORDER — CEPHALEXIN 500 MG/1
500 CAPSULE ORAL 2 TIMES DAILY
Qty: 10 CAPSULE | Refills: 0 | Status: SHIPPED | OUTPATIENT
Start: 2023-11-20 | End: 2023-11-25

## 2023-11-20 ASSESSMENT — ENCOUNTER SYMPTOMS
SHORTNESS OF BREATH: 0
WHEEZING: 0
CONSTIPATION: 0
CHEST TIGHTNESS: 0
NAUSEA: 0
ABDOMINAL PAIN: 0
DIARRHEA: 0
COLOR CHANGE: 0
VOMITING: 0
ABDOMINAL DISTENTION: 0
COUGH: 0

## 2023-11-21 DIAGNOSIS — E78.2 MIXED HYPERLIPIDEMIA: Primary | ICD-10-CM

## 2023-11-21 LAB
MICROORGANISM SPEC CULT: ABNORMAL
SPECIMEN DESCRIPTION: ABNORMAL

## 2023-11-23 ASSESSMENT — ENCOUNTER SYMPTOMS
BLURRED VISION: 0
BOWEL INCONTINENCE: 0
ORTHOPNEA: 0

## 2024-03-06 DIAGNOSIS — M81.0 AGE-RELATED OSTEOPOROSIS WITHOUT CURRENT PATHOLOGICAL FRACTURE: ICD-10-CM

## 2024-03-07 RX ORDER — ALENDRONATE SODIUM 70 MG/1
TABLET ORAL
Qty: 4 TABLET | Refills: 12 | Status: SHIPPED | OUTPATIENT
Start: 2024-03-07

## 2024-03-14 ENCOUNTER — OFFICE VISIT (OUTPATIENT)
Dept: PRIMARY CARE CLINIC | Age: 64
End: 2024-03-14

## 2024-03-14 ENCOUNTER — HOSPITAL ENCOUNTER (OUTPATIENT)
Age: 64
Discharge: HOME | End: 2024-03-14
Payer: COMMERCIAL

## 2024-03-14 VITALS
SYSTOLIC BLOOD PRESSURE: 122 MMHG | HEART RATE: 74 BPM | BODY MASS INDEX: 23 KG/M2 | TEMPERATURE: 98 F | DIASTOLIC BLOOD PRESSURE: 60 MMHG | WEIGHT: 134 LBS | OXYGEN SATURATION: 98 %

## 2024-03-14 DIAGNOSIS — R35.0 URINE FREQUENCY: ICD-10-CM

## 2024-03-14 DIAGNOSIS — H66.002 NON-RECURRENT ACUTE SUPPURATIVE OTITIS MEDIA OF LEFT EAR WITHOUT SPONTANEOUS RUPTURE OF TYMPANIC MEMBRANE: Primary | ICD-10-CM

## 2024-03-14 LAB
BILIRUB UR QL STRIP: NEGATIVE
CLARITY UR: CLEAR
COLOR UR: YELLOW
COMMENT: NORMAL
GLUCOSE UR STRIP-MCNC: NEGATIVE MG/DL
HGB UR QL STRIP.AUTO: NEGATIVE
KETONES UR STRIP-MCNC: NEGATIVE MG/DL
LEUKOCYTE ESTERASE UR QL STRIP: NEGATIVE
NITRITE UR QL STRIP: NEGATIVE
PH UR STRIP: 6 [PH] (ref 5–6)
PROT UR STRIP-MCNC: NEGATIVE MG/DL
SP GR UR STRIP: 1.02 (ref 1.01–1.02)
UROBILINOGEN UR STRIP-ACNC: NORMAL EU/DL (ref 0–1)

## 2024-03-14 PROCEDURE — 81003 URINALYSIS AUTO W/O SCOPE: CPT

## 2024-03-14 RX ORDER — AMOXICILLIN 500 MG/1
500 CAPSULE ORAL 2 TIMES DAILY
Qty: 20 CAPSULE | Refills: 0 | Status: SHIPPED | OUTPATIENT
Start: 2024-03-14 | End: 2024-03-24

## 2024-03-14 ASSESSMENT — PATIENT HEALTH QUESTIONNAIRE - PHQ9
1. LITTLE INTEREST OR PLEASURE IN DOING THINGS: 0
SUM OF ALL RESPONSES TO PHQ QUESTIONS 1-9: 0
SUM OF ALL RESPONSES TO PHQ QUESTIONS 1-9: 0
SUM OF ALL RESPONSES TO PHQ9 QUESTIONS 1 & 2: 0
2. FEELING DOWN, DEPRESSED OR HOPELESS: 0
SUM OF ALL RESPONSES TO PHQ QUESTIONS 1-9: 0
SUM OF ALL RESPONSES TO PHQ QUESTIONS 1-9: 0

## 2024-03-14 ASSESSMENT — ENCOUNTER SYMPTOMS
ABDOMINAL PAIN: 0
COUGH: 0
SHORTNESS OF BREATH: 0
RHINORRHEA: 0

## 2024-03-14 NOTE — PROGRESS NOTES
given.  Amoxicillin x 10days  Take full course of antibiotic. Take Tylenol or ibuprofen for fever or pain. Keep ear dry and clean. Follow up with PCP if symptoms persist or worsen.    Discussed exam, POCT findings, plan of care, and follow-up at length with patient.  Reviewed all prescribed and recommended medications, administration and side effects. Encouraged patient to follow up with PCP or return to the clinic for no improvement and or worsening of symptoms. All questions were answered and they verbalized understanding and were agreeable with the plan.     Follow up as needed.        Electronically signed by AARTI Ray CNP on 3/14/2024 at 2:05 PM

## 2024-03-14 NOTE — PATIENT INSTRUCTIONS
Amoxicillin x 10days  Take full course of antibiotic. Take Tylenol or ibuprofen for fever or pain. Keep ear dry and clean. Follow up with PCP if symptoms persist or worsen.

## 2024-03-28 DIAGNOSIS — M48.061 NARROWING OF LUMBAR SPINE: ICD-10-CM

## 2024-03-28 RX ORDER — IBUPROFEN 800 MG/1
TABLET ORAL
Qty: 270 TABLET | Refills: 1 | Status: SHIPPED | OUTPATIENT
Start: 2024-03-28

## 2024-04-18 ENCOUNTER — HOSPITAL ENCOUNTER (OUTPATIENT)
Dept: MAMMOGRAPHY | Age: 64
Discharge: HOME | End: 2024-04-20
Payer: COMMERCIAL

## 2024-04-18 VITALS — HEIGHT: 66 IN | BODY MASS INDEX: 21.69 KG/M2 | WEIGHT: 135 LBS

## 2024-04-18 DIAGNOSIS — Z12.31 BREAST CANCER SCREENING BY MAMMOGRAM: ICD-10-CM

## 2024-04-18 PROCEDURE — 77063 BREAST TOMOSYNTHESIS BI: CPT

## 2024-05-10 DIAGNOSIS — E55.9 VITAMIN D DEFICIENCY: ICD-10-CM

## 2024-05-10 RX ORDER — ERGOCALCIFEROL 1.25 MG/1
CAPSULE ORAL
Qty: 16 CAPSULE | Refills: 3 | Status: SHIPPED | OUTPATIENT
Start: 2024-05-10

## 2024-05-21 ENCOUNTER — HOSPITAL ENCOUNTER (OUTPATIENT)
Age: 64
Setting detail: SPECIMEN
Discharge: HOME | End: 2024-05-21
Payer: COMMERCIAL

## 2024-05-21 ENCOUNTER — OFFICE VISIT (OUTPATIENT)
Dept: FAMILY MEDICINE CLINIC | Age: 64
End: 2024-05-21
Payer: COMMERCIAL

## 2024-05-21 VITALS
OXYGEN SATURATION: 98 % | HEART RATE: 72 BPM | DIASTOLIC BLOOD PRESSURE: 76 MMHG | BODY MASS INDEX: 22.02 KG/M2 | HEIGHT: 66 IN | SYSTOLIC BLOOD PRESSURE: 124 MMHG | WEIGHT: 137 LBS

## 2024-05-21 DIAGNOSIS — Z72.0 TOBACCO ABUSE: ICD-10-CM

## 2024-05-21 DIAGNOSIS — M54.50 LOW BACK PAIN WITHOUT SCIATICA, UNSPECIFIED BACK PAIN LATERALITY, UNSPECIFIED CHRONICITY: ICD-10-CM

## 2024-05-21 DIAGNOSIS — N28.9 IMPAIRED RENAL FUNCTION: ICD-10-CM

## 2024-05-21 DIAGNOSIS — N30.00 ACUTE CYSTITIS WITHOUT HEMATURIA: Primary | ICD-10-CM

## 2024-05-21 DIAGNOSIS — N30.00 ACUTE CYSTITIS WITHOUT HEMATURIA: ICD-10-CM

## 2024-05-21 DIAGNOSIS — M81.0 AGE-RELATED OSTEOPOROSIS WITHOUT CURRENT PATHOLOGICAL FRACTURE: ICD-10-CM

## 2024-05-21 DIAGNOSIS — I10 ESSENTIAL HYPERTENSION: Primary | ICD-10-CM

## 2024-05-21 DIAGNOSIS — E78.2 MIXED HYPERLIPIDEMIA: ICD-10-CM

## 2024-05-21 LAB
BACTERIA URNS QL MICRO: ABNORMAL
BILIRUB UR QL STRIP: NEGATIVE
CHARACTER UR: ABNORMAL
CLARITY UR: CLEAR
COLOR UR: YELLOW
EPI CELLS #/AREA URNS HPF: ABNORMAL /HPF (ref 0–5)
GLUCOSE UR STRIP-MCNC: NEGATIVE MG/DL
HGB UR QL STRIP.AUTO: NEGATIVE
KETONES UR STRIP-MCNC: NEGATIVE MG/DL
LEUKOCYTE ESTERASE UR QL STRIP: NEGATIVE
NITRITE UR QL STRIP: POSITIVE
PH UR STRIP: 6 [PH] (ref 5–6)
PROT UR STRIP-MCNC: NEGATIVE MG/DL
RBC #/AREA URNS HPF: ABNORMAL /HPF (ref 0–4)
SP GR UR STRIP: 1.02 (ref 1.01–1.02)
UROBILINOGEN UR STRIP-ACNC: NORMAL EU/DL (ref 0–1)
WBC #/AREA URNS HPF: ABNORMAL /HPF (ref 0–4)

## 2024-05-21 PROCEDURE — 3078F DIAST BP <80 MM HG: CPT | Performed by: NURSE PRACTITIONER

## 2024-05-21 PROCEDURE — 99214 OFFICE O/P EST MOD 30 MIN: CPT | Performed by: NURSE PRACTITIONER

## 2024-05-21 PROCEDURE — 87088 URINE BACTERIA CULTURE: CPT

## 2024-05-21 PROCEDURE — 3017F COLORECTAL CA SCREEN DOC REV: CPT | Performed by: NURSE PRACTITIONER

## 2024-05-21 PROCEDURE — 4004F PT TOBACCO SCREEN RCVD TLK: CPT | Performed by: NURSE PRACTITIONER

## 2024-05-21 PROCEDURE — G8427 DOCREV CUR MEDS BY ELIG CLIN: HCPCS | Performed by: NURSE PRACTITIONER

## 2024-05-21 PROCEDURE — 81001 URINALYSIS AUTO W/SCOPE: CPT

## 2024-05-21 PROCEDURE — 87086 URINE CULTURE/COLONY COUNT: CPT

## 2024-05-21 PROCEDURE — 3074F SYST BP LT 130 MM HG: CPT | Performed by: NURSE PRACTITIONER

## 2024-05-21 PROCEDURE — 87186 SC STD MICRODIL/AGAR DIL: CPT

## 2024-05-21 PROCEDURE — G8420 CALC BMI NORM PARAMETERS: HCPCS | Performed by: NURSE PRACTITIONER

## 2024-05-21 RX ORDER — LISINOPRIL 10 MG/1
TABLET ORAL
Qty: 90 TABLET | Refills: 1 | Status: SHIPPED | OUTPATIENT
Start: 2024-05-21

## 2024-05-21 RX ORDER — ATORVASTATIN CALCIUM 40 MG/1
40 TABLET, FILM COATED ORAL DAILY
Qty: 90 TABLET | Refills: 1 | Status: SHIPPED | OUTPATIENT
Start: 2024-05-21

## 2024-05-21 RX ORDER — CEPHALEXIN 500 MG/1
500 CAPSULE ORAL 2 TIMES DAILY
Qty: 14 CAPSULE | Refills: 0 | Status: SHIPPED | OUTPATIENT
Start: 2024-05-21 | End: 2024-05-28

## 2024-05-21 ASSESSMENT — ENCOUNTER SYMPTOMS
COUGH: 0
CHEST TIGHTNESS: 0
ORTHOPNEA: 0
NAUSEA: 0
VOMITING: 0
BACK PAIN: 1
BLURRED VISION: 0
COLOR CHANGE: 0
CONSTIPATION: 0
ABDOMINAL PAIN: 1
WHEEZING: 0
DIARRHEA: 0
SHORTNESS OF BREATH: 0
ABDOMINAL DISTENTION: 0

## 2024-05-21 NOTE — PROGRESS NOTES
[facility]  [address] [facility] [address]  Dept: [phone]  Dept Fax: [phone]  Loc: [phone]    Maggie Kaplan is a 63 y.o. female who presents today for her medical conditions/complaintsas noted below.  Maggie Kaplan is c/o of   Chief Complaint   Patient presents with    Hypertension    Back Pain     One week    Cholesterol Problem     HPI:     Patient here for a recheck. Overall is doing well. Still waiting to hear what will happen with her job at [email or web address] due to their bankruptcy pending. Is concerned she may have a UTI. Has been having symptoms for a few days. Unchanged. C/o low back discomfort, low abdomen discomfort. Denies dysuria, frequency, fever, chills, nausea, vomiting. Nothing is making better or worse. No treatment. Denies any other issues or concerns today. Has not had lipid panel redrawn yet, does report she is now taking medication daily as ordered, last visit had reports she was not consistent in taking cholesterol medication.  Osteoporosis - had a DEXA scan in 2022 that showed osteoporosis. Currently takes fosamax. Tolerating medication well. Denies side effects or issues with medication. Denies any recent fractures  Tobacco Abuse - continues to smoke daily about 1 pack/day.    Hypertension  This is a chronic problem. The problem is controlled. Pertinent negatives include no anxiety, blurred vision, chest pain, headaches, malaise/fatigue, neck pain, orthopnea, palpitations, peripheral edema, PND, shortness of breath or sweats. Past treatments include ACE inhibitors. The current treatment provides significant improvement. There are no compliance problems.    Hyperlipidemia  This is a chronic problem. The problem is uncontrolled. Recent lipid tests were reviewed and are high. Pertinent negatives include no chest pain, focal sensory loss, focal weakness, leg pain, myalgias or shortness

## 2024-05-23 LAB
MICROORGANISM SPEC CULT: ABNORMAL
SPECIMEN DESCRIPTION: ABNORMAL

## 2024-06-12 DIAGNOSIS — M62.838 MUSCLE SPASM: ICD-10-CM

## 2024-06-12 RX ORDER — CYCLOBENZAPRINE HCL 10 MG
TABLET ORAL
Qty: 180 TABLET | Refills: 1 | Status: SHIPPED | OUTPATIENT
Start: 2024-06-12

## 2024-06-12 NOTE — TELEPHONE ENCOUNTER
Maggie called requesting a refill of the below medication which has been pended for you:     Requested Prescriptions     Pending Prescriptions Disp Refills    cyclobenzaprine (FLEXERIL) 10 MG tablet 60 tablet 5       Last Appointment Date: 5/21/2024  Next Appointment Date: 11/21/2024    No Known Allergies

## 2024-06-24 ENCOUNTER — HOSPITAL ENCOUNTER (OUTPATIENT)
Age: 64
Discharge: HOME | End: 2024-06-24
Payer: COMMERCIAL

## 2024-06-24 DIAGNOSIS — N28.9 IMPAIRED RENAL FUNCTION: ICD-10-CM

## 2024-06-24 DIAGNOSIS — E78.2 MIXED HYPERLIPIDEMIA: ICD-10-CM

## 2024-06-24 LAB
ANION GAP SERPL CALCULATED.3IONS-SCNC: 10 MMOL/L (ref 9–17)
BUN SERPL-MCNC: 16 MG/DL (ref 8–23)
BUN/CREAT SERPL: 16 (ref 9–20)
CALCIUM SERPL-MCNC: 10.1 MG/DL (ref 8.6–10.4)
CHLORIDE SERPL-SCNC: 103 MMOL/L (ref 98–107)
CHOLEST SERPL-MCNC: 255 MG/DL (ref 0–199)
CHOLESTEROL/HDL RATIO: 5
CO2 SERPL-SCNC: 27 MMOL/L (ref 20–31)
CREAT SERPL-MCNC: 1 MG/DL (ref 0.5–0.9)
GFR, ESTIMATED: 64 ML/MIN/1.73M2
GLUCOSE SERPL-MCNC: 100 MG/DL (ref 70–99)
HDLC SERPL-MCNC: 55 MG/DL
LDLC SERPL CALC-MCNC: 171 MG/DL (ref 0–100)
POTASSIUM SERPL-SCNC: 4.3 MMOL/L (ref 3.7–5.3)
SODIUM SERPL-SCNC: 140 MMOL/L (ref 135–144)
TRIGL SERPL-MCNC: 142 MG/DL
VLDLC SERPL CALC-MCNC: 28 MG/DL

## 2024-06-24 PROCEDURE — 80061 LIPID PANEL: CPT

## 2024-06-24 PROCEDURE — 36415 COLL VENOUS BLD VENIPUNCTURE: CPT

## 2024-06-24 PROCEDURE — 80048 BASIC METABOLIC PNL TOTAL CA: CPT

## 2024-06-25 ENCOUNTER — TELEPHONE (OUTPATIENT)
Dept: FAMILY MEDICINE CLINIC | Age: 64
End: 2024-06-25

## 2024-06-25 DIAGNOSIS — E78.2 MIXED HYPERLIPIDEMIA: ICD-10-CM

## 2024-06-25 NOTE — TELEPHONE ENCOUNTER
----- Message from AARTI Oliva - CNP sent at 6/25/2024  8:02 AM EDT -----  Lipid panel is still significantly elevated. Can we make sure she is taking her cholesterol medication, if she is we need to increase her atorvastatin.   Needs to watch diet and exercise

## 2024-06-25 NOTE — TELEPHONE ENCOUNTER
Pt returning call, pt informed of elevated lipids, pt states she has been taking her cholesterol meds as directed, informed pt we would need to increase dose and pt requesting that to be sent to [facility] pharmacy, pt also advised to watch her diet and exercise.

## 2024-06-26 RX ORDER — ATORVASTATIN CALCIUM 80 MG/1
80 TABLET, FILM COATED ORAL DAILY
Qty: 90 TABLET | Refills: 0 | Status: SHIPPED | OUTPATIENT
Start: 2024-06-26

## 2024-07-16 ENCOUNTER — HOSPITAL ENCOUNTER (OUTPATIENT)
Age: 64
Setting detail: SPECIMEN
Discharge: HOME | End: 2024-07-16
Payer: COMMERCIAL

## 2024-07-16 ENCOUNTER — OFFICE VISIT (OUTPATIENT)
Dept: OBGYN | Age: 64
End: 2024-07-16
Payer: COMMERCIAL

## 2024-07-16 VITALS
OXYGEN SATURATION: 98 % | DIASTOLIC BLOOD PRESSURE: 88 MMHG | BODY MASS INDEX: 22.18 KG/M2 | HEIGHT: 66 IN | WEIGHT: 138 LBS | RESPIRATION RATE: 14 BRPM | SYSTOLIC BLOOD PRESSURE: 136 MMHG | HEART RATE: 63 BPM

## 2024-07-16 DIAGNOSIS — R35.0 URINARY FREQUENCY: ICD-10-CM

## 2024-07-16 DIAGNOSIS — R39.15 URINARY URGENCY: ICD-10-CM

## 2024-07-16 DIAGNOSIS — Z01.419 WOMEN'S ANNUAL ROUTINE GYNECOLOGICAL EXAMINATION: Primary | ICD-10-CM

## 2024-07-16 DIAGNOSIS — R10.9 FLANK PAIN: ICD-10-CM

## 2024-07-16 DIAGNOSIS — Z12.31 ENCOUNTER FOR SCREENING MAMMOGRAM FOR BREAST CANCER: ICD-10-CM

## 2024-07-16 DIAGNOSIS — Z12.11 SCREEN FOR COLON CANCER: ICD-10-CM

## 2024-07-16 DIAGNOSIS — Z12.4 ENCOUNTER FOR SCREENING FOR CERVICAL CANCER: ICD-10-CM

## 2024-07-16 LAB
BACTERIA URNS QL MICRO: ABNORMAL
BILIRUB UR QL STRIP: NEGATIVE
CHARACTER UR: ABNORMAL
CLARITY UR: CLEAR
COLOR UR: YELLOW
EPI CELLS #/AREA URNS HPF: ABNORMAL /HPF (ref 0–5)
GLUCOSE UR STRIP-MCNC: NEGATIVE MG/DL
HGB UR QL STRIP.AUTO: NEGATIVE
KETONES UR STRIP-MCNC: NEGATIVE MG/DL
LEUKOCYTE ESTERASE UR QL STRIP: NEGATIVE
NITRITE UR QL STRIP: POSITIVE
PH UR STRIP: 6.5 [PH] (ref 5–6)
PROT UR STRIP-MCNC: NEGATIVE MG/DL
RBC #/AREA URNS HPF: ABNORMAL /HPF (ref 0–4)
SP GR UR STRIP: 1.02 (ref 1.01–1.02)
UROBILINOGEN UR STRIP-ACNC: NORMAL EU/DL (ref 0–1)
WBC #/AREA URNS HPF: ABNORMAL /HPF (ref 0–4)

## 2024-07-16 PROCEDURE — 3079F DIAST BP 80-89 MM HG: CPT | Performed by: ADVANCED PRACTICE MIDWIFE

## 2024-07-16 PROCEDURE — 81001 URINALYSIS AUTO W/SCOPE: CPT

## 2024-07-16 PROCEDURE — 87086 URINE CULTURE/COLONY COUNT: CPT

## 2024-07-16 PROCEDURE — 99396 PREV VISIT EST AGE 40-64: CPT | Performed by: ADVANCED PRACTICE MIDWIFE

## 2024-07-16 PROCEDURE — 87088 URINE BACTERIA CULTURE: CPT

## 2024-07-16 PROCEDURE — 3075F SYST BP GE 130 - 139MM HG: CPT | Performed by: ADVANCED PRACTICE MIDWIFE

## 2024-07-16 PROCEDURE — 87186 SC STD MICRODIL/AGAR DIL: CPT

## 2024-07-16 SDOH — ECONOMIC STABILITY: FOOD INSECURITY: WITHIN THE PAST 12 MONTHS, THE FOOD YOU BOUGHT JUST DIDN'T LAST AND YOU DIDN'T HAVE MONEY TO GET MORE.: NEVER TRUE

## 2024-07-16 SDOH — ECONOMIC STABILITY: FOOD INSECURITY: WITHIN THE PAST 12 MONTHS, YOU WORRIED THAT YOUR FOOD WOULD RUN OUT BEFORE YOU GOT MONEY TO BUY MORE.: NEVER TRUE

## 2024-07-16 SDOH — ECONOMIC STABILITY: INCOME INSECURITY: HOW HARD IS IT FOR YOU TO PAY FOR THE VERY BASICS LIKE FOOD, HOUSING, MEDICAL CARE, AND HEATING?: NOT VERY HARD

## 2024-07-16 ASSESSMENT — PATIENT HEALTH QUESTIONNAIRE - PHQ9
1. LITTLE INTEREST OR PLEASURE IN DOING THINGS: NOT AT ALL
SUM OF ALL RESPONSES TO PHQ QUESTIONS 1-9: 0
SUM OF ALL RESPONSES TO PHQ9 QUESTIONS 1 & 2: 0
SUM OF ALL RESPONSES TO PHQ QUESTIONS 1-9: 0
SUM OF ALL RESPONSES TO PHQ QUESTIONS 1-9: 0
2. FEELING DOWN, DEPRESSED OR HOPELESS: NOT AT ALL
SUM OF ALL RESPONSES TO PHQ QUESTIONS 1-9: 0

## 2024-07-16 ASSESSMENT — ENCOUNTER SYMPTOMS
RESPIRATORY NEGATIVE: 1
GASTROINTESTINAL NEGATIVE: 1
EYES NEGATIVE: 1

## 2024-07-16 NOTE — PROGRESS NOTES
Subjective:      Patient ID: Maggie Kaplan  is a 63 y.o. y.o. female.    [place] presents today for annual examination. She has h/o hysterectomy 40+ yr ago,  (age 22-23),1 ovary remaining (left). She continues to use tobacco, smoking reduced to 1/2 PPD, continues to try to quit. She reports she has concern of low back pain and urinary frequency.          Review of Systems   Constitutional: Negative.    HENT: Negative.     Eyes: Negative.    Respiratory: Negative.     Cardiovascular: Negative.    Gastrointestinal: Negative.    Genitourinary:  Positive for dysuria.   Musculoskeletal: Negative.    Skin: Negative.    Neurological: Negative.    Psychiatric/Behavioral: Negative.     Breast: Negative    Objective:   /88   Pulse 63   Resp 14   Ht 1.676 m (5' 6\")   Wt 62.6 kg (138 lb)   LMP  (LMP Unknown)   SpO2 98%   BMI 22.27 kg/m²   Physical Exam  Constitutional:       Appearance: She is well-developed.   HENT:      Head: Normocephalic and atraumatic.   Eyes:      Conjunctiva/sclera: Conjunctivae normal.   Cardiovascular:      Rate and Rhythm: Normal rate and regular rhythm.      Heart sounds: Normal heart sounds.   Pulmonary:      Effort: Pulmonary effort is normal.      Breath sounds: Normal breath sounds.   Chest:   Breasts:     Breasts are symmetrical.      Right: No inverted nipple, mass, nipple discharge, skin change or tenderness.      Left: No inverted nipple, mass, nipple discharge, skin change or tenderness.   Abdominal:      General: Bowel sounds are normal.      Palpations: Abdomen is soft.   Genitourinary:     General: Normal vulva.      Vagina: Normal.      Rectum: Normal.      Comments: External genitalia WNL, no lesions noted. Note normal  changes. Vaginal canal is pale pink with few rugae and scanty secretions. Cervix, uterus and right adnexa surgically absent, Left ovary nonpalpable. No abnormalities noted.   Musculoskeletal:         General: Normal range of motion.      Cervical back:

## 2024-07-18 LAB
MICROORGANISM SPEC CULT: ABNORMAL
SERVICE CMNT-IMP: ABNORMAL
SPECIMEN DESCRIPTION: ABNORMAL

## 2024-08-09 ENCOUNTER — HOSPITAL ENCOUNTER (OUTPATIENT)
Age: 64
Discharge: HOME | End: 2024-08-09
Payer: COMMERCIAL

## 2024-08-09 ENCOUNTER — TELEPHONE (OUTPATIENT)
Dept: OBGYN | Age: 64
End: 2024-08-09

## 2024-08-09 DIAGNOSIS — R10.30 LOWER ABDOMINAL PAIN: ICD-10-CM

## 2024-08-09 DIAGNOSIS — R10.30 LOWER ABDOMINAL PAIN: Primary | ICD-10-CM

## 2024-08-09 PROCEDURE — 87186 SC STD MICRODIL/AGAR DIL: CPT

## 2024-08-09 PROCEDURE — 87088 URINE BACTERIA CULTURE: CPT

## 2024-08-09 PROCEDURE — 87086 URINE CULTURE/COLONY COUNT: CPT

## 2024-08-09 NOTE — TELEPHONE ENCOUNTER
Patient came into office asking about an antibiotic.  She was unable to obtain if from the pharm.  Provider was contacted and requested a urine culture order be placed and gave a verbal order prescription be sent into pharm for MACROBID 100 MG x2 DAILY for 7 days no refills.  Verbal order given to [facility] in [place] [phone] on pharmacy voicemail pharmacy line.

## 2024-08-11 LAB
MICROORGANISM SPEC CULT: ABNORMAL
SERVICE CMNT-IMP: ABNORMAL
SPECIMEN DESCRIPTION: ABNORMAL

## 2024-08-16 LAB — NONINV COLON CA DNA+OCC BLD SCRN STL QL: POSITIVE

## 2024-08-20 DIAGNOSIS — R19.5 POSITIVE COLORECTAL CANCER SCREENING USING COLOGUARD TEST: Primary | ICD-10-CM

## 2024-08-20 DIAGNOSIS — Z12.11 SCREENING FOR MALIGNANT NEOPLASM OF COLON: ICD-10-CM

## 2024-08-23 ENCOUNTER — TELEPHONE (OUTPATIENT)
Dept: SURGERY | Age: 64
End: 2024-08-23

## 2024-08-23 NOTE — TELEPHONE ENCOUNTER
Dr Montilla's colonoscopy paperwork sent. Patient had a positive cologuard. Patient request Dr Montilla.

## 2024-09-25 ENCOUNTER — OFFICE VISIT (OUTPATIENT)
Dept: SURGERY | Age: 64
End: 2024-09-25
Payer: COMMERCIAL

## 2024-09-25 VITALS
BODY MASS INDEX: 21.63 KG/M2 | SYSTOLIC BLOOD PRESSURE: 132 MMHG | OXYGEN SATURATION: 96 % | HEART RATE: 89 BPM | WEIGHT: 134.6 LBS | HEIGHT: 66 IN | TEMPERATURE: 98.2 F | DIASTOLIC BLOOD PRESSURE: 88 MMHG

## 2024-09-25 DIAGNOSIS — R19.5 POSITIVE COLORECTAL CANCER SCREENING USING COLOGUARD TEST: Primary | ICD-10-CM

## 2024-09-25 PROCEDURE — 3079F DIAST BP 80-89 MM HG: CPT | Performed by: PHYSICIAN ASSISTANT

## 2024-09-25 PROCEDURE — 3075F SYST BP GE 130 - 139MM HG: CPT | Performed by: PHYSICIAN ASSISTANT

## 2024-09-25 PROCEDURE — 99213 OFFICE O/P EST LOW 20 MIN: CPT | Performed by: PHYSICIAN ASSISTANT

## 2024-10-23 NOTE — H&P
was reviewed in detail and there were not significant changes to the patients health or status today.

## 2024-10-29 ENCOUNTER — HOSPITAL ENCOUNTER (OUTPATIENT)
Age: 64
Setting detail: OUTPATIENT SURGERY
Discharge: HOME | End: 2024-10-29
Attending: SURGERY | Admitting: SURGERY
Payer: COMMERCIAL

## 2024-10-29 ENCOUNTER — ANESTHESIA EVENT (OUTPATIENT)
Dept: OPERATING ROOM | Age: 64
End: 2024-10-29
Payer: COMMERCIAL

## 2024-10-29 ENCOUNTER — ANESTHESIA (OUTPATIENT)
Dept: OPERATING ROOM | Age: 64
End: 2024-10-29
Payer: COMMERCIAL

## 2024-10-29 VITALS
RESPIRATION RATE: 16 BRPM | SYSTOLIC BLOOD PRESSURE: 144 MMHG | HEART RATE: 60 BPM | BODY MASS INDEX: 22.67 KG/M2 | WEIGHT: 132.8 LBS | HEIGHT: 64 IN | DIASTOLIC BLOOD PRESSURE: 85 MMHG | OXYGEN SATURATION: 98 % | TEMPERATURE: 97.5 F

## 2024-10-29 DIAGNOSIS — R19.5 POSITIVE COLORECTAL CANCER SCREENING USING COLOGUARD TEST: ICD-10-CM

## 2024-10-29 PROCEDURE — 88305 TISSUE EXAM BY PATHOLOGIST: CPT

## 2024-10-29 PROCEDURE — 3609010600 HC COLONOSCOPY POLYPECTOMY SNARE/COLD BIOPSY: Performed by: SURGERY

## 2024-10-29 PROCEDURE — 7100000010 HC PHASE II RECOVERY - FIRST 15 MIN: Performed by: SURGERY

## 2024-10-29 PROCEDURE — 3700000001 HC ADD 15 MINUTES (ANESTHESIA): Performed by: SURGERY

## 2024-10-29 PROCEDURE — 2500000003 HC RX 250 WO HCPCS

## 2024-10-29 PROCEDURE — 7100000011 HC PHASE II RECOVERY - ADDTL 15 MIN: Performed by: SURGERY

## 2024-10-29 PROCEDURE — 3700000000 HC ANESTHESIA ATTENDED CARE: Performed by: SURGERY

## 2024-10-29 PROCEDURE — 2709999900 HC NON-CHARGEABLE SUPPLY: Performed by: SURGERY

## 2024-10-29 PROCEDURE — 6360000002 HC RX W HCPCS

## 2024-10-29 RX ORDER — SODIUM CHLORIDE, SODIUM LACTATE, POTASSIUM CHLORIDE, CALCIUM CHLORIDE 600; 310; 30; 20 MG/100ML; MG/100ML; MG/100ML; MG/100ML
INJECTION, SOLUTION INTRAVENOUS CONTINUOUS
Status: DISCONTINUED | OUTPATIENT
Start: 2024-10-29 | End: 2024-10-29 | Stop reason: HOSPADM

## 2024-10-29 RX ORDER — LIDOCAINE HYDROCHLORIDE 40 MG/ML
INJECTION, SOLUTION RETROBULBAR
Status: DISCONTINUED | OUTPATIENT
Start: 2024-10-29 | End: 2024-10-29 | Stop reason: SDUPTHER

## 2024-10-29 RX ORDER — LABETALOL HYDROCHLORIDE 5 MG/ML
INJECTION, SOLUTION INTRAVENOUS
Status: DISCONTINUED | OUTPATIENT
Start: 2024-10-29 | End: 2024-10-29 | Stop reason: SDUPTHER

## 2024-10-29 RX ORDER — PROPOFOL 10 MG/ML
INJECTION, EMULSION INTRAVENOUS
Status: DISCONTINUED | OUTPATIENT
Start: 2024-10-29 | End: 2024-10-29 | Stop reason: SDUPTHER

## 2024-10-29 RX ADMIN — LABETALOL HYDROCHLORIDE 2.5 MG: 5 INJECTION, SOLUTION INTRAVENOUS at 13:06

## 2024-10-29 RX ADMIN — LIDOCAINE HYDROCHLORIDE 40 MG: 40 INJECTION, SOLUTION RETROBULBAR; TOPICAL at 12:56

## 2024-10-29 RX ADMIN — PROPOFOL 150 MCG/KG/MIN: 10 INJECTION, EMULSION INTRAVENOUS at 12:57

## 2024-10-29 RX ADMIN — PROPOFOL 150 MG: 10 INJECTION, EMULSION INTRAVENOUS at 12:56

## 2024-10-29 ASSESSMENT — PAIN - FUNCTIONAL ASSESSMENT
PAIN_FUNCTIONAL_ASSESSMENT: 0-10
PAIN_FUNCTIONAL_ASSESSMENT: 0-10

## 2024-10-29 ASSESSMENT — LIFESTYLE VARIABLES: SMOKING_STATUS: 1

## 2024-10-29 NOTE — ANESTHESIA POSTPROCEDURE EVALUATION
Department of Anesthesiology  Postprocedure Note    Patient: Maggie Kaplan  MRN: [id number]  YOB: 1960  Date of evaluation: 10/29/2024    Procedure Summary       Date: 10/29/24 [facility]    Anesthesia Start: 1253 Anesthesia Stop: 1332    Procedure: COLONOSCOPY POLYPECTOMY SNARE/HOT BIOPSY Diagnosis:       Positive colorectal cancer screening using Cologuard test      (Positive colorectal cancer screening using Cologuard test [R19.5])    Surgeons: Lewis Montilla MD Responsible Provider: Ward Zavaleta APRN - CRNA    Anesthesia Type: General, TIVA ASA Status: 2            Anesthesia Type: General, TIVA    Marino Phase I: Marino Score: 10    Marino Phase II: Marino Score: 9    Anesthesia Post Evaluation    Patient location during evaluation: bedside  Patient participation: complete - patient participated  Level of consciousness: sleepy but conscious  Airway patency: patent  Nausea & Vomiting: no nausea  Cardiovascular status: hemodynamically stable  Respiratory status: room air and spontaneous ventilation  Hydration status: euvolemic  Pain management: satisfactory to patient    No notable events documented.

## 2024-10-29 NOTE — DISCHARGE INSTRUCTIONS
Await bx  Probably repeat  CS in 3 years due to 3 or more polyps      DISCHARGE INSTRUCTIONS FOLLOWING COLONOSCOPY    Activity  You have received sedation.  Your judgement and coordination may be impaired.  Do not drive or operate any machinery today.  Do not make personal, medical, legal or business decisions today.  Do not consume alcohol, tranquilizers or sleeping medications today unless otherwise instructed by your physician.  Rest today.  You may resume normal activity tomorrow.    Because air is put into your colon during this procedure, it is normal to pass large amounts of air from your rectum.  Feelings of bloating, gas or cramping may persist for 24 hours.  You may not have a bowel movement for 1-3 days after this procedure.    Diet  Begin with sips of clear liquids and if no nausea, you may progress to your normal diet.    Medications  You may resume your usual medications, unless otherwise instructed.    Follow-Up  As instructed.    CALL YOUR PHYSICIAN if you experience any of the following:  Bleeding from your rectum (a teaspoonful or more)      - If you had a biopsy taken today, a small amount of bleeding may occur.  Severe abdominal pain/cramping and/or distention that is not relieved by passing gas.  Persistent nausea or vomiting.  Signs of infection including fever, chills, redness or swelling @ the IV site.      If you have questions or problems call [phone] ([facility]) or after business hours call [phone] ([facility])

## 2024-10-29 NOTE — ANESTHESIA PRE PROCEDURE
• cyclobenzaprine (FLEXERIL) 10 MG tablet take 1 tablet by mouth twice a day if needed for muscle spasm 180 tablet 1   • lisinopril (PRINIVIL;ZESTRIL) 10 MG tablet take 1 tablet by mouth once daily 90 tablet 1   • vitamin D (ERGOCALCIFEROL) 1.25 MG (96824 UT) CAPS capsule take 1 capsule by mouth every week 16 capsule 3   • ibuprofen (ADVIL;MOTRIN) 800 MG tablet take 1 tablet by mouth three times a day if needed for pain 270 tablet 1   • alendronate (FOSAMAX) 70 MG tablet take 1 tablet by mouth every week 4 tablet 12   • Probiotic Product (PROBIOTIC-10 PO) Take by mouth With cranberry     • ondansetron (ZOFRAN ODT) 4 MG disintegrating tablet Take 1 tablet by mouth every 8 hours as needed for Nausea (Patient not taking: Reported on 9/25/2024) 20 tablet 0   • Multiple Vitamins-Minerals (HAIR/SKIN/NAILS PO) Take by mouth daily     • ascorbic acid (VITAMIN C) 500 MG tablet Take 1 tablet by mouth daily (Patient not taking: Reported on 7/16/2024)         Allergies:  No Known Allergies    Problem List:    Patient Active Problem List   Diagnosis Code   • CAD (coronary artery disease) I25.10   • Tobacco abuse Z72.0   • Hypertension I10   • Hypercholesterolemia E78.00   • History of thyroiditis Z86.39   • Insomnia G47.00   • Hydronephrosis with obstructing calculus N13.2   • Kidney stone N20.0   • Vitamin D deficiency E55.9       Past Medical History:        Diagnosis Date   • Abnormal Pap smear of cervix 1970    before her hysterectomy in 1972   • CAD (coronary artery disease)    • Chronic kidney disease    • COPD (chronic obstructive pulmonary disease) (HCC)    • Hemorrhoids    • History of herpes zoster 2002    (right lower rib area laterally and anteriorly).   • History of thyroiditis 1992   • History of ulcerative colitis     (of 20 years disease - though quiescent).   • Hypercholesterolemia    • Hypertension    • Neuritis    • Shortened MI interval     (on EKG from 2004).   • Tobacco abuse    • Venous insufficiency,

## 2024-10-29 NOTE — OP NOTE
COLONOSCOPY PROCEDURE NOTE:      Pre op diagnosis:       63 y.o.female  Last Colonoscopy 2016 - Dr Poon (can't find records but reports having a polyp)   No family history of colon cancer  Yes history of polyps unknown types  Positive cologaurd        Operative Procedure:    1.  Colonoscopy with hot forceps and hot snare    Surgeon:    Dr. Lewis Montilla     Anesthesia:    IV sedation per CRNA    EBL:  minimal    Procedure:    Patient was taken to the endoscopy suite and placed in a left lateral decubitus position.  They were given IV sedation as mentioned above.  A digital rectal exam was performed.  There were no masses and anal tone was normal.  The colonoscope was inserted into the rectum and carefully manipulated up through the sigmoid colon, transverse colon, right colon and into the cecum.  The cecum was identified by the ileal cecal valve and transabdominal illumination.  Then the scope was slowly withdrawn.  The scope was retroflexed in the rectum and the dentate line was examined.  The scope was removed.  The patient tolerated the procedure well.  The following findings were noted.        Final Diagnosis:    1 cm sessile polyp at 65 cm, removed with hot snare  5 mm polyp at right colon, removed with hot forceps  8 mm polyp at 15 cm, removed with hot forceps  5 mm polyp at 10 cm, removed with hot forceps    Plan:    Await bx  Probably repeat  CS in 3 years due to 3 or more polyps    ADDENDUM:  Endo Review :  11/1/2024    Pathology:    A. COLON POLYP AT 65 CM, BIOPSY:  Tubular adenoma.      B. RIGHT COLON POLYP, BIOPSY:  Benign mucosal polyp.      C. COLON POLYP AT 15 CM, BIOPSY:  Hyperplastic polyp.      D. COLON POLYP AT 10 CM, BIOPSY:  Hyperplastic polyp.     Plan:    1.  repeat  CS in 3 years due to 3 or more polyps

## 2024-10-31 LAB — SURGICAL PATHOLOGY REPORT: NORMAL

## 2024-11-04 ENCOUNTER — TELEPHONE (OUTPATIENT)
Dept: SURGERY | Age: 64
End: 2024-11-04

## 2024-11-04 NOTE — TELEPHONE ENCOUNTER
Letter created and mailed to patient with results from recent CS at [facility] with Dr. Montilla on 10/29/2024. Updated history, health maintenance, and recall. Forwarded results to PCP.

## 2024-11-19 DIAGNOSIS — I10 ESSENTIAL HYPERTENSION: ICD-10-CM

## 2024-11-19 RX ORDER — LISINOPRIL 10 MG/1
TABLET ORAL
Qty: 90 TABLET | Refills: 1 | Status: SHIPPED | OUTPATIENT
Start: 2024-11-19

## 2024-11-21 ENCOUNTER — TELEPHONE (OUTPATIENT)
Dept: FAMILY MEDICINE CLINIC | Age: 64
End: 2024-11-21

## 2024-12-20 ENCOUNTER — HOSPITAL ENCOUNTER (OUTPATIENT)
Age: 64
Setting detail: SPECIMEN
Discharge: HOME | End: 2024-12-20
Payer: COMMERCIAL

## 2024-12-20 ENCOUNTER — OFFICE VISIT (OUTPATIENT)
Dept: FAMILY MEDICINE CLINIC | Age: 64
End: 2024-12-20
Payer: COMMERCIAL

## 2024-12-20 VITALS
DIASTOLIC BLOOD PRESSURE: 80 MMHG | SYSTOLIC BLOOD PRESSURE: 132 MMHG | BODY MASS INDEX: 22.98 KG/M2 | HEIGHT: 64 IN | OXYGEN SATURATION: 97 % | HEART RATE: 88 BPM | TEMPERATURE: 99.4 F | WEIGHT: 134.6 LBS

## 2024-12-20 DIAGNOSIS — E78.2 MIXED HYPERLIPIDEMIA: ICD-10-CM

## 2024-12-20 DIAGNOSIS — J00 COMMON COLD: ICD-10-CM

## 2024-12-20 DIAGNOSIS — R35.0 URINARY FREQUENCY: ICD-10-CM

## 2024-12-20 DIAGNOSIS — I10 ESSENTIAL HYPERTENSION: Primary | ICD-10-CM

## 2024-12-20 LAB
BACTERIA URNS QL MICRO: ABNORMAL
BILIRUB UR QL STRIP: NEGATIVE
CLARITY UR: CLEAR
COLOR UR: YELLOW
EPI CELLS #/AREA URNS HPF: ABNORMAL /HPF (ref 0–5)
GLUCOSE UR STRIP-MCNC: NEGATIVE MG/DL
HGB UR QL STRIP.AUTO: NEGATIVE
KETONES UR STRIP-MCNC: NEGATIVE MG/DL
LEUKOCYTE ESTERASE UR QL STRIP: NEGATIVE
NITRITE UR QL STRIP: POSITIVE
PH UR STRIP: 6 [PH] (ref 5–6)
PROT UR STRIP-MCNC: NEGATIVE MG/DL
RBC #/AREA URNS HPF: ABNORMAL /HPF (ref 0–4)
SP GR UR STRIP: 1.03 (ref 1.01–1.02)
UROBILINOGEN UR STRIP-ACNC: NORMAL EU/DL (ref 0–1)
WBC #/AREA URNS HPF: ABNORMAL /HPF (ref 0–4)

## 2024-12-20 PROCEDURE — 87186 SC STD MICRODIL/AGAR DIL: CPT

## 2024-12-20 PROCEDURE — G8427 DOCREV CUR MEDS BY ELIG CLIN: HCPCS | Performed by: NURSE PRACTITIONER

## 2024-12-20 PROCEDURE — 87086 URINE CULTURE/COLONY COUNT: CPT

## 2024-12-20 PROCEDURE — 99214 OFFICE O/P EST MOD 30 MIN: CPT | Performed by: NURSE PRACTITIONER

## 2024-12-20 PROCEDURE — 3079F DIAST BP 80-89 MM HG: CPT | Performed by: NURSE PRACTITIONER

## 2024-12-20 PROCEDURE — 81015 MICROSCOPIC EXAM OF URINE: CPT

## 2024-12-20 PROCEDURE — G8484 FLU IMMUNIZE NO ADMIN: HCPCS | Performed by: NURSE PRACTITIONER

## 2024-12-20 PROCEDURE — 87088 URINE BACTERIA CULTURE: CPT

## 2024-12-20 PROCEDURE — G8420 CALC BMI NORM PARAMETERS: HCPCS | Performed by: NURSE PRACTITIONER

## 2024-12-20 PROCEDURE — 4004F PT TOBACCO SCREEN RCVD TLK: CPT | Performed by: NURSE PRACTITIONER

## 2024-12-20 PROCEDURE — 3075F SYST BP GE 130 - 139MM HG: CPT | Performed by: NURSE PRACTITIONER

## 2024-12-20 PROCEDURE — 3017F COLORECTAL CA SCREEN DOC REV: CPT | Performed by: NURSE PRACTITIONER

## 2024-12-20 RX ORDER — ATORVASTATIN CALCIUM 80 MG/1
80 TABLET, FILM COATED ORAL DAILY
Qty: 90 TABLET | Refills: 1 | Status: SHIPPED | OUTPATIENT
Start: 2024-12-20

## 2024-12-20 RX ORDER — CEPHALEXIN 500 MG/1
500 CAPSULE ORAL 2 TIMES DAILY
Qty: 14 CAPSULE | Refills: 0 | Status: SHIPPED | OUTPATIENT
Start: 2024-12-20 | End: 2024-12-27

## 2024-12-20 ASSESSMENT — ENCOUNTER SYMPTOMS
WHEEZING: 0
DIARRHEA: 0
CHEST TIGHTNESS: 0
VOMITING: 0
SORE THROAT: 0
SINUS PAIN: 0
COUGH: 1
ABDOMINAL DISTENTION: 0
ABDOMINAL PAIN: 0
SHORTNESS OF BREATH: 0
NAUSEA: 1
COLOR CHANGE: 0
CONSTIPATION: 0
RHINORRHEA: 1
SINUS PRESSURE: 0

## 2024-12-20 NOTE — PROGRESS NOTES
[facility] A DEPARTMENT OF [facility]  [address] [facility] [address]  Dept: [phone]  Dept Fax: [phone]  Loc: [phone]    Maggie Kaplan is a 64 y.o. female who presents today for her medical conditions/complaintsas noted below.  Maggie Kaplan is c/o of   Chief Complaint   Patient presents with    Urinary Frequency     Several days    Hypertension    Hyperlipidemia     HPI:     History of Present Illness  The patient is a 64-year-old female who presents to the office for hypertension and hyperlipidemia recheck. She also believes that she possibly has a UTI and cold.    She reports experiencing frequent urination, which she suspects may be indicative of a urinary tract infection. Additionally, she mentions mild dysuria, nausea, flank pain but has not experienced any fevers. She reports her symptoms have been going on for several days. She has not take anything for her symptoms.     She has a history of HTN. She is currently on antihypertensive medication, which she tolerates well.    She has not yet undergone a follow-up cholesterol test but plans to do so today. She is tolerating the increased dosage of her cholesterol medication without any adverse effects. She denies any chest pain, SOB, dizziness, edema.     She also reports experiencing flank pain and nausea. Initially, she attributed her symptoms to a sinus infection, but now she is uncertain. She has been experiencing nasal congestion and clear rhinorrhea. She does not have a sore throat or ear discharge. She has a history of chronic earaches due to 8 previous ear surgeries. She also reports a mild cough. She denies fever, headache, vomiting, diarrhea. She has not taken anything OTC for her symptoms.     Hemoglobin A1C (%)   Date Value   11/20/2023 5.4   01/11/2017 5.4             ( goal A1Cis < 7)   No components found for: \"LABMICR\"  No components found for:

## 2024-12-21 LAB
MICROORGANISM SPEC CULT: ABNORMAL
SERVICE CMNT-IMP: ABNORMAL
SPECIMEN DESCRIPTION: ABNORMAL

## 2025-04-02 DIAGNOSIS — M81.0 AGE-RELATED OSTEOPOROSIS WITHOUT CURRENT PATHOLOGICAL FRACTURE: ICD-10-CM

## 2025-04-02 DIAGNOSIS — M48.061 NARROWING OF LUMBAR SPINE: ICD-10-CM

## 2025-04-02 RX ORDER — ALENDRONATE SODIUM 70 MG/1
TABLET ORAL
Qty: 4 TABLET | Refills: 12 | Status: SHIPPED | OUTPATIENT
Start: 2025-04-02

## 2025-04-02 RX ORDER — IBUPROFEN 800 MG/1
TABLET, FILM COATED ORAL
Qty: 90 TABLET | Refills: 0 | Status: SHIPPED | OUTPATIENT
Start: 2025-04-02

## 2025-04-02 NOTE — TELEPHONE ENCOUNTER
Maggie called requesting a refill of the below medication which has been pended for you:     Requested Prescriptions     Pending Prescriptions Disp Refills    ibuprofen (ADVIL;MOTRIN) 800 MG tablet [Pharmacy Med Name: IBUPROFEN 800 MG TABLET] 90 tablet      Sig: TAKE 1 TABLET BY MOUTH 3 TIMES A DAY AS NEEDED FOR PAIN       Last Appointment Date: 12/20/2024  Next Appointment Date: 8/21/2025    No Known Allergies

## 2025-05-13 ENCOUNTER — OFFICE VISIT (OUTPATIENT)
Dept: PRIMARY CARE CLINIC | Age: 65
End: 2025-05-13
Payer: COMMERCIAL

## 2025-05-13 VITALS
HEIGHT: 64 IN | RESPIRATION RATE: 18 BRPM | DIASTOLIC BLOOD PRESSURE: 90 MMHG | BODY MASS INDEX: 23.22 KG/M2 | OXYGEN SATURATION: 98 % | TEMPERATURE: 97.9 F | WEIGHT: 136 LBS | HEART RATE: 78 BPM | SYSTOLIC BLOOD PRESSURE: 160 MMHG

## 2025-05-13 DIAGNOSIS — L84 CALLUS OF FOOT: ICD-10-CM

## 2025-05-13 DIAGNOSIS — B37.0 ORAL THRUSH: Primary | ICD-10-CM

## 2025-05-13 DIAGNOSIS — E55.9 VITAMIN D DEFICIENCY: ICD-10-CM

## 2025-05-13 PROCEDURE — G8420 CALC BMI NORM PARAMETERS: HCPCS | Performed by: FAMILY MEDICINE

## 2025-05-13 PROCEDURE — 3017F COLORECTAL CA SCREEN DOC REV: CPT | Performed by: FAMILY MEDICINE

## 2025-05-13 PROCEDURE — 4004F PT TOBACCO SCREEN RCVD TLK: CPT | Performed by: FAMILY MEDICINE

## 2025-05-13 PROCEDURE — G8427 DOCREV CUR MEDS BY ELIG CLIN: HCPCS | Performed by: FAMILY MEDICINE

## 2025-05-13 PROCEDURE — 3077F SYST BP >= 140 MM HG: CPT | Performed by: FAMILY MEDICINE

## 2025-05-13 PROCEDURE — 99213 OFFICE O/P EST LOW 20 MIN: CPT | Performed by: FAMILY MEDICINE

## 2025-05-13 PROCEDURE — 3080F DIAST BP >= 90 MM HG: CPT | Performed by: FAMILY MEDICINE

## 2025-05-13 RX ORDER — FLUCONAZOLE 100 MG/1
TABLET ORAL
Qty: 8 TABLET | Refills: 0 | Status: SHIPPED | OUTPATIENT
Start: 2025-05-13

## 2025-05-13 NOTE — PROGRESS NOTES
2025     Maggie Kaplan (:  1960) is a 64 y.o. female, here for evaluation of the following medical concerns:    Oral Pain       History of Present Illness  The patient is a 64-year-old female who presents for evaluation of tongue redness and pain for the past 2 to 3 days. She reports experiencing discomfort in her tongue, which she initially believed would resolve spontaneously. She has not been on any recent antibiotic therapy or inhaler use.    Additionally, she mentions persistent foot pain, which she attributes to callus formation. She acknowledges the need for better footwear with adequate padding.    Did review patient's med list, allergies, social history, fam history, pmhx and pshx today as noted in the record.      Review of Systems   HENT:  Positive for mouth sores (tongue is raw and burning).    Musculoskeletal:  Positive for arthralgias (pain to the plantar apsect of the right foot over the 1st MTP and to the medial aspect of the great toe).       Prior to Visit Medications    Medication Sig Taking? Authorizing Provider   fluconazole (DIFLUCAN) 100 MG tablet Take 2 tabs on day 1, then 1 tab daily days 2-7 Yes Lola Ho W,    alendronate (FOSAMAX) 70 MG tablet TAKE 1 TABLET BY MOUTH ONCE WEEKLY ON AN EMPTY STOMACH BEFORE BREAKFAST. REMAIN UPRIGHT FOR 30 MINUTES AND TAKE WITH 8 OUNCES OF WATER Yes Veronica Leroy APRN - LAURA   ibuprofen (ADVIL;MOTRIN) 800 MG tablet TAKE 1 TABLET BY MOUTH 3 TIMES A DAY AS NEEDED FOR PAIN Yes Trenton Camacho MD   atorvastatin (LIPITOR) 80 MG tablet Take 1 tablet by mouth daily Yes Teagan Delaney APRN - CNP   lisinopril (PRINIVIL;ZESTRIL) 10 MG tablet TAKE 1 TABLET BY MOUTH DAILY Yes Teagan Delaney APRN - CNP   cyclobenzaprine (FLEXERIL) 10 MG tablet take 1 tablet by mouth twice a day if needed for muscle spasm Yes Teagan Delaney APRN - CNP   vitamin D (ERGOCALCIFEROL) 1.25 MG (24714 UT) CAPS capsule take 1 capsule by mouth

## 2025-05-15 RX ORDER — ERGOCALCIFEROL 1.25 MG/1
50000 CAPSULE, LIQUID FILLED ORAL WEEKLY
Qty: 4 CAPSULE | Refills: 4 | Status: SHIPPED | OUTPATIENT
Start: 2025-05-15

## 2025-05-29 ENCOUNTER — APPOINTMENT (OUTPATIENT)
Dept: GENERAL RADIOLOGY | Age: 65
DRG: 305 | End: 2025-05-29
Payer: COMMERCIAL

## 2025-05-29 ENCOUNTER — OFFICE VISIT (OUTPATIENT)
Dept: PRIMARY CARE CLINIC | Age: 65
End: 2025-05-29

## 2025-05-29 ENCOUNTER — APPOINTMENT (OUTPATIENT)
Dept: CT IMAGING | Age: 65
DRG: 305 | End: 2025-05-29
Payer: COMMERCIAL

## 2025-05-29 ENCOUNTER — HOSPITAL ENCOUNTER (INPATIENT)
Age: 65
LOS: 2 days | Discharge: HOME | DRG: 305 | End: 2025-05-31
Attending: EMERGENCY MEDICINE | Admitting: INTERNAL MEDICINE
Payer: COMMERCIAL

## 2025-05-29 VITALS
SYSTOLIC BLOOD PRESSURE: 266 MMHG | OXYGEN SATURATION: 98 % | BODY MASS INDEX: 22.88 KG/M2 | HEART RATE: 73 BPM | HEIGHT: 64 IN | DIASTOLIC BLOOD PRESSURE: 127 MMHG | TEMPERATURE: 98.8 F | WEIGHT: 134 LBS

## 2025-05-29 DIAGNOSIS — R51.9 ACUTE NONINTRACTABLE HEADACHE, UNSPECIFIED HEADACHE TYPE: Primary | ICD-10-CM

## 2025-05-29 DIAGNOSIS — I16.0 HYPERTENSIVE URGENCY: Primary | ICD-10-CM

## 2025-05-29 LAB
ANION GAP SERPL CALCULATED.3IONS-SCNC: 13 MMOL/L (ref 9–16)
BASOPHILS # BLD: 0.06 K/UL (ref 0–0.2)
BASOPHILS NFR BLD: 1 % (ref 0–2)
BILIRUB UR QL STRIP: NEGATIVE
BUN SERPL-MCNC: 18 MG/DL (ref 8–23)
BUN/CREAT SERPL: 20 (ref 9–20)
CALCIUM SERPL-MCNC: 9.6 MG/DL (ref 8.6–10.4)
CHLORIDE SERPL-SCNC: 105 MMOL/L (ref 98–107)
CLARITY UR: CLEAR
CO2 SERPL-SCNC: 24 MMOL/L (ref 20–31)
COLOR UR: YELLOW
COMMENT: ABNORMAL
CREAT SERPL-MCNC: 0.9 MG/DL (ref 0.6–0.9)
EKG ATRIAL RATE: 60 BPM
EKG P AXIS: 58 DEGREES
EKG P-R INTERVAL: 106 MS
EKG Q-T INTERVAL: 434 MS
EKG QRS DURATION: 78 MS
EKG QTC CALCULATION (BAZETT): 434 MS
EKG R AXIS: 75 DEGREES
EKG T AXIS: 64 DEGREES
EKG VENTRICULAR RATE: 60 BPM
EOSINOPHIL # BLD: 0.06 K/UL (ref 0–0.44)
EOSINOPHILS RELATIVE PERCENT: 1 % (ref 1–4)
ERYTHROCYTE [DISTWIDTH] IN BLOOD BY AUTOMATED COUNT: 13.1 % (ref 11.8–14.4)
GFR, ESTIMATED: 72 ML/MIN/1.73M2
GLUCOSE SERPL-MCNC: 118 MG/DL (ref 74–99)
GLUCOSE UR STRIP-MCNC: NEGATIVE MG/DL
HCT VFR BLD AUTO: 42.4 % (ref 36.3–47.1)
HGB BLD-MCNC: 14.5 G/DL (ref 11.9–15.1)
HGB UR QL STRIP.AUTO: NEGATIVE
IMM GRANULOCYTES # BLD AUTO: 0.04 K/UL (ref 0–0.3)
IMM GRANULOCYTES NFR BLD: 0 %
KETONES UR STRIP-MCNC: NEGATIVE MG/DL
LEUKOCYTE ESTERASE UR QL STRIP: NEGATIVE
LYMPHOCYTES NFR BLD: 1.15 K/UL (ref 1.1–3.7)
LYMPHOCYTES RELATIVE PERCENT: 11 % (ref 24–43)
MCH RBC QN AUTO: 30.7 PG (ref 25.2–33.5)
MCHC RBC AUTO-ENTMCNC: 34.2 G/DL (ref 25.2–33.5)
MCV RBC AUTO: 89.8 FL (ref 82.6–102.9)
MONOCYTES NFR BLD: 0.56 K/UL (ref 0.1–1.2)
MONOCYTES NFR BLD: 6 % (ref 3–12)
NEUTROPHILS NFR BLD: 81 % (ref 36–65)
NEUTS SEG NFR BLD: 8.31 K/UL (ref 1.5–8.1)
NITRITE UR QL STRIP: NEGATIVE
NRBC BLD-RTO: 0 PER 100 WBC
PH UR STRIP: 7 [PH] (ref 5–6)
PLATELET # BLD AUTO: 270 K/UL (ref 138–453)
PMV BLD AUTO: 9.7 FL (ref 8.1–13.5)
POTASSIUM SERPL-SCNC: 4 MMOL/L (ref 3.7–5.3)
PROT UR STRIP-MCNC: NEGATIVE MG/DL
RBC # BLD AUTO: 4.72 M/UL (ref 3.95–5.11)
SODIUM SERPL-SCNC: 142 MMOL/L (ref 136–145)
SP GR UR STRIP: 1.01 (ref 1.01–1.02)
TROPONIN I SERPL HS-MCNC: 7 NG/L (ref 0–14)
UROBILINOGEN UR STRIP-ACNC: NORMAL EU/DL (ref 0–1)
WBC OTHER # BLD: 10.2 K/UL (ref 3.5–11.3)

## 2025-05-29 PROCEDURE — 93005 ELECTROCARDIOGRAM TRACING: CPT | Performed by: EMERGENCY MEDICINE

## 2025-05-29 PROCEDURE — 96376 TX/PRO/DX INJ SAME DRUG ADON: CPT

## 2025-05-29 PROCEDURE — 3077F SYST BP >= 140 MM HG: CPT

## 2025-05-29 PROCEDURE — 6360000002 HC RX W HCPCS

## 2025-05-29 PROCEDURE — 3017F COLORECTAL CA SCREEN DOC REV: CPT

## 2025-05-29 PROCEDURE — G8420 CALC BMI NORM PARAMETERS: HCPCS

## 2025-05-29 PROCEDURE — 2060000000 HC ICU INTERMEDIATE R&B

## 2025-05-29 PROCEDURE — 81003 URINALYSIS AUTO W/O SCOPE: CPT

## 2025-05-29 PROCEDURE — 70450 CT HEAD/BRAIN W/O DYE: CPT

## 2025-05-29 PROCEDURE — 6360000002 HC RX W HCPCS: Performed by: EMERGENCY MEDICINE

## 2025-05-29 PROCEDURE — 80048 BASIC METABOLIC PNL TOTAL CA: CPT

## 2025-05-29 PROCEDURE — 84484 ASSAY OF TROPONIN QUANT: CPT

## 2025-05-29 PROCEDURE — 96375 TX/PRO/DX INJ NEW DRUG ADDON: CPT

## 2025-05-29 PROCEDURE — 99222 1ST HOSP IP/OBS MODERATE 55: CPT

## 2025-05-29 PROCEDURE — 99285 EMERGENCY DEPT VISIT HI MDM: CPT

## 2025-05-29 PROCEDURE — G8427 DOCREV CUR MEDS BY ELIG CLIN: HCPCS

## 2025-05-29 PROCEDURE — 36415 COLL VENOUS BLD VENIPUNCTURE: CPT

## 2025-05-29 PROCEDURE — 2580000003 HC RX 258: Performed by: EMERGENCY MEDICINE

## 2025-05-29 PROCEDURE — 4004F PT TOBACCO SCREEN RCVD TLK: CPT

## 2025-05-29 PROCEDURE — 96374 THER/PROPH/DIAG INJ IV PUSH: CPT

## 2025-05-29 PROCEDURE — 6360000002 HC RX W HCPCS: Performed by: STUDENT IN AN ORGANIZED HEALTH CARE EDUCATION/TRAINING PROGRAM

## 2025-05-29 PROCEDURE — 3080F DIAST BP >= 90 MM HG: CPT

## 2025-05-29 PROCEDURE — 85025 COMPLETE CBC W/AUTO DIFF WBC: CPT

## 2025-05-29 PROCEDURE — 2580000003 HC RX 258

## 2025-05-29 PROCEDURE — 71045 X-RAY EXAM CHEST 1 VIEW: CPT

## 2025-05-29 PROCEDURE — 2000000000 HC ICU R&B

## 2025-05-29 RX ORDER — SODIUM CHLORIDE 0.9 % (FLUSH) 0.9 %
5-40 SYRINGE (ML) INJECTION EVERY 12 HOURS SCHEDULED
Status: DISCONTINUED | OUTPATIENT
Start: 2025-05-29 | End: 2025-05-31 | Stop reason: HOSPADM

## 2025-05-29 RX ORDER — LABETALOL HYDROCHLORIDE 5 MG/ML
20 INJECTION, SOLUTION INTRAVENOUS ONCE
Status: COMPLETED | OUTPATIENT
Start: 2025-05-29 | End: 2025-05-29

## 2025-05-29 RX ORDER — MULTIVIT-MIN/FOLIC ACID/BIOTIN 66.7 MCG
1 TABLET ORAL DAILY
Status: DISCONTINUED | OUTPATIENT
Start: 2025-05-30 | End: 2025-05-29

## 2025-05-29 RX ORDER — LISINOPRIL 5 MG/1
10 TABLET ORAL DAILY
Status: DISCONTINUED | OUTPATIENT
Start: 2025-05-30 | End: 2025-05-30

## 2025-05-29 RX ORDER — POLYETHYLENE GLYCOL 3350 17 G/17G
17 POWDER, FOR SOLUTION ORAL DAILY PRN
Status: DISCONTINUED | OUTPATIENT
Start: 2025-05-29 | End: 2025-05-31 | Stop reason: HOSPADM

## 2025-05-29 RX ORDER — SODIUM CHLORIDE 9 MG/ML
INJECTION, SOLUTION INTRAVENOUS PRN
Status: DISCONTINUED | OUTPATIENT
Start: 2025-05-29 | End: 2025-05-31 | Stop reason: HOSPADM

## 2025-05-29 RX ORDER — ACETAMINOPHEN 650 MG/1
650 SUPPOSITORY RECTAL EVERY 6 HOURS PRN
Status: DISCONTINUED | OUTPATIENT
Start: 2025-05-29 | End: 2025-05-31 | Stop reason: HOSPADM

## 2025-05-29 RX ORDER — IBUPROFEN 600 MG/1
600 TABLET, FILM COATED ORAL 3 TIMES DAILY PRN
Status: DISCONTINUED | OUTPATIENT
Start: 2025-05-29 | End: 2025-05-30

## 2025-05-29 RX ORDER — ONDANSETRON 2 MG/ML
4 INJECTION INTRAMUSCULAR; INTRAVENOUS EVERY 6 HOURS PRN
Status: DISCONTINUED | OUTPATIENT
Start: 2025-05-29 | End: 2025-05-31 | Stop reason: HOSPADM

## 2025-05-29 RX ORDER — CYCLOBENZAPRINE HCL 10 MG
10 TABLET ORAL 2 TIMES DAILY PRN
Status: DISCONTINUED | OUTPATIENT
Start: 2025-05-29 | End: 2025-05-31 | Stop reason: HOSPADM

## 2025-05-29 RX ORDER — NICOTINE 21 MG/24HR
1 PATCH, TRANSDERMAL 24 HOURS TRANSDERMAL DAILY
Status: DISCONTINUED | OUTPATIENT
Start: 2025-05-29 | End: 2025-05-31 | Stop reason: HOSPADM

## 2025-05-29 RX ORDER — ACETAMINOPHEN 325 MG/1
650 TABLET ORAL EVERY 6 HOURS PRN
Status: DISCONTINUED | OUTPATIENT
Start: 2025-05-29 | End: 2025-05-31 | Stop reason: HOSPADM

## 2025-05-29 RX ORDER — SODIUM CHLORIDE 0.9 % (FLUSH) 0.9 %
5-40 SYRINGE (ML) INJECTION PRN
Status: DISCONTINUED | OUTPATIENT
Start: 2025-05-29 | End: 2025-05-31 | Stop reason: HOSPADM

## 2025-05-29 RX ORDER — MORPHINE SULFATE 4 MG/ML
4 INJECTION, SOLUTION INTRAMUSCULAR; INTRAVENOUS ONCE
Refills: 0 | Status: COMPLETED | OUTPATIENT
Start: 2025-05-29 | End: 2025-05-29

## 2025-05-29 RX ORDER — SODIUM CHLORIDE 9 MG/ML
INJECTION, SOLUTION INTRAVENOUS CONTINUOUS
Status: DISCONTINUED | OUTPATIENT
Start: 2025-05-29 | End: 2025-05-30

## 2025-05-29 RX ORDER — ATORVASTATIN CALCIUM 40 MG/1
80 TABLET, FILM COATED ORAL DAILY
Status: DISCONTINUED | OUTPATIENT
Start: 2025-05-30 | End: 2025-05-31 | Stop reason: HOSPADM

## 2025-05-29 RX ORDER — ONDANSETRON 4 MG/1
4 TABLET, ORALLY DISINTEGRATING ORAL EVERY 8 HOURS PRN
Status: DISCONTINUED | OUTPATIENT
Start: 2025-05-29 | End: 2025-05-31 | Stop reason: HOSPADM

## 2025-05-29 RX ADMIN — LABETALOL HYDROCHLORIDE 20 MG: 5 INJECTION, SOLUTION INTRAVENOUS at 17:21

## 2025-05-29 RX ADMIN — SODIUM CHLORIDE: 0.9 INJECTION, SOLUTION INTRAVENOUS at 21:05

## 2025-05-29 RX ADMIN — LABETALOL HYDROCHLORIDE 1 MG/MIN: 5 INJECTION, SOLUTION INTRAVENOUS at 17:45

## 2025-05-29 RX ADMIN — LABETALOL HYDROCHLORIDE 20 MG: 5 INJECTION, SOLUTION INTRAVENOUS at 16:22

## 2025-05-29 RX ADMIN — ONDANSETRON 4 MG: 2 INJECTION, SOLUTION INTRAMUSCULAR; INTRAVENOUS at 21:01

## 2025-05-29 RX ADMIN — MORPHINE SULFATE 4 MG: 4 INJECTION, SOLUTION INTRAMUSCULAR; INTRAVENOUS at 19:49

## 2025-05-29 ASSESSMENT — PATIENT HEALTH QUESTIONNAIRE - PHQ9
2. FEELING DOWN, DEPRESSED OR HOPELESS: NOT AT ALL
SUM OF ALL RESPONSES TO PHQ QUESTIONS 1-9: 0
1. LITTLE INTEREST OR PLEASURE IN DOING THINGS: NOT AT ALL
SUM OF ALL RESPONSES TO PHQ QUESTIONS 1-9: 0

## 2025-05-29 ASSESSMENT — PAIN DESCRIPTION - ORIENTATION: ORIENTATION: ANTERIOR

## 2025-05-29 ASSESSMENT — PAIN DESCRIPTION - LOCATION: LOCATION: HEAD

## 2025-05-29 ASSESSMENT — PAIN DESCRIPTION - FREQUENCY: FREQUENCY: CONTINUOUS

## 2025-05-29 ASSESSMENT — PAIN SCALES - GENERAL
PAINLEVEL_OUTOF10: 9
PAINLEVEL_OUTOF10: 8

## 2025-05-29 ASSESSMENT — LIFESTYLE VARIABLES
HOW MANY STANDARD DRINKS CONTAINING ALCOHOL DO YOU HAVE ON A TYPICAL DAY: PATIENT DOES NOT DRINK
HOW OFTEN DO YOU HAVE A DRINK CONTAINING ALCOHOL: NEVER

## 2025-05-29 ASSESSMENT — PAIN - FUNCTIONAL ASSESSMENT: PAIN_FUNCTIONAL_ASSESSMENT: 0-10

## 2025-05-29 ASSESSMENT — PAIN DESCRIPTION - PAIN TYPE: TYPE: ACUTE PAIN

## 2025-05-29 NOTE — ED NOTES
ED Report    Presents to ED from: Home    Chief Complaint   Patient presents with    Headache    Hypertension    Ear Pain     bilateral     1. Hypertensive urgency      Present Condition: Stable  Pertinent Event(s): Hypertensive crisis and severe headache    LOC:  A+O x 4  Code Status: FULL    Vital Signs   Vitals:    05/29/25 1800 05/29/25 1812 05/29/25 1827 05/29/25 1842   BP: (!) 228/75 (!) 199/76 (!) 192/64 (!) 190/67   Pulse: 58 60 61 61   Resp: 17 18 17 21   Temp:       SpO2: 98% 97% 97% 98%   Weight:       Height:         Oxygen Baseline: Room Air       Current Oxygen Needs: Room Air  Mobility: Independent       Mobility Aide: Independent    LDAs:   Peripheral IV 05/29/25 Right Antecubital (Active)   Site Assessment Clean, dry & intact 05/29/25 1628   Line Status Normal saline locked 05/29/25 1628   Phlebitis Assessment No symptoms 05/29/25 1628   Infiltration Assessment 0 05/29/25 1628   Alcohol Cap Used Yes 05/29/25 1628   Dressing Status Clean, dry & intact 05/29/25 1628   Dressing Type Transparent 05/29/25 1628     Abnormal ED Labs:    Labs Reviewed   CBC WITH AUTO DIFFERENTIAL - Abnormal; Notable for the following components:       Result Value    MCHC 34.2 (*)     Neutrophils % 81 (*)     Lymphocytes % 11 (*)     Neutrophils Absolute 8.31 (*)     All other components within normal limits   BASIC METABOLIC PANEL - Abnormal; Notable for the following components:    Glucose 118 (*)     All other components within normal limits   URINALYSIS WITH REFLEX TO CULTURE - Abnormal; Notable for the following components:    pH, Urine 7.0 (*)     All other components within normal limits   TROPONIN     Imaging:    XR CHEST PORTABLE   Final Result   No acute cardiopulmonary disease.         CT HEAD WO CONTRAST   Preliminary Result   1. No acute intracranial abnormality.   2. Mild age-appropriate atrophy and small-vessel disease ischemic changes.   3. Left mastoid effusion.           Medications Administered          labetalol (NORMODYNE;TRANDATE) 300 mg in sodium chloride 0.9 % 300 mL infusion Admin Date  05/29/2025 Action  New Bag Dose  1 mg/min Rate  60 mL/hr Route  IntraVENous Documented By  Delfin Weaver RN        labetalol (NORMODYNE;TRANDATE) injection 20 mg Admin Date  05/29/2025 Action  Given Dose  20 mg Rate   Route  IntraVENous Documented By  Delfin Weaver RN        labetalol (NORMODYNE;TRANDATE) injection 20 mg Admin Date  05/29/2025 Action  Given Dose  20 mg Rate   Route  IntraVENous Documented By  Delfin Weaver RN          Medication Reconciliation Completed: Yes      Consults:    [x] Hospitalist  Completed     [x] yes     [] no    [] Cardiology  Completed     [] yes     [] no    [] General Surgery  Completed     [] yes     [] no                 [] Orthopedics  Completed     [] yes     [] no    [] OB/GYN   Completed     [] yes     [] no    [] Oncology   Completed     [] yes     [] no    [] Neurology   Completed     [] yes     [] no    [] Podiatry   Completed     [] yes     [] no    [] Urology   Completed     [] yes     [] no         Electronically signed by Delfin Weaver RN on 5/29/2025 at 7:40 PM

## 2025-05-29 NOTE — PROGRESS NOTES
[facility], [facility]  [address] [facility] [address]  Dept: [phone]  Dept Fax: [phone]    Maggie Kaplan is a 64 y.o. female who presents today for her medical conditions/complaints as noted below. Maggie Kaplan is c/o of   Chief Complaint   Patient presents with    Migraine     SUZETTE ear pain sx began 12 pm    .    HPI:     HPI  I discussed with Maggie that due to her elevated blood pressure and headache, I thought it would be best for her to be evaluated at the emergency room.  Maggie was in agreement, and she   was transported [facility] to [facility].    Past Medical History:   Diagnosis Date    Abnormal Pap smear of cervix 1970    before her hysterectomy in 1972    CAD (coronary artery disease)     Chronic kidney disease     COPD (chronic obstructive pulmonary disease) (HCC)     Hemorrhoids     History of herpes zoster 2002    (right lower rib area laterally and anteriorly).    History of thyroiditis 1992    History of ulcerative colitis     (of 20 years disease - though quiescent).    Hypercholesterolemia     Hypertension     Neuritis     Shortened [place] interval     (on EKG from 2004).    Tobacco abuse     Venous insufficiency, peripheral     (bilateral).     Past Surgical History:   Procedure Laterality Date    APPENDECTOMY  1982    (Dr. UZMA Krause) - Acute appendicitis.    CARDIAC CATHETERIZATION  2000    Preserved left ventricular systolic function. Ejection fraction 50%. Noncritical coronary disease.  (Dr. Tomas).    CHOLECYSTECTOMY  2001    COLONOSCOPY  1988    (Dr. ELIEL Krause) - Normal colon.    COLONOSCOPY  05/12/2016    Polyp distal sigmoid, rectum - Dr Poon    COLONOSCOPY N/A 10/29/2024    hyperplastic x2, tubular x1, benign mucosal polyp x1; Dr sahu at [facility]    CYSTOSCOPY Left 08/05/2019    CYSTO Left Stent Insertion performed by

## 2025-05-29 NOTE — H&P
Place, to Situation, No Localizing Signs/Symptoms, follows commands with all 4 extremities, and Strength equal bilaterally  Psychiatric: Normal mood and affect      LABS:    CBC with Differential:    Lab Results   Component Value Date/Time    WBC 10.2 05/29/2025 04:18 PM    RBC 4.72 05/29/2025 04:18 PM    HGB 14.5 05/29/2025 04:18 PM    HCT 42.4 05/29/2025 04:18 PM     05/29/2025 04:18 PM    MCV 89.8 05/29/2025 04:18 PM    MCH 30.7 05/29/2025 04:18 PM    MCHC 34.2 05/29/2025 04:18 PM    RDW 13.1 05/29/2025 04:18 PM    LYMPHOPCT 11 05/29/2025 04:18 PM    MONOPCT 6 05/29/2025 04:18 PM    EOSPCT 1 05/29/2025 04:18 PM    BASOPCT 1 05/29/2025 04:18 PM    MONOSABS 0.56 05/29/2025 04:18 PM    LYMPHSABS 1.15 05/29/2025 04:18 PM    EOSABS 0.06 05/29/2025 04:18 PM    BASOSABS 0.06 05/29/2025 04:18 PM    DIFFTYPE NOT REPORTED 02/06/2022 09:40 AM     BMP:    Lab Results   Component Value Date/Time     05/29/2025 04:18 PM    K 4.0 05/29/2025 04:18 PM     05/29/2025 04:18 PM    CO2 24 05/29/2025 04:18 PM    BUN 18 05/29/2025 04:18 PM    CREATININE 0.9 05/29/2025 04:18 PM    CALCIUM 9.6 05/29/2025 04:18 PM    GFRAA 53 09/19/2022 02:03 PM    LABGLOM 72 05/29/2025 04:18 PM    LABGLOM 57 11/20/2023 09:28 AM    GLUCOSE 118 05/29/2025 04:18 PM     U/A:    Lab Results   Component Value Date/Time    COLORU Yellow 05/29/2025 05:50 PM    PROTEINU NEGATIVE 05/29/2025 05:50 PM    PHUR 7.0 05/29/2025 05:50 PM    PHUR 6.0 03/14/2024 12:40 PM    WBCUA 2 TO 5 12/20/2024 08:35 AM    RBCUA 2 TO 5 12/20/2024 08:35 AM    MUCUS NOT REPORTED 02/06/2022 10:15 AM    TRICHOMONAS NOT REPORTED 02/06/2022 10:15 AM    YEAST NOT REPORTED 02/06/2022 10:15 AM    BACTERIA MODERATE 12/20/2024 08:35 AM    LEUKOCYTESUR NEGATIVE 05/29/2025 05:50 PM    UROBILINOGEN Normal 05/29/2025 05:50 PM    BILIRUBINUR NEGATIVE 05/29/2025 05:50 PM    GLUCOSEU NEGATIVE 05/29/2025 05:50 PM    AMORPHOUS NOT REPORTED 02/06/2022 10:15 AM     Troponin HS

## 2025-05-29 NOTE — ED PROVIDER NOTES
[facility] Emergency Department  EMERGENCY DEPARTMENT ENCOUNTER      Pt Name: Maggie Kaplan  MRN: [id number]  Birthdate 1960  Date of evaluation: 5/29/2025    CHIEF COMPLAINT       Chief Complaint   Patient presents with    Headache    Hypertension    Ear Pain     bilateral        HISTORY OF PRESENT ILLNESS      Maggie Kaplan is a 64 y.o. female who presents to the emerged arm complaining of headache high blood pressure and bilateral ear pain.  Sent by urgent care for hypertension.  Patient states that she developed a headache yesterday and it got much worse today.  She denies any blurry vision or double vision.  She denies any chest pain or shortness of breath.  No significant cardiac history with exception of hypertension for which she takes lisinopril.  She has been taking her medications like she is supposed to.  She rates her headache 9 out of 10.  She denies any other relevant symptoms.     REVIEW OF SYSTEMS         Constitutional: No fevers or chills   HENT: No sore throat, rhinorrhea, positive earache   Eyes: No blurry vision or double vision no drainage   Cardiovascular: No chest pain or tachycardia   Respiratory: No wheezing or shortness of breath no cough   Gastrointestinal: No nausea, vomiting, diarrhea, constipation, or abdominal pain   : No hematuria or dysuria   Musculoskeletal: No swelling or pain   Skin: No rash   Neurological: No focal neurologic complaints, paresthesias, weakness, positive headache        PAST MEDICAL HISTORY    has a past medical history of Abnormal Pap smear of cervix, CAD (coronary artery disease), Chronic kidney disease, COPD (chronic obstructive pulmonary disease) (HCC), Hemorrhoids, History of herpes zoster, History of thyroiditis, History of ulcerative colitis, Hypercholesterolemia, Hypertension, Neuritis, Shortened [place] interval, Tobacco abuse, and Venous insufficiency, peripheral.    SURGICAL HISTORY      has a past surgical history that includes Cardiac

## 2025-05-30 ENCOUNTER — APPOINTMENT (OUTPATIENT)
Age: 65
DRG: 305 | End: 2025-05-30
Payer: COMMERCIAL

## 2025-05-30 PROBLEM — E78.2 MIXED HYPERLIPIDEMIA: Status: ACTIVE | Noted: 2025-05-30

## 2025-05-30 PROBLEM — F17.200 SMOKING ADDICTION: Status: ACTIVE | Noted: 2025-05-30

## 2025-05-30 LAB
ANION GAP SERPL CALCULATED.3IONS-SCNC: 10 MMOL/L (ref 9–16)
BASOPHILS # BLD: 0.03 K/UL (ref 0–0.2)
BASOPHILS NFR BLD: 0 % (ref 0–2)
BUN SERPL-MCNC: 17 MG/DL (ref 8–23)
BUN/CREAT SERPL: 21 (ref 9–20)
CALCIUM SERPL-MCNC: 9.2 MG/DL (ref 8.6–10.4)
CHLORIDE SERPL-SCNC: 108 MMOL/L (ref 98–107)
CHOLEST SERPL-MCNC: 191 MG/DL (ref 0–199)
CHOLESTEROL/HDL RATIO: 4.3
CO2 SERPL-SCNC: 24 MMOL/L (ref 20–31)
CREAT SERPL-MCNC: 0.8 MG/DL (ref 0.6–0.9)
ECHO AO ROOT DIAM: 2.9 CM
ECHO AO ROOT INDEX: 1.75 CM/M2
ECHO AV AREA PEAK VELOCITY: 1.8 CM2
ECHO AV AREA/BSA PEAK VELOCITY: 1.1 CM2/M2
ECHO AV PEAK GRADIENT: 7 MMHG
ECHO AV PEAK VELOCITY: 1.3 M/S
ECHO AV VELOCITY RATIO: 0.92
ECHO BSA: 1.67 M2
ECHO EST RA PRESSURE: 3 MMHG
ECHO IVC PROX: 1.5 CM
ECHO LA AREA 2C: 12.6 CM2
ECHO LA AREA 4C: 13.8 CM2
ECHO LA DIAMETER INDEX: 1.87 CM/M2
ECHO LA DIAMETER: 3.1 CM
ECHO LA MAJOR AXIS: 4.8 CM
ECHO LA MINOR AXIS: 4 CM
ECHO LA TO AORTIC ROOT RATIO: 1.07
ECHO LA VOL BP: 34 ML (ref 22–52)
ECHO LA VOL MOD A2C: 33 ML (ref 22–52)
ECHO LA VOL MOD A4C: 29 ML (ref 22–52)
ECHO LA VOL/BSA BIPLANE: 20 ML/M2 (ref 16–34)
ECHO LA VOLUME INDEX MOD A2C: 20 ML/M2 (ref 16–34)
ECHO LA VOLUME INDEX MOD A4C: 17 ML/M2 (ref 16–34)
ECHO LV E' LATERAL VELOCITY: 7.07 CM/S
ECHO LV E' SEPTAL VELOCITY: 7.4 CM/S
ECHO LV EF PHYSICIAN: 60 %
ECHO LV FRACTIONAL SHORTENING: 37 % (ref 28–44)
ECHO LV INTERNAL DIMENSION DIASTOLE INDEX: 2.11 CM/M2
ECHO LV INTERNAL DIMENSION DIASTOLIC: 3.5 CM (ref 3.9–5.3)
ECHO LV INTERNAL DIMENSION SYSTOLIC INDEX: 1.33 CM/M2
ECHO LV INTERNAL DIMENSION SYSTOLIC: 2.2 CM
ECHO LV IVSD: 1.1 CM (ref 0.6–0.9)
ECHO LV MASS 2D: 103.4 G (ref 67–162)
ECHO LV MASS INDEX 2D: 62.3 G/M2 (ref 43–95)
ECHO LV POSTERIOR WALL DIASTOLIC: 0.9 CM (ref 0.6–0.9)
ECHO LV RELATIVE WALL THICKNESS RATIO: 0.51
ECHO LVOT AREA: 2 CM2
ECHO LVOT DIAM: 1.6 CM
ECHO LVOT MEAN GRADIENT: 2 MMHG
ECHO LVOT PEAK GRADIENT: 6 MMHG
ECHO LVOT PEAK VELOCITY: 1.2 M/S
ECHO LVOT STROKE VOLUME INDEX: 27.7 ML/M2
ECHO LVOT SV: 46 ML
ECHO LVOT VTI: 22.9 CM
ECHO MV A VELOCITY: 0.99 M/S
ECHO MV E DECELERATION TIME (DT): 222 MS
ECHO MV E VELOCITY: 0.69 M/S
ECHO MV E/A RATIO: 0.7
ECHO MV E/E' LATERAL: 9.76
ECHO MV E/E' RATIO (AVERAGED): 9.54
ECHO MV E/E' SEPTAL: 9.32
ECHO RIGHT VENTRICULAR SYSTOLIC PRESSURE (RVSP): 21 MMHG
ECHO RV TAPSE: 2 CM (ref 1.7–?)
ECHO TV REGURGITANT MAX VELOCITY: 2.12 M/S
ECHO TV REGURGITANT PEAK GRADIENT: 18 MMHG
EKG ATRIAL RATE: 48 BPM
EKG P AXIS: 57 DEGREES
EKG P-R INTERVAL: 110 MS
EKG Q-T INTERVAL: 530 MS
EKG QRS DURATION: 84 MS
EKG QTC CALCULATION (BAZETT): 473 MS
EKG R AXIS: 73 DEGREES
EKG T AXIS: 69 DEGREES
EKG VENTRICULAR RATE: 48 BPM
EOSINOPHIL # BLD: 0.09 K/UL (ref 0–0.44)
EOSINOPHILS RELATIVE PERCENT: 1 % (ref 1–4)
ERYTHROCYTE [DISTWIDTH] IN BLOOD BY AUTOMATED COUNT: 13.2 % (ref 11.8–14.4)
GFR, ESTIMATED: 83 ML/MIN/1.73M2
GLUCOSE SERPL-MCNC: 100 MG/DL (ref 74–99)
HCT VFR BLD AUTO: 37.8 % (ref 36.3–47.1)
HDLC SERPL-MCNC: 44 MG/DL
HGB BLD-MCNC: 12.5 G/DL (ref 11.9–15.1)
IMM GRANULOCYTES # BLD AUTO: 0.03 K/UL (ref 0–0.3)
IMM GRANULOCYTES NFR BLD: 0 %
LDLC SERPL CALC-MCNC: 125 MG/DL (ref 0–100)
LYMPHOCYTES NFR BLD: 1.75 K/UL (ref 1.1–3.7)
LYMPHOCYTES RELATIVE PERCENT: 21 % (ref 24–43)
MCH RBC QN AUTO: 30 PG (ref 25.2–33.5)
MCHC RBC AUTO-ENTMCNC: 33.1 G/DL (ref 25.2–33.5)
MCV RBC AUTO: 90.9 FL (ref 82.6–102.9)
MONOCYTES NFR BLD: 0.64 K/UL (ref 0.1–1.2)
MONOCYTES NFR BLD: 8 % (ref 3–12)
NEUTROPHILS NFR BLD: 70 % (ref 36–65)
NEUTS SEG NFR BLD: 5.66 K/UL (ref 1.5–8.1)
NRBC BLD-RTO: 0 PER 100 WBC
PLATELET # BLD AUTO: 240 K/UL (ref 138–453)
PMV BLD AUTO: 9.9 FL (ref 8.1–13.5)
POTASSIUM SERPL-SCNC: 4 MMOL/L (ref 3.7–5.3)
RBC # BLD AUTO: 4.16 M/UL (ref 3.95–5.11)
SODIUM SERPL-SCNC: 142 MMOL/L (ref 136–145)
TRIGL SERPL-MCNC: 109 MG/DL
TROPONIN I SERPL HS-MCNC: 8 NG/L (ref 0–14)
VLDLC SERPL CALC-MCNC: 22 MG/DL (ref 1–30)
WBC OTHER # BLD: 8.2 K/UL (ref 3.5–11.3)

## 2025-05-30 PROCEDURE — 99254 IP/OBS CNSLTJ NEW/EST MOD 60: CPT | Performed by: INTERNAL MEDICINE

## 2025-05-30 PROCEDURE — 84484 ASSAY OF TROPONIN QUANT: CPT

## 2025-05-30 PROCEDURE — 99231 SBSQ HOSP IP/OBS SF/LOW 25: CPT | Performed by: INTERNAL MEDICINE

## 2025-05-30 PROCEDURE — 36415 COLL VENOUS BLD VENIPUNCTURE: CPT

## 2025-05-30 PROCEDURE — 2500000003 HC RX 250 WO HCPCS

## 2025-05-30 PROCEDURE — 94760 N-INVAS EAR/PLS OXIMETRY 1: CPT

## 2025-05-30 PROCEDURE — 85025 COMPLETE CBC W/AUTO DIFF WBC: CPT

## 2025-05-30 PROCEDURE — 2000000000 HC ICU R&B

## 2025-05-30 PROCEDURE — 93306 TTE W/DOPPLER COMPLETE: CPT | Performed by: INTERNAL MEDICINE

## 2025-05-30 PROCEDURE — 80048 BASIC METABOLIC PNL TOTAL CA: CPT

## 2025-05-30 PROCEDURE — 93005 ELECTROCARDIOGRAM TRACING: CPT

## 2025-05-30 PROCEDURE — 6370000000 HC RX 637 (ALT 250 FOR IP)

## 2025-05-30 PROCEDURE — 6370000000 HC RX 637 (ALT 250 FOR IP): Performed by: INTERNAL MEDICINE

## 2025-05-30 PROCEDURE — 80061 LIPID PANEL: CPT

## 2025-05-30 PROCEDURE — 93306 TTE W/DOPPLER COMPLETE: CPT

## 2025-05-30 RX ORDER — LISINOPRIL 5 MG/1
10 TABLET ORAL ONCE
Status: COMPLETED | OUTPATIENT
Start: 2025-05-30 | End: 2025-05-30

## 2025-05-30 RX ORDER — HYDROCHLOROTHIAZIDE 25 MG/1
25 TABLET ORAL DAILY
Status: DISCONTINUED | OUTPATIENT
Start: 2025-05-30 | End: 2025-05-31 | Stop reason: HOSPADM

## 2025-05-30 RX ORDER — BUTALBITAL, ACETAMINOPHEN AND CAFFEINE 50; 325; 40 MG/1; MG/1; MG/1
2 TABLET ORAL EVERY 4 HOURS PRN
Status: DISCONTINUED | OUTPATIENT
Start: 2025-05-30 | End: 2025-05-31 | Stop reason: HOSPADM

## 2025-05-30 RX ORDER — LISINOPRIL 20 MG/1
20 TABLET ORAL DAILY
Status: DISCONTINUED | OUTPATIENT
Start: 2025-05-31 | End: 2025-05-31 | Stop reason: HOSPADM

## 2025-05-30 RX ADMIN — ATORVASTATIN CALCIUM 80 MG: 40 TABLET, FILM COATED ORAL at 09:17

## 2025-05-30 RX ADMIN — SODIUM CHLORIDE, PRESERVATIVE FREE 10 ML: 5 INJECTION INTRAVENOUS at 09:18

## 2025-05-30 RX ADMIN — ACETAMINOPHEN 650 MG: 325 TABLET ORAL at 14:20

## 2025-05-30 RX ADMIN — BUTALBITAL, ACETAMINOPHEN, AND CAFFEINE 2 TABLET: 50; 325; 40 TABLET ORAL at 12:02

## 2025-05-30 RX ADMIN — HYDROCHLOROTHIAZIDE 25 MG: 25 TABLET ORAL at 15:42

## 2025-05-30 RX ADMIN — LISINOPRIL 10 MG: 5 TABLET ORAL at 09:17

## 2025-05-30 RX ADMIN — SODIUM CHLORIDE, PRESERVATIVE FREE 10 ML: 5 INJECTION INTRAVENOUS at 21:00

## 2025-05-30 RX ADMIN — LISINOPRIL 10 MG: 5 TABLET ORAL at 15:42

## 2025-05-30 ASSESSMENT — PAIN DESCRIPTION - ORIENTATION: ORIENTATION: MID

## 2025-05-30 ASSESSMENT — PAIN DESCRIPTION - DESCRIPTORS: DESCRIPTORS: ACHING

## 2025-05-30 ASSESSMENT — PAIN SCALES - GENERAL
PAINLEVEL_OUTOF10: 3
PAINLEVEL_OUTOF10: 7

## 2025-05-30 ASSESSMENT — PAIN SCALES - WONG BAKER: WONGBAKER_NUMERICALRESPONSE: NO HURT

## 2025-05-30 ASSESSMENT — PAIN DESCRIPTION - LOCATION: LOCATION: HEAD

## 2025-05-30 ASSESSMENT — PAIN - FUNCTIONAL ASSESSMENT: PAIN_FUNCTIONAL_ASSESSMENT: ACTIVITIES ARE NOT PREVENTED

## 2025-05-30 NOTE — CARE COORDINATION
Case Management Assessment  Initial Evaluation    Date/Time of Evaluation: 5/30/2025 10:06 AM  Assessment Completed by: PRAKASH Luciano    If patient is discharged prior to next notation, then this note serves as note for discharge by case management.    Patient Name: Maggie Kaplan                   YOB: 1960  Diagnosis: Hypertensive urgency [I16.0]                   Date / Time: 5/29/2025  4:05 PM    Patient Admission Status: Inpatient   Readmission Risk (Low < 19, Mod (19-27), High > 27): Readmission Risk Score: 3.6    Current PCP: Teagan Delaney APRN - CNP  PCP verified by CM? Yes    Chart Reviewed: Yes      History Provided by: Patient  Patient Orientation: Alert and Oriented    Patient Cognition: Alert    Hospitalization in the last 30 days (Readmission):  No    If yes, Readmission Assessment in CM Navigator will be completed.    Advance Directives:      Code Status: Full Code   Patient's Primary Decision Maker is:        Discharge Planning:    Patient lives with: Spouse/Significant Other Type of Home: House  Primary Care Giver: Self  Patient Support Systems include: Spouse/Significant Other   Current Financial resources: Medicaid  Current community resources:    Current services prior to admission: None            Current DME:              Type of Home Care services:  None    ADLS  Prior functional level: Independent in ADLs/IADLs  Current functional level: Independent in ADLs/IADLs    PT AM-PAC:   /24  OT AM-PAC:   /24    Family can provide assistance at DC: Yes  Would you like Case Management to discuss the discharge plan with any other family members/significant others, and if so, who?    Plans to Return to Present Housing: Yes  Other Identified Issues/Barriers to RETURNING to current housing: none  Potential Assistance needed at discharge: (P) N/A            Potential DME:    Patient expects to discharge to: House  Plan for transportation at discharge: (P)

## 2025-05-30 NOTE — PLAN OF CARE
Problem: Discharge Planning  Goal: Discharge to home or other facility with appropriate resources  5/30/2025 1150 by Bette Delarosa RN  Outcome: Progressing  5/30/2025 0542 by Sandra Hall RN  Outcome: Progressing  Flowsheets (Taken 5/29/2025 2107)  Discharge to home or other facility with appropriate resources: Identify barriers to discharge with patient and caregiver     Problem: Pain  Goal: Verbalizes/displays adequate comfort level or baseline comfort level  5/30/2025 1150 by Bette Delarosa RN  Outcome: Progressing  5/30/2025 0542 by Sandra Hall RN  Outcome: Progressing     Problem: ABCDS Injury Assessment  Goal: Absence of physical injury  5/30/2025 1150 by Bette Delarosa RN  Outcome: Progressing  5/30/2025 0542 by Sandra Hall RN  Outcome: Progressing     Problem: Safety - Adult  Goal: Free from fall injury  5/30/2025 1150 by Bette Delarosa RN  Outcome: Progressing  5/30/2025 0542 by Sandra Hall RN  Outcome: Progressing     Problem: Cardiovascular - Adult  Goal: Maintains optimal cardiac output and hemodynamic stability  5/30/2025 1150 by Bette Delarosa RN  Outcome: Progressing  5/30/2025 0542 by Sandra Hall RN  Outcome: Progressing  Goal: Absence of cardiac dysrhythmias or at baseline  5/30/2025 0542 by Sandra Hall RN  Outcome: Progressing

## 2025-05-30 NOTE — PROGRESS NOTES
Hospitalist Progress Note    Patient:  Maggie Kaplan     YOB: 1960    MRN: [id number]   Admit date: 5/29/2025     Acct: [id number]     PCP: Teagan Delaney APRN - CNP    CC--Interval History: Hypertensive urgency followed by hypotension and bradycardia---see H&P incorporated by reference herein    All other ROS negative except noted in HPI    Diet:  ADULT DIET; Regular; Low Fat/Low Chol/High Fiber/CLEVE    Medications:  Scheduled Meds:   atorvastatin  80 mg Oral Daily    lisinopril  10 mg Oral Daily    sodium chloride flush  5-40 mL IntraVENous 2 times per day    nicotine  1 patch TransDERmal Daily     Continuous Infusions:   sodium chloride       PRN Meds:butalbital-acetaminophen-caffeine, cyclobenzaprine, sodium chloride flush, sodium chloride, ondansetron **OR** ondansetron, polyethylene glycol, acetaminophen **OR** acetaminophen    Objective:  Labs:  CBC with Differential:    Lab Results   Component Value Date/Time    WBC 8.2 05/30/2025 05:19 AM    RBC 4.16 05/30/2025 05:19 AM    HGB 12.5 05/30/2025 05:19 AM    HCT 37.8 05/30/2025 05:19 AM     05/30/2025 05:19 AM    MCV 90.9 05/30/2025 05:19 AM    MCH 30.0 05/30/2025 05:19 AM    MCHC 33.1 05/30/2025 05:19 AM    RDW 13.2 05/30/2025 05:19 AM    LYMPHOPCT 21 05/30/2025 05:19 AM    MONOPCT 8 05/30/2025 05:19 AM    EOSPCT 1 05/30/2025 05:19 AM    BASOPCT 0 05/30/2025 05:19 AM    MONOSABS 0.64 05/30/2025 05:19 AM    LYMPHSABS 1.75 05/30/2025 05:19 AM    EOSABS 0.09 05/30/2025 05:19 AM    BASOSABS 0.03 05/30/2025 05:19 AM    DIFFTYPE NOT REPORTED 02/06/2022 09:40 AM     BMP:    Lab Results   Component Value Date/Time     05/30/2025 05:19 AM    K 4.0 05/30/2025 05:19 AM     05/30/2025 05:19 AM    CO2 24 05/30/2025 05:19 AM    BUN 17 05/30/2025 05:19 AM    CREATININE 0.8 05/30/2025 05:19 AM    CALCIUM 9.2 05/30/2025 05:19 AM    GFRAA 53 09/19/2022 02:03 PM    LABGLOM 83 05/30/2025 05:19 AM    LABGLOM 57 11/20/2023 09:28 AM

## 2025-05-30 NOTE — PROGRESS NOTES
Spiritual Health History and Assessment/Progress Note  [facility]    (P) Spiritual/Emotional Needs,  ,  ,      Name: Maggie Kaplan MRN: [id number]    Age: 64 y.o.     Sex: female   Language: English   Buddhist: Non-Mormonism   Hypertensive urgency     Date: 5/30/2025            Total Time Calculated: (P) 21 min              Spiritual Assessment began in [facility]  PROGRESSIVE CARE        Referral/Consult From: (P) Rounding   Encounter Overview/Reason: (P) Spiritual/Emotional Needs  Service Provided For: (P) Patient    Patient reported feeling a little better but still concerned about her headache and cause of her condition.  Patient has the support of her  and sister, is helping to raise her great-grandson, and is hoping to retire in the next few years.  Patient was receptive of prayer and did not express any spiritual concerns at this time.    Kaila, Belief, Meaning:   Patient has beliefs or practices that help with coping during difficult times  Family/Friends No family/friends present      Importance and Influence:  Patient has spiritual/personal beliefs that influence decisions regarding their health  Family/Friends No family/friends present    Community:  Patient feels well-supported. Support system includes: Spouse/Partner and Children  Family/Friends No family/friends present    Assessment and Plan of Care:     Patient Interventions include: Facilitated expression of thoughts and feelings, Affirmed coping skills/support systems, and Provided sacramental/Mormon ritual  Family/Friends Interventions include: No family/friends present    Patient Plan of Care: Spiritual Care available upon further referral  Family/Friends Plan of Care: No family/friends present     05/30/25 1518   Encounter Summary   Encounter Overview/Reason Spiritual/Emotional Needs   Service Provided For Patient   Referral/Consult From Rounding   Support System Spouse;Children;Family members   Last Encounter

## 2025-05-30 NOTE — CONSULTS
[facility]  Maggie Kaplan is here for initial evaluation.     64-year-old female with past medical history of hypertension, hyperlipidemia CKD tobacco use presented to the ER due to headache found to have elevated blood pressure of 190/120 mmHg, CT brain negative for stroke, patient started on Lipitor infusion and cardiology consulted,    Patient blood pressure dropped significantly from 192/110 mmHg with heart rate dropped to 47 bpm and Lipitor drip was stopped.  This morning patient blood pressure is completely normal.  She denies any chest pain, shortness of breath, orthopnea, PND, lower leg edema.  EKG shows sinus bradycardia  Transthoracic echocardiogram images reviewed which showed normal EF without any significant valvular abnormalities.    Past Medical History:  Past Medical History:   Diagnosis Date    Abnormal Pap smear of cervix 1970    before her hysterectomy in 1972    CAD (coronary artery disease)     Chronic kidney disease     COPD (chronic obstructive pulmonary disease) (HCC)     Hemorrhoids     History of herpes zoster 2002    (right lower rib area laterally and anteriorly).    History of thyroiditis 1992    History of ulcerative colitis     (of 20 years disease - though quiescent).    Hypercholesterolemia     Hypertension     Neuritis     Shortened [place] interval     (on EKG from 2004).    Tobacco abuse     Venous insufficiency, peripheral     (bilateral).       Past Surgical History:  Past Surgical History:   Procedure Laterality Date    APPENDECTOMY  1982    (Dr. UZMA Krause) - Acute appendicitis.    CARDIAC CATHETERIZATION  2000    Preserved left ventricular systolic function. Ejection fraction 50%. Noncritical coronary disease.  (Dr. Tomas).    CHOLECYSTECTOMY  2001    COLONOSCOPY  1988    (Dr. ELIEL Krause) - Normal colon.    COLONOSCOPY  05/12/2016    Polyp distal sigmoid, rectum - Dr Poon    COLONOSCOPY N/A 10/29/2024    hyperplastic x2, tubular x1, benign mucosal polyp x1; Dr sahu at [facility]     level, No change in activity level.     Eyes: No visual changes or diplopia. No scleral icterus.  ENT: No Headaches, hearing loss or vertigo. No mouth sores or sore throat.  Cardiovascular: As described in HPI.   Respiratory: AS HPI  Gastrointestinal: No abdominal pain, appetite loss, blood in stools. No change in bowel or bladder habits.  Genitourinary: No dysuria, trouble voiding, or hematuria.  Musculoskeletal:  No gait disturbance, No weakness or joint complaints.  Integumentary: No rash or pruritis.  Neurological: No headache, diplopia, change in muscle strength, numbness or tingling  Psychiatric: No new anxiety or depression.  Endocrine: No temperature intolerance. No excessive thirst, fluid intake, or urination. No tremor.  Hematologic/Lymphatic: No abnormal bruising or bleeding, blood clots or swollen lymph nodes.  Allergic/Immunologic: No nasal congestion or hives.    Medications:  Current Facility-Administered Medications   Medication Dose Route Frequency Provider Last Rate Last Admin    atorvastatin (LIPITOR) tablet 80 mg  80 mg Oral Daily Dilia Pacheco APRN - NP        cyclobenzaprine (FLEXERIL) tablet 10 mg  10 mg Oral BID PRN Dilia Pacheco, APRN - NP        ibuprofen (ADVIL;MOTRIN) tablet 600 mg  600 mg Oral TID PRN Dilia Pacheco, APRN - NP        lisinopril (PRINIVIL;ZESTRIL) tablet 10 mg  10 mg Oral Daily Dilia Pacheco, APRN - NP        sodium chloride flush 0.9 % injection 5-40 mL  5-40 mL IntraVENous 2 times per day Dilia Pacheco, APRN - NP        sodium chloride flush 0.9 % injection 5-40 mL  5-40 mL IntraVENous PRN Dilia Pacheco, APRN - NP        0.9 % sodium chloride infusion   IntraVENous PRN Dilia Pacheco, APRN - NP        ondansetron (ZOFRAN-ODT) disintegrating tablet 4 mg  4 mg Oral Q8H PRN Dilia Pacheco APRN - NP        Or    ondansetron (ZOFRAN) injection 4 mg  4 mg IntraVENous Q6H PRN Dilia Pacheco, APRN - NP   4 mg at 05/29/25 2101    polyethylene glycol (GLYCOLAX) packet 17 g  17 g

## 2025-05-31 VITALS
RESPIRATION RATE: 25 BRPM | TEMPERATURE: 98 F | HEART RATE: 52 BPM | DIASTOLIC BLOOD PRESSURE: 95 MMHG | BODY MASS INDEX: 22.52 KG/M2 | WEIGHT: 131.9 LBS | SYSTOLIC BLOOD PRESSURE: 158 MMHG | HEIGHT: 64 IN | OXYGEN SATURATION: 96 %

## 2025-05-31 LAB
ANION GAP SERPL CALCULATED.3IONS-SCNC: 13 MMOL/L (ref 9–17)
BASOPHILS # BLD: 0.03 K/UL (ref 0–0.2)
BASOPHILS NFR BLD: 0 % (ref 0–2)
BUN SERPL-MCNC: 18 MG/DL (ref 8–23)
BUN/CREAT SERPL: 23 (ref 9–20)
CALCIUM SERPL-MCNC: 9.5 MG/DL (ref 8.6–10.4)
CHLORIDE SERPL-SCNC: 101 MMOL/L (ref 98–107)
CO2 SERPL-SCNC: 26 MMOL/L (ref 20–31)
CREAT SERPL-MCNC: 0.8 MG/DL (ref 0.5–0.9)
EOSINOPHIL # BLD: 0.14 K/UL (ref 0–0.44)
EOSINOPHILS RELATIVE PERCENT: 2 % (ref 1–4)
ERYTHROCYTE [DISTWIDTH] IN BLOOD BY AUTOMATED COUNT: 13.1 % (ref 11.8–14.4)
GFR, ESTIMATED: 83 ML/MIN/1.73M2
GLUCOSE SERPL-MCNC: 108 MG/DL (ref 70–99)
HCT VFR BLD AUTO: 39.5 % (ref 36.3–47.1)
HGB BLD-MCNC: 13.2 G/DL (ref 11.9–15.1)
IMM GRANULOCYTES # BLD AUTO: 0.04 K/UL (ref 0–0.3)
IMM GRANULOCYTES NFR BLD: 0 %
LYMPHOCYTES NFR BLD: 1.51 K/UL (ref 1.1–3.7)
LYMPHOCYTES RELATIVE PERCENT: 16 % (ref 24–43)
MCH RBC QN AUTO: 30.1 PG (ref 25.2–33.5)
MCHC RBC AUTO-ENTMCNC: 33.4 G/DL (ref 25.2–33.5)
MCV RBC AUTO: 90 FL (ref 82.6–102.9)
MONOCYTES NFR BLD: 0.63 K/UL (ref 0.1–1.2)
MONOCYTES NFR BLD: 7 % (ref 3–12)
NEUTROPHILS NFR BLD: 75 % (ref 36–65)
NEUTS SEG NFR BLD: 7.19 K/UL (ref 1.5–8.1)
NRBC BLD-RTO: 0 PER 100 WBC
PLATELET # BLD AUTO: 245 K/UL (ref 138–453)
PMV BLD AUTO: 10.1 FL (ref 8.1–13.5)
POTASSIUM SERPL-SCNC: 3.9 MMOL/L (ref 3.7–5.3)
RBC # BLD AUTO: 4.39 M/UL (ref 3.95–5.11)
SODIUM SERPL-SCNC: 140 MMOL/L (ref 135–144)
WBC OTHER # BLD: 9.5 K/UL (ref 3.5–11.3)

## 2025-05-31 PROCEDURE — 2500000003 HC RX 250 WO HCPCS

## 2025-05-31 PROCEDURE — 36415 COLL VENOUS BLD VENIPUNCTURE: CPT

## 2025-05-31 PROCEDURE — 6370000000 HC RX 637 (ALT 250 FOR IP)

## 2025-05-31 PROCEDURE — 80048 BASIC METABOLIC PNL TOTAL CA: CPT

## 2025-05-31 PROCEDURE — 85025 COMPLETE CBC W/AUTO DIFF WBC: CPT

## 2025-05-31 PROCEDURE — 6370000000 HC RX 637 (ALT 250 FOR IP): Performed by: INTERNAL MEDICINE

## 2025-05-31 RX ORDER — LISINOPRIL AND HYDROCHLOROTHIAZIDE 20; 25 MG/1; MG/1
1 TABLET ORAL DAILY
Qty: 30 TABLET | Refills: 5 | Status: SHIPPED | OUTPATIENT
Start: 2025-05-31

## 2025-05-31 RX ADMIN — LISINOPRIL 20 MG: 20 TABLET ORAL at 08:00

## 2025-05-31 RX ADMIN — BUTALBITAL, ACETAMINOPHEN, AND CAFFEINE 2 TABLET: 50; 325; 40 TABLET ORAL at 11:45

## 2025-05-31 RX ADMIN — SODIUM CHLORIDE, PRESERVATIVE FREE 10 ML: 5 INJECTION INTRAVENOUS at 08:02

## 2025-05-31 RX ADMIN — ATORVASTATIN CALCIUM 80 MG: 40 TABLET, FILM COATED ORAL at 08:00

## 2025-05-31 RX ADMIN — BUTALBITAL, ACETAMINOPHEN, AND CAFFEINE 2 TABLET: 50; 325; 40 TABLET ORAL at 06:50

## 2025-05-31 RX ADMIN — HYDROCHLOROTHIAZIDE 25 MG: 25 TABLET ORAL at 08:00

## 2025-05-31 ASSESSMENT — PAIN DESCRIPTION - DESCRIPTORS
DESCRIPTORS: ACHING

## 2025-05-31 ASSESSMENT — PAIN DESCRIPTION - LOCATION
LOCATION: HEAD

## 2025-05-31 ASSESSMENT — PAIN DESCRIPTION - ORIENTATION
ORIENTATION: MID
ORIENTATION: MID

## 2025-05-31 ASSESSMENT — PAIN SCALES - GENERAL
PAINLEVEL_OUTOF10: 5
PAINLEVEL_OUTOF10: 3
PAINLEVEL_OUTOF10: 7
PAINLEVEL_OUTOF10: 7

## 2025-05-31 ASSESSMENT — PAIN DESCRIPTION - PAIN TYPE: TYPE: ACUTE PAIN

## 2025-05-31 ASSESSMENT — PAIN - FUNCTIONAL ASSESSMENT: PAIN_FUNCTIONAL_ASSESSMENT: ACTIVITIES ARE NOT PREVENTED

## 2025-05-31 NOTE — DISCHARGE SUMMARY
Hospitalist Discharge Summary    Maggie Kaplan  :  1960  MRN:  [id number]    Admit date:  2025  Discharge date: 25     Admitting Physician:  Lewis Jay MD    Discharge Diagnoses:   Patient Active Problem List   Diagnosis    CAD (coronary artery disease)    Tobacco abuse    Hypertension    Hypercholesterolemia    History of thyroiditis    Insomnia    Hydronephrosis with obstructing calculus    Kidney stone    Vitamin D deficiency    Hypertensive urgency    Mixed hyperlipidemia    Smoking addiction        Admission Condition:  fair      Discharged Condition:  good    Hospital Course/Treatments   admitted with hypertensive emergency, pt was admitted headache, pt was found to hv htn and was admiited, pt had w/u including echo, pt was started on lisinopril with hctz, pressure has been better, pt is smoker  advised to quit smoking. Pt dave be d/c with following meds    Discharge Medications:         Medication List        START taking these medications      lisinopril-hydroCHLOROthiazide 20-25 MG per tablet  Commonly known as: PRINZIDE;ZESTORETIC  Take 1 tablet by mouth daily            CONTINUE taking these medications      alendronate 70 MG tablet  Commonly known as: FOSAMAX  TAKE 1 TABLET BY MOUTH ONCE WEEKLY ON AN EMPTY STOMACH BEFORE BREAKFAST. REMAIN UPRIGHT FOR 30 MINUTES AND TAKE WITH 8 OUNCES OF WATER     atorvastatin 80 MG tablet  Commonly known as: LIPITOR  Take 1 tablet by mouth daily     cyclobenzaprine 10 MG tablet  Commonly known as: FLEXERIL  take 1 tablet by mouth twice a day if needed for muscle spasm     HAIR/SKIN/NAILS PO     ibuprofen 800 MG tablet  Commonly known as: ADVIL;MOTRIN  TAKE 1 TABLET BY MOUTH 3 TIMES A DAY AS NEEDED FOR PAIN     vitamin D 1.25 MG (72855 UT) Caps capsule  Commonly known as: ERGOCALCIFEROL  TAKE 1 CAPSULE BY MOUTH ONCE WEEKLY            STOP taking these medications      fluconazole 100 MG tablet  Commonly known as: Diflucan     lisinopril 10 MG  sinus.  Otherwise, the visualized paranasal sinuses demonstrate no acute abnormality.  Left mastoid effusion. SOFT TISSUES/SKULL:  No acute abnormality of the visualized skull or soft tissues.     1. No acute intracranial abnormality. 2. Mild age-appropriate atrophy and small-vessel disease ischemic changes. 3. Left mastoid effusion.     XR CHEST PORTABLE  Result Date: 5/29/2025  EXAMINATION: ONE XRAY VIEW OF THE CHEST 5/29/2025 4:48 pm COMPARISON: None. HISTORY: ORDERING SYSTEM PROVIDED HISTORY: htn TECHNOLOGIST PROVIDED HISTORY: htn Reason for Exam: htn FINDINGS: The cardiomediastinal silhouette is unremarkable.  Aortic vascular calcification.  The lungs clear.  No infiltrate, pleural fluid or evidence of overt failure.     No acute cardiopulmonary disease.       Disposition:   home    Discharge Instructions:  Activity: activity as tolerated  Diet:  cardiac diet    Follow up with Teagan Delaney APRN - CNP in 1 weeks.    Signed:  Mayur Bhatt MD  5/31/2025, 2:53 PM    Time spent in discharge of this pt is more than 30 minutes in examination,evaluvation,  counseling and review of medication and discharge plan

## 2025-05-31 NOTE — PLAN OF CARE
Problem: Discharge Planning  Goal: Discharge to home or other facility with appropriate resources  5/31/2025 0748 by Shock, Rylee, RN  Outcome: Progressing  5/30/2025 2036 by Giuliana Kwok RN  Outcome: Progressing  Flowsheets (Taken 5/30/2025 2029)  Discharge to home or other facility with appropriate resources: Identify barriers to discharge with patient and caregiver     Problem: Pain  Goal: Verbalizes/displays adequate comfort level or baseline comfort level  5/31/2025 0748 by Shock, Rylee, RN  Outcome: Progressing  5/30/2025 2036 by Giuliana Kwok RN  Outcome: Progressing     Problem: ABCDS Injury Assessment  Goal: Absence of physical injury  5/31/2025 0748 by Shock, Rylee, RN  Outcome: Progressing  5/30/2025 2036 by Giuliana Kwok RN  Outcome: Progressing     Problem: Safety - Adult  Goal: Free from fall injury  5/31/2025 0748 by Shock, Rylee, RN  Outcome: Progressing  5/30/2025 2036 by Giuliana Kwok RN  Outcome: Progressing     Problem: Cardiovascular - Adult  Goal: Maintains optimal cardiac output and hemodynamic stability  5/31/2025 0748 by Shock, Rylee, RN  Outcome: Progressing  5/30/2025 2036 by Giuliana Kwok RN  Outcome: Progressing  Flowsheets (Taken 5/30/2025 2029)  Maintains optimal cardiac output and hemodynamic stability:   Monitor blood pressure and heart rate   Assess for signs of decreased cardiac output  Goal: Absence of cardiac dysrhythmias or at baseline  5/31/2025 0748 by Shock, Rylee, RN  Outcome: Progressing  5/30/2025 2036 by Giuliana Kwok RN  Outcome: Progressing  Flowsheets (Taken 5/30/2025 2029)  Absence of cardiac dysrhythmias or at baseline:   Monitor cardiac rate and rhythm   Assess for signs of decreased cardiac output   Administer antiarrhythmia medication and electrolyte replacement as ordered     Problem: Respiratory - Adult  Goal: Achieves optimal ventilation and oxygenation  5/31/2025 0748 by Shock, Rylee, RN  Outcome: Progressing  5/30/2025 2036 by Sundar

## 2025-05-31 NOTE — PLAN OF CARE
Problem: Discharge Planning  Goal: Discharge to home or other facility with appropriate resources  5/31/2025 1504 by Shock, Rylee, RN  Outcome: Adequate for Discharge  5/31/2025 0748 by Shock, Rylee, RN  Outcome: Progressing     Problem: Pain  Goal: Verbalizes/displays adequate comfort level or baseline comfort level  5/31/2025 1504 by Shock, Rylee, RN  Outcome: Adequate for Discharge  5/31/2025 0748 by Shock, Rylee, RN  Outcome: Progressing     Problem: ABCDS Injury Assessment  Goal: Absence of physical injury  5/31/2025 1504 by Shock, Rylee, RN  Outcome: Adequate for Discharge  5/31/2025 0748 by Shock, Rylee, RN  Outcome: Progressing     Problem: Safety - Adult  Goal: Free from fall injury  5/31/2025 1504 by Shock, Rylee, RN  Outcome: Adequate for Discharge  5/31/2025 0748 by Shock, Rylee, RN  Outcome: Progressing     Problem: Cardiovascular - Adult  Goal: Maintains optimal cardiac output and hemodynamic stability  5/31/2025 1504 by Shock, Rylee, RN  Outcome: Adequate for Discharge  5/31/2025 0748 by Shock, Rylee, RN  Outcome: Progressing  Goal: Absence of cardiac dysrhythmias or at baseline  5/31/2025 1504 by Shock, Rylee, RN  Outcome: Adequate for Discharge  5/31/2025 0748 by Shock, Rylee, RN  Outcome: Progressing     Problem: Respiratory - Adult  Goal: Achieves optimal ventilation and oxygenation  5/31/2025 1504 by Shock, Rylee, RN  Outcome: Adequate for Discharge  5/31/2025 0748 by Shock, Rylee, RN  Outcome: Progressing     Problem: Metabolic/Fluid and Electrolytes - Adult  Goal: Electrolytes maintained within normal limits  5/31/2025 1504 by Shock, Rylee, RN  Outcome: Adequate for Discharge  5/31/2025 0748 by Shock, Rylee, RN  Outcome: Progressing

## 2025-05-31 NOTE — PLAN OF CARE
Problem: Discharge Planning  Goal: Discharge to home or other facility with appropriate resources  5/30/2025 2036 by Giuliana Kwok RN  Outcome: Progressing  Flowsheets (Taken 5/30/2025 2029)  Discharge to home or other facility with appropriate resources: Identify barriers to discharge with patient and caregiver  5/30/2025 1150 by Bette Delarosa RN  Outcome: Progressing     Problem: Pain  Goal: Verbalizes/displays adequate comfort level or baseline comfort level  5/30/2025 2036 by Giuliana Kwok RN  Outcome: Progressing  5/30/2025 1150 by Bette Delarosa RN  Outcome: Progressing     Problem: ABCDS Injury Assessment  Goal: Absence of physical injury  5/30/2025 2036 by Giuliana Kwok RN  Outcome: Progressing  5/30/2025 1150 by Bette Delarosa RN  Outcome: Progressing     Problem: Safety - Adult  Goal: Free from fall injury  5/30/2025 2036 by Giuliana Kwok RN  Outcome: Progressing  5/30/2025 1150 by Bette Delarosa RN  Outcome: Progressing     Problem: Cardiovascular - Adult  Goal: Maintains optimal cardiac output and hemodynamic stability  5/30/2025 2036 by Giuliana Kwok RN  Outcome: Progressing  Flowsheets (Taken 5/30/2025 2029)  Maintains optimal cardiac output and hemodynamic stability:   Monitor blood pressure and heart rate   Assess for signs of decreased cardiac output  5/30/2025 1150 by Bette Delarosa RN  Outcome: Progressing  Goal: Absence of cardiac dysrhythmias or at baseline  Outcome: Progressing  Flowsheets (Taken 5/30/2025 2029)  Absence of cardiac dysrhythmias or at baseline:   Monitor cardiac rate and rhythm   Assess for signs of decreased cardiac output   Administer antiarrhythmia medication and electrolyte replacement as ordered     Problem: Respiratory - Adult  Goal: Achieves optimal ventilation and oxygenation  Outcome: Progressing     Problem: Metabolic/Fluid and Electrolytes - Adult  Goal: Electrolytes maintained within normal limits  Outcome: Progressing

## 2025-06-02 ENCOUNTER — TELEPHONE (OUTPATIENT)
Dept: FAMILY MEDICINE CLINIC | Age: 65
End: 2025-06-02

## 2025-06-02 LAB
EKG ATRIAL RATE: 48 BPM
EKG P AXIS: 57 DEGREES
EKG P-R INTERVAL: 110 MS
EKG Q-T INTERVAL: 530 MS
EKG QRS DURATION: 84 MS
EKG QTC CALCULATION (BAZETT): 473 MS
EKG R AXIS: 73 DEGREES
EKG T AXIS: 69 DEGREES
EKG VENTRICULAR RATE: 48 BPM

## 2025-06-03 NOTE — TELEPHONE ENCOUNTER
2nd attempt. No answer, unable to leave message.   
Attempted to contact patient. No answer, unable to leave message.   
Patient discharged from  MD on 5/31/25  Diagnosis:Hypertensive emergency     
17-Aug-2017

## 2025-06-04 ENCOUNTER — HOSPITAL ENCOUNTER (OUTPATIENT)
Age: 65
Discharge: HOME | End: 2025-06-04
Payer: COMMERCIAL

## 2025-06-04 ENCOUNTER — OFFICE VISIT (OUTPATIENT)
Dept: FAMILY MEDICINE CLINIC | Age: 65
End: 2025-06-04

## 2025-06-04 VITALS
BODY MASS INDEX: 22.47 KG/M2 | OXYGEN SATURATION: 98 % | HEART RATE: 120 BPM | SYSTOLIC BLOOD PRESSURE: 122 MMHG | WEIGHT: 126.8 LBS | HEIGHT: 63 IN | DIASTOLIC BLOOD PRESSURE: 86 MMHG | RESPIRATION RATE: 18 BRPM

## 2025-06-04 DIAGNOSIS — R00.0 TACHYCARDIA: ICD-10-CM

## 2025-06-04 DIAGNOSIS — I16.0 HYPERTENSIVE URGENCY: ICD-10-CM

## 2025-06-04 DIAGNOSIS — I10 PRIMARY HYPERTENSION: ICD-10-CM

## 2025-06-04 DIAGNOSIS — R51.9 NONINTRACTABLE HEADACHE, UNSPECIFIED CHRONICITY PATTERN, UNSPECIFIED HEADACHE TYPE: ICD-10-CM

## 2025-06-04 DIAGNOSIS — Z09 HOSPITAL DISCHARGE FOLLOW-UP: Primary | ICD-10-CM

## 2025-06-04 LAB
ALBUMIN SERPL-MCNC: 4.8 G/DL (ref 3.5–5.2)
ALBUMIN/GLOB SERPL: 1.5 {RATIO} (ref 1–2.5)
ALP SERPL-CCNC: 85 U/L (ref 35–104)
ALT SERPL-CCNC: 21 U/L (ref 10–35)
ANION GAP SERPL CALCULATED.3IONS-SCNC: 14 MMOL/L (ref 9–16)
AST SERPL-CCNC: 19 U/L (ref 10–35)
BILIRUB SERPL-MCNC: 0.3 MG/DL (ref 0–1.2)
BUN SERPL-MCNC: 46 MG/DL (ref 8–23)
BUN/CREAT SERPL: 31 (ref 9–20)
CALCIUM SERPL-MCNC: 11.3 MG/DL (ref 8.6–10.4)
CHLORIDE SERPL-SCNC: 104 MMOL/L (ref 98–107)
CO2 SERPL-SCNC: 24 MMOL/L (ref 20–31)
CREAT SERPL-MCNC: 1.5 MG/DL (ref 0.6–0.9)
GFR, ESTIMATED: 39 ML/MIN/1.73M2
GLUCOSE SERPL-MCNC: 67 MG/DL (ref 74–99)
POTASSIUM SERPL-SCNC: 4.1 MMOL/L (ref 3.7–5.3)
PROT SERPL-MCNC: 8 G/DL (ref 6.6–8.7)
SODIUM SERPL-SCNC: 142 MMOL/L (ref 136–145)
TSH SERPL DL<=0.05 MIU/L-ACNC: 1.05 UIU/ML (ref 0.27–4.2)

## 2025-06-04 PROCEDURE — 36415 COLL VENOUS BLD VENIPUNCTURE: CPT

## 2025-06-04 PROCEDURE — 84443 ASSAY THYROID STIM HORMONE: CPT

## 2025-06-04 PROCEDURE — 80053 COMPREHEN METABOLIC PANEL: CPT

## 2025-06-04 RX ORDER — BUTALBITAL, ACETAMINOPHEN AND CAFFEINE 50; 325; 40 MG/1; MG/1; MG/1
1 TABLET ORAL EVERY 6 HOURS PRN
Qty: 20 TABLET | Refills: 0 | Status: SHIPPED | OUTPATIENT
Start: 2025-06-04

## 2025-06-04 ASSESSMENT — PATIENT HEALTH QUESTIONNAIRE - PHQ9
SUM OF ALL RESPONSES TO PHQ QUESTIONS 1-9: 0
SUM OF ALL RESPONSES TO PHQ QUESTIONS 1-9: 0
1. LITTLE INTEREST OR PLEASURE IN DOING THINGS: NOT AT ALL
SUM OF ALL RESPONSES TO PHQ QUESTIONS 1-9: 0
2. FEELING DOWN, DEPRESSED OR HOPELESS: NOT AT ALL
SUM OF ALL RESPONSES TO PHQ QUESTIONS 1-9: 0

## 2025-06-04 ASSESSMENT — ENCOUNTER SYMPTOMS
BLOOD IN STOOL: 0
SHORTNESS OF BREATH: 0
DIARRHEA: 0
ABDOMINAL PAIN: 0
CONSTIPATION: 0

## 2025-06-04 NOTE — PROGRESS NOTES
Subjective:      Patient ID: Maggie Kaplan is a 64 y.o. female coming in for   Chief Complaint   Patient presents with    Follow-Up from Hospital     Hospital follow up from 05/29-05/31 for HTN. She is still having headaches. Reports still having high BP at home. Checking every couple hours at home.         History of Present Illness        History of Present Illness  The patient is a 64-year-old female who presents to the office for a recent hospital discharge follow-up. She was admitted on 05/29/2025 and discharged on 05/31/2025 for hypertensive urgency. During admission, a CT head was done without contrast, which was unremarkable. A chest x-ray was also unremarkable. Troponins were trended and all were within normal limits. Renal status was within normal limits. An echocardiogram showed an ejection fraction (EF) of 60%, otherwise unremarkable. An EKG showed some sinus bradycardia during admission. She was placed on lisinopril/hydrochlorothiazide 20/25 once a day. Her blood pressure today is much improved at 122/86, though her heart rate is elevated.    She reports experiencing headaches, which she attributes to her new medication regimen. These headaches typically occur upon awakening and subside with movement. There are no specific triggers for the headaches, and she has not observed any correlation between her headaches and elevated blood pressure.     She recalls an episode of excessive sweating while at work, which necessitated a break. Despite feeling slightly better after resting, she continued to experience a headache. The following day, she experienced severe headache, sweating, weakness, and nausea, which led to her hospital admission. She has been managing her headaches with Tylenol, but it has proven ineffective.    She has been monitoring her blood pressure at home, which has remained stable since her discharge. Bps started off with systolic readings around 160 and trending down daily to current

## 2025-06-08 ENCOUNTER — RESULTS FOLLOW-UP (OUTPATIENT)
Dept: FAMILY MEDICINE CLINIC | Age: 65
End: 2025-06-08

## 2025-06-08 DIAGNOSIS — R79.89 ELEVATED SERUM CREATININE: Primary | ICD-10-CM

## 2025-06-09 ENCOUNTER — OFFICE VISIT (OUTPATIENT)
Dept: CARDIOLOGY | Age: 65
End: 2025-06-09
Payer: COMMERCIAL

## 2025-06-09 VITALS
HEART RATE: 76 BPM | OXYGEN SATURATION: 97 % | DIASTOLIC BLOOD PRESSURE: 80 MMHG | BODY MASS INDEX: 22.2 KG/M2 | HEIGHT: 64 IN | SYSTOLIC BLOOD PRESSURE: 136 MMHG | WEIGHT: 130 LBS

## 2025-06-09 DIAGNOSIS — R06.02 SHORTNESS OF BREATH: ICD-10-CM

## 2025-06-09 DIAGNOSIS — R94.31 ABNORMAL ELECTROCARDIOGRAPHY: ICD-10-CM

## 2025-06-09 DIAGNOSIS — I16.0 HYPERTENSIVE URGENCY: Primary | ICD-10-CM

## 2025-06-09 PROCEDURE — 99406 BEHAV CHNG SMOKING 3-10 MIN: CPT | Performed by: SURGERY

## 2025-06-09 PROCEDURE — G8420 CALC BMI NORM PARAMETERS: HCPCS | Performed by: SURGERY

## 2025-06-09 PROCEDURE — 3017F COLORECTAL CA SCREEN DOC REV: CPT | Performed by: SURGERY

## 2025-06-09 PROCEDURE — 3079F DIAST BP 80-89 MM HG: CPT | Performed by: SURGERY

## 2025-06-09 PROCEDURE — 1111F DSCHRG MED/CURRENT MED MERGE: CPT | Performed by: SURGERY

## 2025-06-09 PROCEDURE — 4004F PT TOBACCO SCREEN RCVD TLK: CPT | Performed by: SURGERY

## 2025-06-09 PROCEDURE — G8427 DOCREV CUR MEDS BY ELIG CLIN: HCPCS | Performed by: SURGERY

## 2025-06-09 PROCEDURE — 99214 OFFICE O/P EST MOD 30 MIN: CPT | Performed by: SURGERY

## 2025-06-09 PROCEDURE — 3075F SYST BP GE 130 - 139MM HG: CPT | Performed by: SURGERY

## 2025-06-09 NOTE — PROGRESS NOTES
Cardiology Consultation  [facility]      06/09/25      CC & HPI:  Maggie Kaplan   is here to establish care after hospitalization stay . was recently in the hospital for hypertension urgency cardiology was consulted for hypertension echo was obtained and she is here for follow-up   Seen and examined continue to smoke but had cut down , blood pressure improved after medication adjustment .  Concerned about her hospital medical bills stating that her insurance will not be able to pay for her hospital visit , dyspnea with activity      Past Medical History:   Diagnosis Date    Abnormal Pap smear of cervix 1970    before her hysterectomy in 1972    CAD (coronary artery disease)     Chronic kidney disease     COPD (chronic obstructive pulmonary disease) (HCC)     Hemorrhoids     History of herpes zoster 2002    (right lower rib area laterally and anteriorly).    History of thyroiditis 1992    History of ulcerative colitis     (of 20 years disease - though quiescent).    Hypercholesterolemia     Hypertension     Neuritis     Shortened SC interval     (on EKG from 2004).    Tobacco abuse     Venous insufficiency, peripheral     (bilateral).       Past Surgical History:   Procedure Laterality Date    APPENDECTOMY  1982    (Dr. UZMA Krause) - Acute appendicitis.    CARDIAC CATHETERIZATION  2000    Preserved left ventricular systolic function. Ejection fraction 50%. Noncritical coronary disease.  (Dr. Tomas).    CHOLECYSTECTOMY  2001    COLONOSCOPY  1988    (Dr. ELIEL Krause) - Normal colon.    COLONOSCOPY  05/12/2016    Polyp distal sigmoid, rectum - Dr Poon    COLONOSCOPY N/A 10/29/2024    hyperplastic x2, tubular x1, benign mucosal polyp x1; Dr sahu at [facility]    CYSTOSCOPY Left 08/05/2019    CYSTO Left Stent Insertion performed by Evan Randall MD at [facility] OR    CYSTOSCOPY Left 05/16/2022    CYSTO Left Ureteroscopy Holmium Laser w/ stent placement performed by Evan Randall MD at [facility] OR    HEMORRHOID SURGERY  2010

## 2025-06-14 ENCOUNTER — HOSPITAL ENCOUNTER (OUTPATIENT)
Age: 65
Discharge: HOME | End: 2025-06-14
Payer: COMMERCIAL

## 2025-06-14 ENCOUNTER — OFFICE VISIT (OUTPATIENT)
Dept: PRIMARY CARE CLINIC | Age: 65
End: 2025-06-14
Payer: COMMERCIAL

## 2025-06-14 ENCOUNTER — RESULTS FOLLOW-UP (OUTPATIENT)
Dept: PRIMARY CARE CLINIC | Age: 65
End: 2025-06-14

## 2025-06-14 VITALS
DIASTOLIC BLOOD PRESSURE: 60 MMHG | SYSTOLIC BLOOD PRESSURE: 116 MMHG | WEIGHT: 131 LBS | BODY MASS INDEX: 22.49 KG/M2 | HEART RATE: 66 BPM | TEMPERATURE: 97.7 F | RESPIRATION RATE: 16 BRPM | OXYGEN SATURATION: 100 %

## 2025-06-14 DIAGNOSIS — N30.00 ACUTE CYSTITIS WITHOUT HEMATURIA: Primary | ICD-10-CM

## 2025-06-14 DIAGNOSIS — R10.9 FLANK PAIN: ICD-10-CM

## 2025-06-14 LAB
BACTERIA URNS QL MICRO: ABNORMAL
BILIRUB UR QL STRIP: NEGATIVE
CHARACTER UR: ABNORMAL
CLARITY UR: CLEAR
COLOR UR: YELLOW
EPI CELLS #/AREA URNS HPF: ABNORMAL /HPF (ref 0–5)
GLUCOSE UR STRIP-MCNC: NEGATIVE MG/DL
HGB UR QL STRIP.AUTO: NEGATIVE
KETONES UR STRIP-MCNC: NEGATIVE MG/DL
LEUKOCYTE ESTERASE UR QL STRIP: ABNORMAL
NITRITE UR QL STRIP: POSITIVE
PH UR STRIP: 5.5 [PH] (ref 5–6)
PROT UR STRIP-MCNC: NEGATIVE MG/DL
RBC #/AREA URNS HPF: ABNORMAL /HPF (ref 0–4)
SP GR UR STRIP: 1.02 (ref 1.01–1.02)
UROBILINOGEN UR STRIP-ACNC: NORMAL EU/DL (ref 0–1)
WBC #/AREA URNS HPF: ABNORMAL /HPF (ref 0–4)

## 2025-06-14 PROCEDURE — G8420 CALC BMI NORM PARAMETERS: HCPCS

## 2025-06-14 PROCEDURE — 3017F COLORECTAL CA SCREEN DOC REV: CPT

## 2025-06-14 PROCEDURE — 1111F DSCHRG MED/CURRENT MED MERGE: CPT

## 2025-06-14 PROCEDURE — 3074F SYST BP LT 130 MM HG: CPT

## 2025-06-14 PROCEDURE — G8427 DOCREV CUR MEDS BY ELIG CLIN: HCPCS

## 2025-06-14 PROCEDURE — 87086 URINE CULTURE/COLONY COUNT: CPT

## 2025-06-14 PROCEDURE — 99213 OFFICE O/P EST LOW 20 MIN: CPT

## 2025-06-14 PROCEDURE — 81001 URINALYSIS AUTO W/SCOPE: CPT

## 2025-06-14 PROCEDURE — 3078F DIAST BP <80 MM HG: CPT

## 2025-06-14 PROCEDURE — 4004F PT TOBACCO SCREEN RCVD TLK: CPT

## 2025-06-14 RX ORDER — CEPHALEXIN 500 MG/1
500 CAPSULE ORAL 2 TIMES DAILY
Qty: 14 CAPSULE | Refills: 0 | Status: SHIPPED | OUTPATIENT
Start: 2025-06-14 | End: 2025-06-21

## 2025-06-14 ASSESSMENT — ENCOUNTER SYMPTOMS
SHORTNESS OF BREATH: 0
ABDOMINAL PAIN: 0
COLOR CHANGE: 0
NAUSEA: 1
VOMITING: 0
DIARRHEA: 0
CONSTIPATION: 0
COUGH: 0

## 2025-06-14 NOTE — PROGRESS NOTES
Urobilinogen, Urine 06/14/2025 Normal  0.0 - 1.0 EU/dL Final    Nitrite, Urine 06/14/2025 POSITIVE (A)  NEGATIVE Final    Leukocyte Esterase, Urine 06/14/2025 1+ (A)  NEGATIVE Final    WBC, UA 06/14/2025 10 TO 20  0 - 4 /HPF Final    RBC, UA 06/14/2025 None  0 - 4 /HPF Final    Epithelial Cells, UA 06/14/2025 5 TO 10  0 - 5 /HPF Final    Bacteria, UA 06/14/2025 MODERATE (A)  None Final    Other Observations UA 06/14/2025 Culture ordered based on defined criteria. (A)  NOT REQ. Final       Discussed exam, POCT findings, plan of care, and follow-up at length with patient.  Reviewed all prescribed and recommended medications, administration and side effects. Encouraged patient to follow up with PCP or return to the clinic for no improvement and or worsening of symptoms. All questions were answered and they verbalized understanding and were agreeable with the plan.     Follow up as needed.    The patient (or guardian, if applicable) and other individuals in attendance with the patient were advised that Artificial Intelligence will be utilized during this visit to record, process the conversation to generate a clinical note, and support improvement of the AI technology. The patient (or guardian, if applicable) and other individuals in attendance at the appointment consented to the use of AI, including the recording.        Electronically signed by AARTI Rashid CNP on 6/14/2025 at 12:04 PM

## 2025-06-14 NOTE — PATIENT INSTRUCTIONS
Urine sent for culture. Will call with results.  Complete full course of antibiotics  May use AZO, ibuprofen or tylenol for pain  Increase water intake  Decrease caffeine and sugary drinks  If symptoms worsen follow up with PCP

## 2025-06-16 LAB
MICROORGANISM SPEC CULT: ABNORMAL
SPECIMEN DESCRIPTION: ABNORMAL

## 2025-06-18 ENCOUNTER — OFFICE VISIT (OUTPATIENT)
Dept: PRIMARY CARE CLINIC | Age: 65
End: 2025-06-18
Payer: COMMERCIAL

## 2025-06-18 ENCOUNTER — HOSPITAL ENCOUNTER (OUTPATIENT)
Age: 65
Discharge: HOME | End: 2025-06-18
Payer: COMMERCIAL

## 2025-06-18 ENCOUNTER — RESULTS FOLLOW-UP (OUTPATIENT)
Dept: PRIMARY CARE CLINIC | Age: 65
End: 2025-06-18

## 2025-06-18 VITALS
OXYGEN SATURATION: 98 % | BODY MASS INDEX: 22.31 KG/M2 | RESPIRATION RATE: 18 BRPM | HEART RATE: 102 BPM | WEIGHT: 130 LBS | TEMPERATURE: 97.5 F | SYSTOLIC BLOOD PRESSURE: 116 MMHG | DIASTOLIC BLOOD PRESSURE: 70 MMHG

## 2025-06-18 DIAGNOSIS — R10.9 FLANK PAIN: ICD-10-CM

## 2025-06-18 DIAGNOSIS — R10.9 ACUTE LEFT FLANK PAIN: Primary | ICD-10-CM

## 2025-06-18 LAB
ALBUMIN SERPL-MCNC: 5.2 G/DL (ref 3.5–5.2)
ALBUMIN/GLOB SERPL: 1.7 {RATIO} (ref 1–2.5)
ALP SERPL-CCNC: 88 U/L (ref 35–104)
ALT SERPL-CCNC: 24 U/L (ref 10–35)
ANION GAP SERPL CALCULATED.3IONS-SCNC: 15 MMOL/L (ref 9–16)
AST SERPL-CCNC: 27 U/L (ref 10–35)
BACTERIA URNS QL MICRO: ABNORMAL
BASOPHILS # BLD: 0.05 K/UL (ref 0–0.2)
BASOPHILS NFR BLD: 0 % (ref 0–2)
BILIRUB SERPL-MCNC: 0.3 MG/DL (ref 0–1.2)
BILIRUB UR QL STRIP: NEGATIVE
BUN SERPL-MCNC: 27 MG/DL (ref 8–23)
BUN/CREAT SERPL: 25 (ref 9–20)
CALCIUM SERPL-MCNC: 10.5 MG/DL (ref 8.6–10.4)
CHARACTER UR: ABNORMAL
CHLORIDE SERPL-SCNC: 93 MMOL/L (ref 98–107)
CLARITY UR: ABNORMAL
CO2 SERPL-SCNC: 26 MMOL/L (ref 20–31)
COLOR UR: YELLOW
CREAT SERPL-MCNC: 1.1 MG/DL (ref 0.6–0.9)
EOSINOPHIL # BLD: 0.15 K/UL (ref 0–0.44)
EOSINOPHILS RELATIVE PERCENT: 1 % (ref 1–4)
EPI CELLS #/AREA URNS HPF: ABNORMAL /HPF (ref 0–5)
ERYTHROCYTE [DISTWIDTH] IN BLOOD BY AUTOMATED COUNT: 12.8 % (ref 11.8–14.4)
GFR, ESTIMATED: 56 ML/MIN/1.73M2
GLUCOSE SERPL-MCNC: 110 MG/DL (ref 74–99)
GLUCOSE UR STRIP-MCNC: NEGATIVE MG/DL
HCT VFR BLD AUTO: 47.1 % (ref 36.3–47.1)
HGB BLD-MCNC: 15.7 G/DL (ref 11.9–15.1)
HGB UR QL STRIP.AUTO: NEGATIVE
IMM GRANULOCYTES # BLD AUTO: 0.07 K/UL (ref 0–0.3)
IMM GRANULOCYTES NFR BLD: 1 %
KETONES UR STRIP-MCNC: NEGATIVE MG/DL
LEUKOCYTE ESTERASE UR QL STRIP: NEGATIVE
LIPASE SERPL-CCNC: 38 U/L (ref 13–60)
LYMPHOCYTES NFR BLD: 2.41 K/UL (ref 1.1–3.7)
LYMPHOCYTES RELATIVE PERCENT: 18 % (ref 24–43)
MCH RBC QN AUTO: 29.3 PG (ref 25.2–33.5)
MCHC RBC AUTO-ENTMCNC: 33.3 G/DL (ref 25.2–33.5)
MCV RBC AUTO: 88 FL (ref 82.6–102.9)
MONOCYTES NFR BLD: 0.81 K/UL (ref 0.1–1.2)
MONOCYTES NFR BLD: 6 % (ref 3–12)
NEUTROPHILS NFR BLD: 75 % (ref 36–65)
NEUTS SEG NFR BLD: 10.2 K/UL (ref 1.5–8.1)
NITRITE UR QL STRIP: NEGATIVE
PH UR STRIP: 5.5 [PH] (ref 5–6)
PLATELET # BLD AUTO: 288 K/UL (ref 138–453)
PMV BLD AUTO: 9.7 FL (ref 8.1–13.5)
POTASSIUM SERPL-SCNC: 3.9 MMOL/L (ref 3.7–5.3)
PROT SERPL-MCNC: 8.3 G/DL (ref 6.6–8.7)
PROT UR STRIP-MCNC: NEGATIVE MG/DL
RBC # BLD AUTO: 5.35 M/UL (ref 3.95–5.11)
RBC #/AREA URNS HPF: ABNORMAL /HPF (ref 0–4)
SODIUM SERPL-SCNC: 134 MMOL/L (ref 136–145)
SP GR UR STRIP: 1.01 (ref 1.01–1.02)
UROBILINOGEN UR STRIP-ACNC: NORMAL EU/DL (ref 0–1)
WBC #/AREA URNS HPF: ABNORMAL /HPF (ref 0–4)
WBC OTHER # BLD: 13.7 K/UL (ref 3.5–11.3)

## 2025-06-18 PROCEDURE — 80053 COMPREHEN METABOLIC PANEL: CPT

## 2025-06-18 PROCEDURE — 4004F PT TOBACCO SCREEN RCVD TLK: CPT

## 2025-06-18 PROCEDURE — 36415 COLL VENOUS BLD VENIPUNCTURE: CPT

## 2025-06-18 PROCEDURE — 3078F DIAST BP <80 MM HG: CPT

## 2025-06-18 PROCEDURE — G8427 DOCREV CUR MEDS BY ELIG CLIN: HCPCS

## 2025-06-18 PROCEDURE — 85025 COMPLETE CBC W/AUTO DIFF WBC: CPT

## 2025-06-18 PROCEDURE — 3017F COLORECTAL CA SCREEN DOC REV: CPT

## 2025-06-18 PROCEDURE — G8420 CALC BMI NORM PARAMETERS: HCPCS

## 2025-06-18 PROCEDURE — 99214 OFFICE O/P EST MOD 30 MIN: CPT

## 2025-06-18 PROCEDURE — 1111F DSCHRG MED/CURRENT MED MERGE: CPT

## 2025-06-18 PROCEDURE — 3074F SYST BP LT 130 MM HG: CPT

## 2025-06-18 PROCEDURE — 83690 ASSAY OF LIPASE: CPT

## 2025-06-18 PROCEDURE — 81001 URINALYSIS AUTO W/SCOPE: CPT

## 2025-06-18 RX ORDER — TRAMADOL HYDROCHLORIDE 50 MG/1
50 TABLET ORAL EVERY 8 HOURS PRN
Qty: 9 TABLET | Refills: 0 | Status: SHIPPED | OUTPATIENT
Start: 2025-06-18 | End: 2025-06-21

## 2025-06-18 RX ORDER — PREDNISONE 20 MG/1
20 TABLET ORAL 2 TIMES DAILY
Qty: 10 TABLET | Refills: 0 | Status: SHIPPED | OUTPATIENT
Start: 2025-06-18 | End: 2025-06-18 | Stop reason: ALTCHOICE

## 2025-06-18 RX ORDER — CIPROFLOXACIN 500 MG/1
500 TABLET, FILM COATED ORAL 2 TIMES DAILY
Qty: 14 TABLET | Refills: 0 | Status: SHIPPED | OUTPATIENT
Start: 2025-06-18 | End: 2025-06-25

## 2025-06-18 ASSESSMENT — ENCOUNTER SYMPTOMS
DIARRHEA: 0
ABDOMINAL PAIN: 0
NAUSEA: 0
COLOR CHANGE: 0
BACK PAIN: 1
VOMITING: 0
SHORTNESS OF BREATH: 0
COUGH: 0
CONSTIPATION: 0

## 2025-06-18 NOTE — PATIENT INSTRUCTIONS
Finish Keflex and start taking Cipro twice a day for 1 week  Tramadol as needed for pain if Tylenol is not helping- do not drive or drink alcohol after taking as it can make you drowsy  Heating pad  Push fluids  ER if any worsening pain, nausea/vomiting, fever or chills

## 2025-06-18 NOTE — PROGRESS NOTES
[facility] Walk In department of [facility]  [address] [facility] [address]  Phone: [phone]  Fax: [phone]      Maggie Kaplan  1960  MRN: [id number]  Date of visit: 6/18/2025    Chief Complaint:     Maggie Kaplan is here for c/o of Flank Pain (Was seen on 06/14 for UTI, now feeling back pain , shoulder pain, pain level is a 8. )      HPI:     Maggie Kaplan is a 64 y.o. female who presents to the [facility] today for her medical conditions/complaints as noted below.    History of Present Illness  The patient is a 64-year-old female who presents due to flank pain. She was seen in the office 4 days ago for a urinary tract infection (UTI) and was treated with Keflex. She has 3 remaining days left of the antibiotic. However, she is now experiencing back pain and shoulder pain, with a pain level of 8 out of 10. Her culture from the visit on 06/14/2025 grew E. coli, which was sensitive to the Keflex she is taking. The urinalysis shows no nitrates or leukocytes, only a few bacteria, and no red blood cells.    She reports that her urinary symptoms have resolved, but she continues to experience back pain, which has now radiated to her shoulder. She does not experience any burning sensation during urination and has not observed any blood in her urine. She recalls a previous episode of kidney stones, which did not present with hematuria. Her current symptoms have not improved with the ongoing antibiotic therapy. The pain is predominantly localized to one side of her back and extends to her shoulder blade. She has been attempting to maintain her work schedule but often finds it challenging to complete her 5-hour shift due to the pain. She also reports excessive sweating but has not recorded any fevers at home. She does not experience chills. The pain is primarily localized, but occasionally radiates when it intensifies. She describes an episode

## 2025-06-23 ENCOUNTER — HOSPITAL ENCOUNTER (OUTPATIENT)
Dept: CT IMAGING | Age: 65
Discharge: HOME | End: 2025-06-25
Payer: COMMERCIAL

## 2025-06-23 ENCOUNTER — HOSPITAL ENCOUNTER (OUTPATIENT)
Age: 65
Discharge: HOME | End: 2025-06-23
Payer: COMMERCIAL

## 2025-06-23 ENCOUNTER — OFFICE VISIT (OUTPATIENT)
Dept: PRIMARY CARE CLINIC | Age: 65
End: 2025-06-23
Payer: COMMERCIAL

## 2025-06-23 ENCOUNTER — RESULTS FOLLOW-UP (OUTPATIENT)
Dept: PRIMARY CARE CLINIC | Age: 65
End: 2025-06-23

## 2025-06-23 VITALS
DIASTOLIC BLOOD PRESSURE: 80 MMHG | OXYGEN SATURATION: 100 % | TEMPERATURE: 97.1 F | SYSTOLIC BLOOD PRESSURE: 122 MMHG | HEART RATE: 101 BPM

## 2025-06-23 DIAGNOSIS — S39.012A LUMBAR STRAIN, INITIAL ENCOUNTER: ICD-10-CM

## 2025-06-23 DIAGNOSIS — M62.838 MUSCLE SPASM: ICD-10-CM

## 2025-06-23 DIAGNOSIS — R10.9 LEFT FLANK PAIN: ICD-10-CM

## 2025-06-23 DIAGNOSIS — R10.9 LEFT FLANK PAIN: Primary | ICD-10-CM

## 2025-06-23 LAB
ALBUMIN SERPL-MCNC: 4.9 G/DL (ref 3.5–5.2)
ALBUMIN/GLOB SERPL: 1.4 {RATIO} (ref 1–2.5)
ALP SERPL-CCNC: 89 U/L (ref 35–104)
ALT SERPL-CCNC: 20 U/L (ref 10–35)
ANION GAP SERPL CALCULATED.3IONS-SCNC: 13 MMOL/L (ref 9–16)
AST SERPL-CCNC: 23 U/L (ref 10–35)
BASOPHILS # BLD: 0.06 K/UL (ref 0–0.2)
BASOPHILS NFR BLD: 1 % (ref 0–2)
BILIRUB SERPL-MCNC: 0.3 MG/DL (ref 0–1.2)
BUN SERPL-MCNC: 27 MG/DL (ref 8–23)
BUN/CREAT SERPL: 23 (ref 9–20)
CALCIUM SERPL-MCNC: 11.2 MG/DL (ref 8.6–10.4)
CHLORIDE SERPL-SCNC: 97 MMOL/L (ref 98–107)
CO2 SERPL-SCNC: 28 MMOL/L (ref 20–31)
CREAT SERPL-MCNC: 1.2 MG/DL (ref 0.6–0.9)
EOSINOPHIL # BLD: 0.2 K/UL (ref 0–0.44)
EOSINOPHILS RELATIVE PERCENT: 2 % (ref 1–4)
ERYTHROCYTE [DISTWIDTH] IN BLOOD BY AUTOMATED COUNT: 13 % (ref 11.8–14.4)
GFR, ESTIMATED: 51 ML/MIN/1.73M2
GLUCOSE SERPL-MCNC: 124 MG/DL (ref 74–99)
HCT VFR BLD AUTO: 48 % (ref 36.3–47.1)
HGB BLD-MCNC: 16.2 G/DL (ref 11.9–15.1)
IMM GRANULOCYTES # BLD AUTO: 0.06 K/UL (ref 0–0.3)
IMM GRANULOCYTES NFR BLD: 1 %
LIPASE SERPL-CCNC: 32 U/L (ref 13–60)
LYMPHOCYTES NFR BLD: 1.8 K/UL (ref 1.1–3.7)
LYMPHOCYTES RELATIVE PERCENT: 15 % (ref 24–43)
MCH RBC QN AUTO: 30.1 PG (ref 25.2–33.5)
MCHC RBC AUTO-ENTMCNC: 33.8 G/DL (ref 25.2–33.5)
MCV RBC AUTO: 89.2 FL (ref 82.6–102.9)
MONOCYTES NFR BLD: 0.66 K/UL (ref 0.1–1.2)
MONOCYTES NFR BLD: 6 % (ref 3–12)
NEUTROPHILS NFR BLD: 75 % (ref 36–65)
NEUTS SEG NFR BLD: 9.16 K/UL (ref 1.5–8.1)
NRBC BLD-RTO: 0 PER 100 WBC
PLATELET # BLD AUTO: 258 K/UL (ref 138–453)
PMV BLD AUTO: 9.6 FL (ref 8.1–13.5)
POTASSIUM SERPL-SCNC: 4.6 MMOL/L (ref 3.7–5.3)
PROT SERPL-MCNC: 8.3 G/DL (ref 6.6–8.7)
RBC # BLD AUTO: 5.38 M/UL (ref 3.95–5.11)
SODIUM SERPL-SCNC: 138 MMOL/L (ref 136–145)
WBC OTHER # BLD: 11.9 K/UL (ref 3.5–11.3)

## 2025-06-23 PROCEDURE — 99213 OFFICE O/P EST LOW 20 MIN: CPT

## 2025-06-23 PROCEDURE — 1111F DSCHRG MED/CURRENT MED MERGE: CPT

## 2025-06-23 PROCEDURE — 3017F COLORECTAL CA SCREEN DOC REV: CPT

## 2025-06-23 PROCEDURE — 85025 COMPLETE CBC W/AUTO DIFF WBC: CPT

## 2025-06-23 PROCEDURE — 3074F SYST BP LT 130 MM HG: CPT

## 2025-06-23 PROCEDURE — 4004F PT TOBACCO SCREEN RCVD TLK: CPT

## 2025-06-23 PROCEDURE — G8420 CALC BMI NORM PARAMETERS: HCPCS

## 2025-06-23 PROCEDURE — 83690 ASSAY OF LIPASE: CPT

## 2025-06-23 PROCEDURE — G8427 DOCREV CUR MEDS BY ELIG CLIN: HCPCS

## 2025-06-23 PROCEDURE — 3079F DIAST BP 80-89 MM HG: CPT

## 2025-06-23 PROCEDURE — 80053 COMPREHEN METABOLIC PANEL: CPT

## 2025-06-23 PROCEDURE — 36415 COLL VENOUS BLD VENIPUNCTURE: CPT

## 2025-06-23 PROCEDURE — 74176 CT ABD & PELVIS W/O CONTRAST: CPT

## 2025-06-23 RX ORDER — PREDNISONE 20 MG/1
20 TABLET ORAL 2 TIMES DAILY
Qty: 10 TABLET | Refills: 0 | Status: SHIPPED | OUTPATIENT
Start: 2025-06-23 | End: 2025-06-28

## 2025-06-23 ASSESSMENT — ENCOUNTER SYMPTOMS
ABDOMINAL PAIN: 0
CONSTIPATION: 0
COUGH: 0
NAUSEA: 1
VOMITING: 0
COLOR CHANGE: 0
DIARRHEA: 0
SHORTNESS OF BREATH: 0

## 2025-06-23 NOTE — PATIENT INSTRUCTIONS
Take steroid with food in AM- avoid taking with Ibuprofen  Tylenol as needed for pain  Keep pushing fluids  Any worsening pain-ER  Will call with UA results  Follow up as needed

## 2025-06-23 NOTE — TELEPHONE ENCOUNTER
Medication was last dispensed 4/2/25.      Maggie called requesting a refill of the below medication which has been pended for you:     Requested Prescriptions     Pending Prescriptions Disp Refills    cyclobenzaprine (FLEXERIL) 10 MG tablet [Pharmacy Med Name: CYCLOBENZAPRINE 10 MG TABLET] 60 tablet      Sig: TAKE 1 TABLET BY MOUTH 2 TIMES A DAY AS NEEDED FOR MUSCLE SPASMS       Last Appointment Date: 12/20/2024  Next Appointment Date: 8/21/2025    No Known Allergies

## 2025-06-23 NOTE — PROGRESS NOTES
[facility] Walk In department of [facility]  [address] [facility] [address]  Phone: [phone]  Fax: [phone]      Maggie Kaplan  1960  MRN: [id number]  Date of visit: 6/23/2025    Chief Complaint:     Maggie Kaplan is here for c/o of Abdominal Pain      HPI:     Maggie Kaplan is a 64 y.o. female who presents to the [facility] Walk-In Care today for her medical conditions/complaints as noted below.    History of Present Illness  The patient is a 64-year-old female who presents today due to abdominal pain. She has been seen twice in the walk-in clinic. She was treated for a UTI with Keflex and then also started a course of ciprofloxacin for infection as well. White cell was elevated on the 18th to 13.7 when the cipro was initiated. She declined a CT at that time but presents today with continued left flank pain.    She reports persistent left flank pain, which she rates as a 7 on the pain scale. The pain is localized to one spot and is similar to her previous experience with kidney stones, although less severe. She has not experienced any relief from the pain and feels fatigued. She was unable to return to work on Saturday due to her exhaustion. She has been managing the pain with tramadol, which provides temporary relief. She has been consuming large amounts of water and her bowel movements are regular. She has a history of kidney stones, which were previously treated with lithotripsy.    She has been seen twice in the walk-in clinic and was treated for a urinary tract infection (UTI) with Keflex, which she will complete tomorrow. Additionally, she was started on a course of ciprofloxacin for an infection, which she has completed. Her white blood cell count was elevated to 13.7 on 06/18/2025. She declined a CT scan at that time but presents today with ongoing abdominal pain. She reports no issues with urination or burning

## 2025-06-24 RX ORDER — CYCLOBENZAPRINE HCL 10 MG
TABLET ORAL
Qty: 60 TABLET | Refills: 0 | Status: SHIPPED | OUTPATIENT
Start: 2025-06-24

## 2025-06-30 ENCOUNTER — RESULTS FOLLOW-UP (OUTPATIENT)
Dept: PRIMARY CARE CLINIC | Age: 65
End: 2025-06-30

## 2025-06-30 ENCOUNTER — OFFICE VISIT (OUTPATIENT)
Dept: PRIMARY CARE CLINIC | Age: 65
End: 2025-06-30
Payer: COMMERCIAL

## 2025-06-30 ENCOUNTER — HOSPITAL ENCOUNTER (OUTPATIENT)
Dept: GENERAL RADIOLOGY | Age: 65
Discharge: HOME | End: 2025-07-02
Payer: COMMERCIAL

## 2025-06-30 VITALS
SYSTOLIC BLOOD PRESSURE: 110 MMHG | HEIGHT: 64 IN | WEIGHT: 127 LBS | DIASTOLIC BLOOD PRESSURE: 70 MMHG | OXYGEN SATURATION: 95 % | RESPIRATION RATE: 18 BRPM | TEMPERATURE: 98.9 F | HEART RATE: 90 BPM | BODY MASS INDEX: 21.68 KG/M2

## 2025-06-30 DIAGNOSIS — G89.29 CHRONIC BILATERAL LOW BACK PAIN WITHOUT SCIATICA: ICD-10-CM

## 2025-06-30 DIAGNOSIS — M54.50 CHRONIC BILATERAL LOW BACK PAIN WITHOUT SCIATICA: ICD-10-CM

## 2025-06-30 DIAGNOSIS — G89.29 CHRONIC BILATERAL LOW BACK PAIN WITHOUT SCIATICA: Primary | ICD-10-CM

## 2025-06-30 DIAGNOSIS — M54.50 CHRONIC BILATERAL LOW BACK PAIN WITHOUT SCIATICA: Primary | ICD-10-CM

## 2025-06-30 PROCEDURE — 3078F DIAST BP <80 MM HG: CPT | Performed by: PHYSICIAN ASSISTANT

## 2025-06-30 PROCEDURE — G8420 CALC BMI NORM PARAMETERS: HCPCS | Performed by: PHYSICIAN ASSISTANT

## 2025-06-30 PROCEDURE — 3074F SYST BP LT 130 MM HG: CPT | Performed by: PHYSICIAN ASSISTANT

## 2025-06-30 PROCEDURE — 72100 X-RAY EXAM L-S SPINE 2/3 VWS: CPT

## 2025-06-30 PROCEDURE — 3017F COLORECTAL CA SCREEN DOC REV: CPT | Performed by: PHYSICIAN ASSISTANT

## 2025-06-30 PROCEDURE — 99213 OFFICE O/P EST LOW 20 MIN: CPT | Performed by: PHYSICIAN ASSISTANT

## 2025-06-30 PROCEDURE — 4004F PT TOBACCO SCREEN RCVD TLK: CPT | Performed by: PHYSICIAN ASSISTANT

## 2025-06-30 PROCEDURE — G8427 DOCREV CUR MEDS BY ELIG CLIN: HCPCS | Performed by: PHYSICIAN ASSISTANT

## 2025-06-30 PROCEDURE — 1111F DSCHRG MED/CURRENT MED MERGE: CPT | Performed by: PHYSICIAN ASSISTANT

## 2025-06-30 ASSESSMENT — ENCOUNTER SYMPTOMS
BOWEL INCONTINENCE: 0
SHORTNESS OF BREATH: 0
BACK PAIN: 1

## 2025-06-30 NOTE — PROGRESS NOTES
[facility]  [address]  [facility] [address]  Dept: [phone]  Dept Fax: [phone]    Maggie Kaplan is a 64 y.o. female who presents today for her medical conditions/complaints as noted below. Maggie Kaplan is c/o of   Chief Complaint   Patient presents with    Back Pain     Mid to lower back pain radiating to both legs. Has numbness to right great toe. Has been treated for \"kidney infection\" 6/23/25.    .    HPI:     Patient is a 65 yo female presenting today due to mid to low back pain which radiates down both legs. She was at work today when she began sweating and developed weakness and pain. She also felt like she was going to faint. She is also experiencing numbness in her toes. She has had this before in the past.     Back Pain  This is a chronic problem. The current episode started today. The problem is unchanged. The pain is present in the lumbar spine and thoracic spine. Quality: sharp. The pain radiates to the left knee, right thigh, left thigh, right knee, right foot and left foot. The pain is moderate. Exacerbated by: walking. Associated symptoms include leg pain, numbness and weakness. Pertinent negatives include no bladder incontinence, bowel incontinence, chest pain or pelvic pain. Paresis: toes.She has tried NSAIDs for the symptoms. The treatment provided mild relief.         Past Medical History:   Diagnosis Date    Abnormal Pap smear of cervix 1970    before her hysterectomy in 1972    CAD (coronary artery disease)     Chronic kidney disease     COPD (chronic obstructive pulmonary disease) (HCC)     Hemorrhoids     History of herpes zoster 2002    (right lower rib area laterally and anteriorly).    History of thyroiditis 1992    History of ulcerative colitis     (of 20 years disease - though quiescent).    Hypercholesterolemia     Hypertension

## 2025-06-30 NOTE — PATIENT INSTRUCTIONS
Restrict activities  Avoid heavy lifting  Perform light stretches  Practice core strengthening activities- abdominal exercises  May use tylenol and ibuprofen for pain  May use heat and ice alternating 20 mins at a time x 4- 5 time daily  OTC creams can be used biofreeze, aspercream, valtoren (avoid using while using ice and heat can intensify )  If symptoms worsen follow up with PCP   Patient verbalized understanding and agrees with plan of care

## 2025-07-07 ENCOUNTER — OFFICE VISIT (OUTPATIENT)
Dept: FAMILY MEDICINE CLINIC | Age: 65
End: 2025-07-07
Payer: COMMERCIAL

## 2025-07-07 VITALS
SYSTOLIC BLOOD PRESSURE: 150 MMHG | DIASTOLIC BLOOD PRESSURE: 82 MMHG | HEIGHT: 64 IN | OXYGEN SATURATION: 95 % | HEART RATE: 71 BPM | BODY MASS INDEX: 22.47 KG/M2 | WEIGHT: 131.6 LBS

## 2025-07-07 DIAGNOSIS — I10 PRIMARY HYPERTENSION: Primary | ICD-10-CM

## 2025-07-07 PROCEDURE — G8420 CALC BMI NORM PARAMETERS: HCPCS | Performed by: FAMILY MEDICINE

## 2025-07-07 PROCEDURE — 3077F SYST BP >= 140 MM HG: CPT | Performed by: FAMILY MEDICINE

## 2025-07-07 PROCEDURE — 3017F COLORECTAL CA SCREEN DOC REV: CPT | Performed by: FAMILY MEDICINE

## 2025-07-07 PROCEDURE — 3079F DIAST BP 80-89 MM HG: CPT | Performed by: FAMILY MEDICINE

## 2025-07-07 PROCEDURE — 4004F PT TOBACCO SCREEN RCVD TLK: CPT | Performed by: FAMILY MEDICINE

## 2025-07-07 PROCEDURE — G8427 DOCREV CUR MEDS BY ELIG CLIN: HCPCS | Performed by: FAMILY MEDICINE

## 2025-07-07 PROCEDURE — 99213 OFFICE O/P EST LOW 20 MIN: CPT | Performed by: FAMILY MEDICINE

## 2025-07-07 RX ORDER — LISINOPRIL 10 MG/1
10 TABLET ORAL DAILY
Qty: 30 TABLET | Refills: 5 | Status: SHIPPED | OUTPATIENT
Start: 2025-07-07

## 2025-07-07 ASSESSMENT — ENCOUNTER SYMPTOMS
GASTROINTESTINAL NEGATIVE: 1
RESPIRATORY NEGATIVE: 1
EYES NEGATIVE: 1

## 2025-07-07 NOTE — PROGRESS NOTES
Subjective:      Patient ID: Maggie Kaplan is a 64 y.o. female.    HPI  acute visit for elevated bp.  Started lisinopril-hctz product late may.  She has had issues with high bp initially, also with uti issues reported.  Missed a month of work by report.  She reports dizziness, weakness, and easily fatigued after starting this medication.  She eventually decided to stop the drug, and doing better.  Back to work.  Going ok.  Fatigue and dizziness improved.  She was on lisinopril alone in the past.    She describes the kidney infection causing some issues with bp elevations from her perspective.      Past Medical History:   Diagnosis Date    Abnormal Pap smear of cervix 1970    before her hysterectomy in 1972    CAD (coronary artery disease)     Chronic kidney disease     COPD (chronic obstructive pulmonary disease) (HCC)     Hemorrhoids     History of herpes zoster 2002    (right lower rib area laterally and anteriorly).    History of thyroiditis 1992    History of ulcerative colitis     (of 20 years disease - though quiescent).    Hypercholesterolemia     Hypertension     Neuritis     Shortened NM interval     (on EKG from 2004).    Tobacco abuse     Venous insufficiency, peripheral     (bilateral).     Past Surgical History:   Procedure Laterality Date    APPENDECTOMY  1982    (Dr. UZMA Krause) - Acute appendicitis.    CARDIAC CATHETERIZATION  2000    Preserved left ventricular systolic function. Ejection fraction 50%. Noncritical coronary disease.  (Dr. Tomas).    CHOLECYSTECTOMY  2001    COLONOSCOPY  1988    (Dr. ELIEL Krause) - Normal colon.    COLONOSCOPY  05/12/2016    Polyp distal sigmoid, rectum - Dr Poon    COLONOSCOPY N/A 10/29/2024    hyperplastic x2, tubular x1, benign mucosal polyp x1; Dr sahu at [facility]    CYSTOSCOPY Left 08/05/2019    CYSTO Left Stent Insertion performed by Evan Randall MD at [facility] OR    CYSTOSCOPY Left 05/16/2022    CYSTO Left Ureteroscopy Holmium Laser w/ stent placement performed by Evan

## 2025-07-28 ENCOUNTER — HOSPITAL ENCOUNTER (OUTPATIENT)
Dept: NUCLEAR MEDICINE | Age: 65
Discharge: HOME | End: 2025-07-30
Payer: COMMERCIAL

## 2025-07-28 ENCOUNTER — TELEPHONE (OUTPATIENT)
Dept: CARDIOLOGY | Age: 65
End: 2025-07-28

## 2025-07-28 ENCOUNTER — HOSPITAL ENCOUNTER (OUTPATIENT)
Age: 65
Discharge: HOME | End: 2025-07-30
Payer: COMMERCIAL

## 2025-07-28 DIAGNOSIS — R06.02 SHORTNESS OF BREATH: ICD-10-CM

## 2025-07-28 DIAGNOSIS — R94.31 ABNORMAL ELECTROCARDIOGRAPHY: ICD-10-CM

## 2025-07-28 LAB
NUC STRESS EJECTION FRACTION: 66 %
STRESS BASELINE DIAS BP: 96 MMHG
STRESS BASELINE HR: 58 BPM
STRESS BASELINE SYS BP: 193 MMHG
STRESS ESTIMATED WORKLOAD: 1 METS
STRESS PEAK DIAS BP: 96 MMHG
STRESS PEAK SYS BP: 193 MMHG
STRESS PERCENT HR ACHIEVED: 64 %
STRESS POST PEAK HR: 101 BPM
STRESS RATE PRESSURE PRODUCT: NORMAL BPM*MMHG
STRESS TARGET HR: 157 BPM
TID: 1.11

## 2025-07-28 PROCEDURE — 93017 CV STRESS TEST TRACING ONLY: CPT

## 2025-07-28 PROCEDURE — 93016 CV STRESS TEST SUPVJ ONLY: CPT | Performed by: INTERNAL MEDICINE

## 2025-07-28 PROCEDURE — 78452 HT MUSCLE IMAGE SPECT MULT: CPT | Performed by: INTERNAL MEDICINE

## 2025-07-28 PROCEDURE — A9500 TC99M SESTAMIBI: HCPCS | Performed by: SURGERY

## 2025-07-28 PROCEDURE — 6360000002 HC RX W HCPCS: Performed by: INTERNAL MEDICINE

## 2025-07-28 PROCEDURE — 3430000000 HC RX DIAGNOSTIC RADIOPHARMACEUTICAL: Performed by: SURGERY

## 2025-07-28 PROCEDURE — 78452 HT MUSCLE IMAGE SPECT MULT: CPT

## 2025-07-28 PROCEDURE — 93018 CV STRESS TEST I&R ONLY: CPT | Performed by: INTERNAL MEDICINE

## 2025-07-28 RX ORDER — REGADENOSON 0.08 MG/ML
0.4 INJECTION, SOLUTION INTRAVENOUS ONCE
Status: COMPLETED | OUTPATIENT
Start: 2025-07-28 | End: 2025-07-28

## 2025-07-28 RX ORDER — TETRAKIS(2-METHOXYISOBUTYLISOCYANIDE)COPPER(I) TETRAFLUOROBORATE 1 MG/ML
30 INJECTION, POWDER, LYOPHILIZED, FOR SOLUTION INTRAVENOUS
Status: COMPLETED | OUTPATIENT
Start: 2025-07-28 | End: 2025-07-28

## 2025-07-28 RX ORDER — TETRAKIS(2-METHOXYISOBUTYLISOCYANIDE)COPPER(I) TETRAFLUOROBORATE 1 MG/ML
10 INJECTION, POWDER, LYOPHILIZED, FOR SOLUTION INTRAVENOUS
Status: COMPLETED | OUTPATIENT
Start: 2025-07-28 | End: 2025-07-28

## 2025-07-28 RX ADMIN — Medication 10 MILLICURIE: at 09:45

## 2025-07-28 RX ADMIN — Medication 30 MILLICURIE: at 10:57

## 2025-07-28 RX ADMIN — REGADENOSON 0.4 MG: 0.08 INJECTION, SOLUTION INTRAVENOUS at 10:58

## 2025-07-28 NOTE — TELEPHONE ENCOUNTER
Interpretation Summary  Show Result Comparison     Stress Combined Conclusion: The study is negative for myocardial ischemia and infarction. Findings suggest a low risk of cardiac events.    Stress Function: Left ventricular function post-stress is normal. Post-stress ejection fraction is 66%.    Perfusion Comments: Prone images were obtained. LV perfusion is normal. There is no evidence of inducible ischemia.    Perfusion Conclusion: TID ratio is 1.11.    ECG: The stress ECG was negative for ischemia.    ECG: Resting ECG demonstrates normal sinus rhythm.    Stress Test: A pharmacological stress test was performed using regadenoson (Lexiscan). The patient reported flushing, headaches and nausea during the stress test. Symptoms began during stress and ended during recovery. The patient reached the end of the protocol.    Stress ECG: There were no arrhythmias during stress. No significant ST changes noted. The stress ECG was negative for ischemia.    Resting ECG: The ECG shows sinus rhythm.

## 2025-07-29 NOTE — TELEPHONE ENCOUNTER
Patient was notified of cardiac test results per cardiologist and advised to continue current medications and keep all upcoming appointments. Patient also was advised to call office with any new or worsening symptoms or concerns. Patient verbalized understanding and had no further questions at the time.

## 2025-09-02 ENCOUNTER — HOSPITAL ENCOUNTER (OUTPATIENT)
Dept: LAB | Age: 65
Discharge: HOME | End: 2025-09-02
Payer: COMMERCIAL

## 2025-09-02 DIAGNOSIS — R35.0 URINARY FREQUENCY: ICD-10-CM

## 2025-09-02 LAB
BACTERIA URNS QL MICRO: NORMAL
BILIRUB UR QL STRIP: NEGATIVE
CLARITY UR: CLEAR
COLOR UR: YELLOW
COMMENT: NORMAL
EPI CELLS #/AREA URNS HPF: NORMAL /HPF (ref 0–5)
GLUCOSE UR STRIP-MCNC: NEGATIVE MG/DL
HGB UR QL STRIP.AUTO: NEGATIVE
KETONES UR STRIP-MCNC: NEGATIVE MG/DL
LEUKOCYTE ESTERASE UR QL STRIP: NEGATIVE
NITRITE UR QL STRIP: NEGATIVE
PH UR STRIP: 6 [PH] (ref 5–6)
PROT UR STRIP-MCNC: NEGATIVE MG/DL
RBC #/AREA URNS HPF: NORMAL /HPF (ref 0–4)
SP GR UR STRIP: 1.02 (ref 1.01–1.02)
UROBILINOGEN UR STRIP-ACNC: NORMAL EU/DL (ref 0–1)
WBC #/AREA URNS HPF: NORMAL /HPF (ref 0–4)

## 2025-09-02 PROCEDURE — 81015 MICROSCOPIC EXAM OF URINE: CPT

## 2025-09-03 ENCOUNTER — HOSPITAL ENCOUNTER (OUTPATIENT)
Age: 65
Discharge: HOME | End: 2025-09-05
Payer: COMMERCIAL

## 2025-09-03 DIAGNOSIS — R55 SYNCOPE, UNSPECIFIED SYNCOPE TYPE: ICD-10-CM

## 2025-09-03 PROCEDURE — 93225 XTRNL ECG REC<48 HRS REC: CPT

## (undated) DEVICE — MERCY DEFIANCE ENDO KIT: Brand: MEDLINE INDUSTRIES, INC.

## (undated) DEVICE — GARMENT,MEDLINE,DVT,INT,CALF,MED, GEN2: Brand: MEDLINE

## (undated) DEVICE — SOLUTION IV IRRIG POUR BRL 0.9% SODIUM CHL 2F7124

## (undated) DEVICE — STRIP,CLOSURE,WOUND,MEDI-STRIP,1/2X4: Brand: MEDLINE

## (undated) DEVICE — ELECTRODE PT RET AD L9FT HI MOIST COND ADH HYDRGEL CORDED

## (undated) DEVICE — Z INACTIVE USE 2660664 SOLUTION IRRIG 3000ML 0.9% SOD CHL USP UROMATIC PLAS CONT

## (undated) DEVICE — GUIDEWIRE URO L150CM DIA0.035IN STIFF NIT HYDRPHLC STR TIP

## (undated) DEVICE — Z DUP USE 2565107 PACK SURG PROC LEG CYSTO T-DRAPE REINF TBL CVR HND TWL

## (undated) DEVICE — FORCEPS BX L240CM JAW DIA2.2MM RAD JAW 4 HOT DISP

## (undated) DEVICE — POUCH DRNGE FLX BND INTEGR RAIL CLMP DISP EZ CTCH

## (undated) DEVICE — JELLY LUBRICATING 4OZ FLIP TOP TB E Z

## (undated) DEVICE — SINGLE ACTION PUMPING SYSTEM

## (undated) DEVICE — SNARE ENDOSCP M W27MMXL240CM SHTH DIA2.4MM OVL FLX DISP

## (undated) DEVICE — TRAP SURG QUAD PARABOLA SLOT DSGN SFTY SCRN TRAPEASE

## (undated) DEVICE — GLOVE SURG SZ 6 THK91MIL LTX FREE SYN POLYISOPRENE ANTI

## (undated) DEVICE — GOWN,AURORA,NONREINFORCED,LARGE: Brand: MEDLINE

## (undated) DEVICE — GLOVE ORANGE PI 7   MSG9070

## (undated) DEVICE — SOLUTION SCRB 4OZ 4% CHG CLN BASE FOR PT SKIN ANTISEPSIS

## (undated) DEVICE — SOLUTION IRRIG 3000ML 0.9% SOD CHL USP UROMATIC PLAS CONT

## (undated) DEVICE — SOLUTION IV IRRIG WATER 1000ML POUR BRL 2F7114

## (undated) DEVICE — GLOVE ORANGE PI 7 1/2   MSG9075

## (undated) DEVICE — GOWN,AURORA,NONRNF,XL,30/CS: Brand: MEDLINE

## (undated) DEVICE — CO2 CANNULA,SSOFT,ADLT,7O2,4CO2,FEMALE: Brand: MEDLINE

## (undated) DEVICE — ADHESIVE LIQ 2OZ ADJUNCT FOR DSG MASTISOL

## (undated) DEVICE — 4-PORT MANIFOLD: Brand: NEPTUNE 2

## (undated) DEVICE — BAG DRN UROLOGY ANTI REFLX 200ML

## (undated) DEVICE — CYSTO/BLADDER IRRIGATION SET, REGULATING CLAMP

## (undated) DEVICE — SHEATH URO 11/13FR L28CM URETRAL NAVIGATOR ACC

## (undated) DEVICE — DALE FOLEY CATHETER HOLDER, LEGBAND, FITS UP TO 30": Brand: DALE FOLEY CATHETER HOLDER

## (undated) DEVICE — MASTISOL ADHESIVE LIQ 2/3ML

## (undated) DEVICE — 35 ML SYRINGE LUER-LOCK TIP: Brand: MONOJECT